# Patient Record
Sex: FEMALE | Race: WHITE | Employment: OTHER | URBAN - METROPOLITAN AREA
[De-identification: names, ages, dates, MRNs, and addresses within clinical notes are randomized per-mention and may not be internally consistent; named-entity substitution may affect disease eponyms.]

---

## 2017-12-28 PROBLEM — N17.9 ACUTE KIDNEY INJURY (HCC): Status: ACTIVE | Noted: 2017-12-28

## 2017-12-28 PROBLEM — N17.9 ACUTE RENAL FAILURE (ARF) (HCC): Status: ACTIVE | Noted: 2017-12-28

## 2018-03-30 ENCOUNTER — APPOINTMENT (OUTPATIENT)
Dept: GENERAL RADIOLOGY | Age: 78
End: 2018-03-30
Payer: MEDICARE

## 2018-03-30 ENCOUNTER — HOSPITAL ENCOUNTER (EMERGENCY)
Age: 78
Discharge: OTHER INSTITUTION | End: 2018-03-30
Attending: EMERGENCY MEDICINE
Payer: MEDICARE

## 2018-03-30 VITALS
DIASTOLIC BLOOD PRESSURE: 56 MMHG | OXYGEN SATURATION: 94 % | TEMPERATURE: 98 F | HEART RATE: 81 BPM | BODY MASS INDEX: 32.94 KG/M2 | WEIGHT: 179 LBS | RESPIRATION RATE: 18 BRPM | HEIGHT: 62 IN | SYSTOLIC BLOOD PRESSURE: 101 MMHG

## 2018-03-30 DIAGNOSIS — E87.70 HYPERVOLEMIA, UNSPECIFIED HYPERVOLEMIA TYPE: ICD-10-CM

## 2018-03-30 DIAGNOSIS — E87.5 HYPERKALEMIA: Primary | ICD-10-CM

## 2018-03-30 DIAGNOSIS — I50.9 CONGESTIVE HEART FAILURE, UNSPECIFIED CONGESTIVE HEART FAILURE CHRONICITY, UNSPECIFIED CONGESTIVE HEART FAILURE TYPE: ICD-10-CM

## 2018-03-30 DIAGNOSIS — N17.9 AKI (ACUTE KIDNEY INJURY) (HCC): ICD-10-CM

## 2018-03-30 DIAGNOSIS — D62 ACUTE BLOOD LOSS ANEMIA: ICD-10-CM

## 2018-03-30 LAB
ABO/RH: NORMAL
ALBUMIN SERPL-MCNC: 2.9 G/DL (ref 3.5–5.2)
ALP BLD-CCNC: 96 U/L (ref 35–104)
ALT SERPL-CCNC: 35 U/L (ref 0–32)
ANION GAP SERPL CALCULATED.3IONS-SCNC: 11 MMOL/L (ref 7–16)
ANTIBODY SCREEN: NORMAL
APTT: 23.5 SEC (ref 24.5–35.1)
AST SERPL-CCNC: 34 U/L (ref 0–31)
BASOPHILS ABSOLUTE: 0.07 E9/L (ref 0–0.2)
BASOPHILS RELATIVE PERCENT: 0.6 % (ref 0–2)
BILIRUB SERPL-MCNC: 0.4 MG/DL (ref 0–1.2)
BLOOD BANK DISPENSE STATUS: NORMAL
BLOOD BANK PRODUCT CODE: NORMAL
BPU ID: NORMAL
BUN BLDV-MCNC: 61 MG/DL (ref 8–23)
CALCIUM SERPL-MCNC: 8.7 MG/DL (ref 8.6–10.2)
CHLORIDE BLD-SCNC: 91 MMOL/L (ref 98–107)
CO2: 30 MMOL/L (ref 22–29)
CREAT SERPL-MCNC: 1.6 MG/DL (ref 0.5–1)
DESCRIPTION BLOOD BANK: NORMAL
EKG ATRIAL RATE: 79 BPM
EKG P AXIS: 29 DEGREES
EKG P-R INTERVAL: 180 MS
EKG Q-T INTERVAL: 332 MS
EKG QRS DURATION: 84 MS
EKG QTC CALCULATION (BAZETT): 380 MS
EKG R AXIS: 5 DEGREES
EKG T AXIS: 142 DEGREES
EKG VENTRICULAR RATE: 79 BPM
EOSINOPHILS ABSOLUTE: 1.06 E9/L (ref 0.05–0.5)
EOSINOPHILS RELATIVE PERCENT: 8.5 % (ref 0–6)
GFR AFRICAN AMERICAN: 38
GFR NON-AFRICAN AMERICAN: 31 ML/MIN/1.73
GLUCOSE BLD-MCNC: 128 MG/DL (ref 74–109)
HCT VFR BLD CALC: 24.5 % (ref 34–48)
HEMOGLOBIN: 7.6 G/DL (ref 11.5–15.5)
IMMATURE GRANULOCYTES #: 0.08 E9/L
IMMATURE GRANULOCYTES %: 0.6 % (ref 0–5)
INR BLD: 1.1
LYMPHOCYTES ABSOLUTE: 1.51 E9/L (ref 1.5–4)
LYMPHOCYTES RELATIVE PERCENT: 12.1 % (ref 20–42)
MAGNESIUM: 2.7 MG/DL (ref 1.6–2.6)
MCH RBC QN AUTO: 27 PG (ref 26–35)
MCHC RBC AUTO-ENTMCNC: 31 % (ref 32–34.5)
MCV RBC AUTO: 87.2 FL (ref 80–99.9)
MONOCYTES ABSOLUTE: 0.99 E9/L (ref 0.1–0.95)
MONOCYTES RELATIVE PERCENT: 7.9 % (ref 2–12)
NEUTROPHILS ABSOLUTE: 8.82 E9/L (ref 1.8–7.3)
NEUTROPHILS RELATIVE PERCENT: 70.3 % (ref 43–80)
PDW BLD-RTO: 16.4 FL (ref 11.5–15)
PLATELET # BLD: 291 E9/L (ref 130–450)
PMV BLD AUTO: 10.1 FL (ref 7–12)
POTASSIUM SERPL-SCNC: 5.8 MMOL/L (ref 3.5–5)
PRO-BNP: 1002 PG/ML (ref 0–450)
PROTHROMBIN TIME: 12 SEC (ref 9.3–12.4)
RBC # BLD: 2.81 E12/L (ref 3.5–5.5)
SODIUM BLD-SCNC: 132 MMOL/L (ref 132–146)
TOTAL PROTEIN: 5.6 G/DL (ref 6.4–8.3)
TROPONIN: 0.03 NG/ML (ref 0–0.03)
WBC # BLD: 12.5 E9/L (ref 4.5–11.5)

## 2018-03-30 PROCEDURE — 96375 TX/PRO/DX INJ NEW DRUG ADDON: CPT

## 2018-03-30 PROCEDURE — 83880 ASSAY OF NATRIURETIC PEPTIDE: CPT

## 2018-03-30 PROCEDURE — 36415 COLL VENOUS BLD VENIPUNCTURE: CPT

## 2018-03-30 PROCEDURE — 6370000000 HC RX 637 (ALT 250 FOR IP): Performed by: EMERGENCY MEDICINE

## 2018-03-30 PROCEDURE — 85610 PROTHROMBIN TIME: CPT

## 2018-03-30 PROCEDURE — 83735 ASSAY OF MAGNESIUM: CPT

## 2018-03-30 PROCEDURE — 99285 EMERGENCY DEPT VISIT HI MDM: CPT

## 2018-03-30 PROCEDURE — 80053 COMPREHEN METABOLIC PANEL: CPT

## 2018-03-30 PROCEDURE — 86920 COMPATIBILITY TEST SPIN: CPT

## 2018-03-30 PROCEDURE — 85025 COMPLETE CBC W/AUTO DIFF WBC: CPT

## 2018-03-30 PROCEDURE — 36430 TRANSFUSION BLD/BLD COMPNT: CPT

## 2018-03-30 PROCEDURE — 71045 X-RAY EXAM CHEST 1 VIEW: CPT

## 2018-03-30 PROCEDURE — 84484 ASSAY OF TROPONIN QUANT: CPT

## 2018-03-30 PROCEDURE — 86900 BLOOD TYPING SEROLOGIC ABO: CPT

## 2018-03-30 PROCEDURE — 96374 THER/PROPH/DIAG INJ IV PUSH: CPT

## 2018-03-30 PROCEDURE — 86901 BLOOD TYPING SEROLOGIC RH(D): CPT

## 2018-03-30 PROCEDURE — 93005 ELECTROCARDIOGRAM TRACING: CPT | Performed by: EMERGENCY MEDICINE

## 2018-03-30 PROCEDURE — 85730 THROMBOPLASTIN TIME PARTIAL: CPT

## 2018-03-30 PROCEDURE — 6370000000 HC RX 637 (ALT 250 FOR IP)

## 2018-03-30 PROCEDURE — P9016 RBC LEUKOCYTES REDUCED: HCPCS

## 2018-03-30 PROCEDURE — 6360000002 HC RX W HCPCS: Performed by: EMERGENCY MEDICINE

## 2018-03-30 PROCEDURE — 2580000003 HC RX 258: Performed by: EMERGENCY MEDICINE

## 2018-03-30 PROCEDURE — 86850 RBC ANTIBODY SCREEN: CPT

## 2018-03-30 RX ORDER — POTASSIUM CHLORIDE 20 MEQ/1
20 TABLET, EXTENDED RELEASE ORAL DAILY
Status: ON HOLD | COMMUNITY
End: 2020-02-18

## 2018-03-30 RX ORDER — NICOTINE POLACRILEX 4 MG
15 LOZENGE BUCCAL PRN
Status: DISCONTINUED | OUTPATIENT
Start: 2018-03-30 | End: 2018-03-31 | Stop reason: HOSPADM

## 2018-03-30 RX ORDER — ACETAMINOPHEN 500 MG
TABLET ORAL
Status: COMPLETED
Start: 2018-03-30 | End: 2018-03-30

## 2018-03-30 RX ORDER — 0.9 % SODIUM CHLORIDE 0.9 %
250 INTRAVENOUS SOLUTION INTRAVENOUS ONCE
Status: DISCONTINUED | OUTPATIENT
Start: 2018-03-30 | End: 2018-03-31 | Stop reason: HOSPADM

## 2018-03-30 RX ORDER — ACETAMINOPHEN 325 MG/1
650 TABLET ORAL EVERY 4 HOURS PRN
COMMUNITY

## 2018-03-30 RX ORDER — ACETAMINOPHEN 500 MG
500 TABLET ORAL ONCE
Status: COMPLETED | OUTPATIENT
Start: 2018-03-30 | End: 2018-03-30

## 2018-03-30 RX ORDER — CARVEDILOL 6.25 MG/1
6.25 TABLET ORAL NIGHTLY
COMMUNITY

## 2018-03-30 RX ORDER — ONDANSETRON 4 MG/1
4 TABLET, FILM COATED ORAL EVERY 8 HOURS PRN
Status: ON HOLD | COMMUNITY
End: 2020-02-18

## 2018-03-30 RX ORDER — FUROSEMIDE 10 MG/ML
40 INJECTION INTRAMUSCULAR; INTRAVENOUS ONCE
Status: COMPLETED | OUTPATIENT
Start: 2018-03-30 | End: 2018-03-30

## 2018-03-30 RX ORDER — DEXTROSE MONOHYDRATE 25 G/50ML
12.5 INJECTION, SOLUTION INTRAVENOUS PRN
Status: DISCONTINUED | OUTPATIENT
Start: 2018-03-30 | End: 2018-03-31 | Stop reason: HOSPADM

## 2018-03-30 RX ORDER — DEXTROSE MONOHYDRATE 50 MG/ML
100 INJECTION, SOLUTION INTRAVENOUS PRN
Status: DISCONTINUED | OUTPATIENT
Start: 2018-03-30 | End: 2018-03-31 | Stop reason: HOSPADM

## 2018-03-30 RX ORDER — LISINOPRIL 40 MG/1
20 TABLET ORAL DAILY
Status: ON HOLD | COMMUNITY
End: 2020-02-18

## 2018-03-30 RX ORDER — DEXTROSE MONOHYDRATE 25 G/50ML
25 INJECTION, SOLUTION INTRAVENOUS ONCE
Status: COMPLETED | OUTPATIENT
Start: 2018-03-30 | End: 2018-03-30

## 2018-03-30 RX ORDER — SODIUM POLYSTYRENE SULFONATE 15 G/60ML
15 SUSPENSION ORAL; RECTAL ONCE
Status: COMPLETED | OUTPATIENT
Start: 2018-03-30 | End: 2018-03-30

## 2018-03-30 RX ORDER — HYDROCODONE BITARTRATE AND ACETAMINOPHEN 5; 325 MG/1; MG/1
1 TABLET ORAL EVERY 6 HOURS PRN
Status: ON HOLD | COMMUNITY
End: 2020-02-18

## 2018-03-30 RX ADMIN — Medication 500 MG: at 22:29

## 2018-03-30 RX ADMIN — SODIUM POLYSTYRENE SULFONATE 15 G: 15 SUSPENSION ORAL; RECTAL at 20:08

## 2018-03-30 RX ADMIN — INSULIN HUMAN 10 UNITS: 100 INJECTION, SOLUTION PARENTERAL at 20:03

## 2018-03-30 RX ADMIN — ACETAMINOPHEN 500 MG: 500 TABLET, FILM COATED ORAL at 22:29

## 2018-03-30 RX ADMIN — FUROSEMIDE 40 MG: 10 INJECTION, SOLUTION INTRAMUSCULAR; INTRAVENOUS at 20:02

## 2018-03-30 RX ADMIN — DEXTROSE MONOHYDRATE 25 G: 25 INJECTION, SOLUTION INTRAVENOUS at 20:03

## 2018-03-30 ASSESSMENT — PAIN DESCRIPTION - LOCATION: LOCATION: CHEST

## 2018-03-30 ASSESSMENT — PAIN DESCRIPTION - PAIN TYPE: TYPE: ACUTE PAIN;CHRONIC PAIN

## 2018-03-30 ASSESSMENT — PAIN SCALES - GENERAL
PAINLEVEL_OUTOF10: 10
PAINLEVEL_OUTOF10: 5

## 2018-03-30 ASSESSMENT — PAIN DESCRIPTION - ORIENTATION: ORIENTATION: MID

## 2020-02-18 ENCOUNTER — APPOINTMENT (OUTPATIENT)
Dept: GENERAL RADIOLOGY | Age: 80
End: 2020-02-18
Payer: MEDICARE

## 2020-02-18 ENCOUNTER — HOSPITAL ENCOUNTER (OUTPATIENT)
Age: 80
Setting detail: OBSERVATION
Discharge: NURSING, REHAB OR LONG-TERM CARE | End: 2020-02-20
Attending: EMERGENCY MEDICINE | Admitting: FAMILY MEDICINE
Payer: MEDICARE

## 2020-02-18 ENCOUNTER — APPOINTMENT (OUTPATIENT)
Dept: CT IMAGING | Age: 80
End: 2020-02-18
Payer: MEDICARE

## 2020-02-18 PROBLEM — R26.2 UNABLE TO AMBULATE: Status: ACTIVE | Noted: 2020-02-18

## 2020-02-18 PROBLEM — N18.9 CKD (CHRONIC KIDNEY DISEASE): Status: ACTIVE | Noted: 2020-02-18

## 2020-02-18 PROBLEM — M51.36 DDD (DEGENERATIVE DISC DISEASE), LUMBAR: Status: ACTIVE | Noted: 2020-02-18

## 2020-02-18 PROBLEM — E88.09 HYPOALBUMINEMIA: Status: ACTIVE | Noted: 2020-02-18

## 2020-02-18 PROBLEM — R53.81 PHYSICAL DECONDITIONING: Status: ACTIVE | Noted: 2020-02-18

## 2020-02-18 PROBLEM — Z95.2 HISTORY OF AORTIC VALVE REPLACEMENT: Status: ACTIVE | Noted: 2020-02-18

## 2020-02-18 PROBLEM — S32.10XA CLOSED FRACTURE OF SACRUM, INITIAL ENCOUNTER (HCC): Status: ACTIVE | Noted: 2020-02-18

## 2020-02-18 LAB
ALBUMIN SERPL-MCNC: 2.9 G/DL (ref 3.5–5.2)
ALP BLD-CCNC: 149 U/L (ref 35–104)
ALT SERPL-CCNC: 11 U/L (ref 0–32)
ANION GAP SERPL CALCULATED.3IONS-SCNC: 10 MMOL/L (ref 7–16)
AST SERPL-CCNC: 17 U/L (ref 0–31)
BASOPHILS ABSOLUTE: 0.07 E9/L (ref 0–0.2)
BASOPHILS RELATIVE PERCENT: 0.7 % (ref 0–2)
BILIRUB SERPL-MCNC: 0.3 MG/DL (ref 0–1.2)
BUN BLDV-MCNC: 22 MG/DL (ref 8–23)
CALCIUM SERPL-MCNC: 8.9 MG/DL (ref 8.6–10.2)
CHLORIDE BLD-SCNC: 107 MMOL/L (ref 98–107)
CO2: 24 MMOL/L (ref 22–29)
CREAT SERPL-MCNC: 1.2 MG/DL (ref 0.5–1)
EOSINOPHILS ABSOLUTE: 0.63 E9/L (ref 0.05–0.5)
EOSINOPHILS RELATIVE PERCENT: 6.2 % (ref 0–6)
GFR AFRICAN AMERICAN: 52
GFR NON-AFRICAN AMERICAN: 43 ML/MIN/1.73
GLUCOSE BLD-MCNC: 102 MG/DL (ref 74–99)
HCT VFR BLD CALC: 36.6 % (ref 34–48)
HEMOGLOBIN: 11.1 G/DL (ref 11.5–15.5)
IMMATURE GRANULOCYTES #: 0.04 E9/L
IMMATURE GRANULOCYTES %: 0.4 % (ref 0–5)
LYMPHOCYTES ABSOLUTE: 1.18 E9/L (ref 1.5–4)
LYMPHOCYTES RELATIVE PERCENT: 11.5 % (ref 20–42)
MCH RBC QN AUTO: 25.8 PG (ref 26–35)
MCHC RBC AUTO-ENTMCNC: 30.3 % (ref 32–34.5)
MCV RBC AUTO: 85.1 FL (ref 80–99.9)
MONOCYTES ABSOLUTE: 0.71 E9/L (ref 0.1–0.95)
MONOCYTES RELATIVE PERCENT: 6.9 % (ref 2–12)
NEUTROPHILS ABSOLUTE: 7.59 E9/L (ref 1.8–7.3)
NEUTROPHILS RELATIVE PERCENT: 74.3 % (ref 43–80)
PDW BLD-RTO: 18.2 FL (ref 11.5–15)
PLATELET # BLD: 231 E9/L (ref 130–450)
PMV BLD AUTO: 10.4 FL (ref 7–12)
POTASSIUM SERPL-SCNC: 4 MMOL/L (ref 3.5–5)
RBC # BLD: 4.3 E12/L (ref 3.5–5.5)
REASON FOR REJECTION: NORMAL
REJECTED TEST: NORMAL
SODIUM BLD-SCNC: 141 MMOL/L (ref 132–146)
TOTAL PROTEIN: 5.4 G/DL (ref 6.4–8.3)
WBC # BLD: 10.2 E9/L (ref 4.5–11.5)

## 2020-02-18 PROCEDURE — 73560 X-RAY EXAM OF KNEE 1 OR 2: CPT

## 2020-02-18 PROCEDURE — 72131 CT LUMBAR SPINE W/O DYE: CPT

## 2020-02-18 PROCEDURE — 6370000000 HC RX 637 (ALT 250 FOR IP): Performed by: NURSE PRACTITIONER

## 2020-02-18 PROCEDURE — 2580000003 HC RX 258: Performed by: NURSE PRACTITIONER

## 2020-02-18 PROCEDURE — 73502 X-RAY EXAM HIP UNI 2-3 VIEWS: CPT

## 2020-02-18 PROCEDURE — 72190 X-RAY EXAM OF PELVIS: CPT

## 2020-02-18 PROCEDURE — 6360000002 HC RX W HCPCS: Performed by: EMERGENCY MEDICINE

## 2020-02-18 PROCEDURE — 96375 TX/PRO/DX INJ NEW DRUG ADDON: CPT

## 2020-02-18 PROCEDURE — 85025 COMPLETE CBC W/AUTO DIFF WBC: CPT

## 2020-02-18 PROCEDURE — 36415 COLL VENOUS BLD VENIPUNCTURE: CPT

## 2020-02-18 PROCEDURE — 80053 COMPREHEN METABOLIC PANEL: CPT

## 2020-02-18 PROCEDURE — 6370000000 HC RX 637 (ALT 250 FOR IP): Performed by: FAMILY MEDICINE

## 2020-02-18 PROCEDURE — 1200000000 HC SEMI PRIVATE

## 2020-02-18 PROCEDURE — 6360000002 HC RX W HCPCS: Performed by: NURSE PRACTITIONER

## 2020-02-18 PROCEDURE — 99285 EMERGENCY DEPT VISIT HI MDM: CPT

## 2020-02-18 PROCEDURE — 96376 TX/PRO/DX INJ SAME DRUG ADON: CPT

## 2020-02-18 PROCEDURE — 96374 THER/PROPH/DIAG INJ IV PUSH: CPT

## 2020-02-18 RX ORDER — CHOLECALCIFEROL (VITAMIN D3) 50 MCG
2000 TABLET ORAL DAILY
Status: DISCONTINUED | OUTPATIENT
Start: 2020-02-18 | End: 2020-02-20 | Stop reason: HOSPADM

## 2020-02-18 RX ORDER — ROSUVASTATIN CALCIUM 20 MG/1
20 TABLET, COATED ORAL DAILY
Status: DISCONTINUED | OUTPATIENT
Start: 2020-02-18 | End: 2020-02-20 | Stop reason: HOSPADM

## 2020-02-18 RX ORDER — MAGNESIUM 30 MG
250 TABLET ORAL NIGHTLY
COMMUNITY

## 2020-02-18 RX ORDER — DICLOFENAC SODIUM 75 MG/1
75 TABLET, DELAYED RELEASE ORAL 2 TIMES DAILY
Status: ON HOLD | COMMUNITY
End: 2020-03-11 | Stop reason: HOSPADM

## 2020-02-18 RX ORDER — SODIUM CHLORIDE 0.9 % (FLUSH) 0.9 %
10 SYRINGE (ML) INJECTION EVERY 12 HOURS SCHEDULED
Status: DISCONTINUED | OUTPATIENT
Start: 2020-02-18 | End: 2020-02-20 | Stop reason: HOSPADM

## 2020-02-18 RX ORDER — FUROSEMIDE 40 MG/1
40 TABLET ORAL DAILY
Status: DISCONTINUED | OUTPATIENT
Start: 2020-02-18 | End: 2020-02-20 | Stop reason: HOSPADM

## 2020-02-18 RX ORDER — HYDROCODONE BITARTRATE AND ACETAMINOPHEN 5; 325 MG/1; MG/1
1 TABLET ORAL EVERY 6 HOURS PRN
Status: DISCONTINUED | OUTPATIENT
Start: 2020-02-18 | End: 2020-02-20 | Stop reason: HOSPADM

## 2020-02-18 RX ORDER — FUROSEMIDE 20 MG/1
20 TABLET ORAL DAILY
Status: DISCONTINUED | OUTPATIENT
Start: 2020-02-18 | End: 2020-02-18

## 2020-02-18 RX ORDER — FERROUS SULFATE 325(65) MG
325 TABLET ORAL 2 TIMES DAILY
COMMUNITY

## 2020-02-18 RX ORDER — SODIUM CHLORIDE 0.9 % (FLUSH) 0.9 %
10 SYRINGE (ML) INJECTION PRN
Status: DISCONTINUED | OUTPATIENT
Start: 2020-02-18 | End: 2020-02-20 | Stop reason: HOSPADM

## 2020-02-18 RX ORDER — HEPARIN SODIUM 10000 [USP'U]/ML
5000 INJECTION, SOLUTION INTRAVENOUS; SUBCUTANEOUS EVERY 12 HOURS
Status: DISCONTINUED | OUTPATIENT
Start: 2020-02-18 | End: 2020-02-20 | Stop reason: HOSPADM

## 2020-02-18 RX ORDER — FERROUS SULFATE 325(65) MG
325 TABLET ORAL 2 TIMES DAILY
Status: DISCONTINUED | OUTPATIENT
Start: 2020-02-18 | End: 2020-02-20 | Stop reason: HOSPADM

## 2020-02-18 RX ORDER — CLOPIDOGREL BISULFATE 75 MG/1
75 TABLET ORAL DAILY
Status: DISCONTINUED | OUTPATIENT
Start: 2020-02-18 | End: 2020-02-20 | Stop reason: HOSPADM

## 2020-02-18 RX ORDER — MAGNESIUM GLUCONATE 27 MG(500)
250 TABLET ORAL NIGHTLY
Status: DISCONTINUED | OUTPATIENT
Start: 2020-02-18 | End: 2020-02-20 | Stop reason: HOSPADM

## 2020-02-18 RX ORDER — MORPHINE SULFATE 4 MG/ML
4 INJECTION, SOLUTION INTRAMUSCULAR; INTRAVENOUS ONCE
Status: COMPLETED | OUTPATIENT
Start: 2020-02-18 | End: 2020-02-18

## 2020-02-18 RX ORDER — FENTANYL CITRATE 50 UG/ML
25 INJECTION, SOLUTION INTRAMUSCULAR; INTRAVENOUS ONCE
Status: COMPLETED | OUTPATIENT
Start: 2020-02-18 | End: 2020-02-18

## 2020-02-18 RX ORDER — ASPIRIN 81 MG/1
81 TABLET, CHEWABLE ORAL DAILY
Status: DISCONTINUED | OUTPATIENT
Start: 2020-02-18 | End: 2020-02-20 | Stop reason: HOSPADM

## 2020-02-18 RX ORDER — CARVEDILOL 6.25 MG/1
6.25 TABLET ORAL NIGHTLY
Status: DISCONTINUED | OUTPATIENT
Start: 2020-02-18 | End: 2020-02-20 | Stop reason: HOSPADM

## 2020-02-18 RX ORDER — MORPHINE SULFATE 2 MG/ML
2 INJECTION, SOLUTION INTRAMUSCULAR; INTRAVENOUS EVERY 4 HOURS PRN
Status: DISCONTINUED | OUTPATIENT
Start: 2020-02-18 | End: 2020-02-18

## 2020-02-18 RX ORDER — CARVEDILOL 6.25 MG/1
12.5 TABLET ORAL EVERY MORNING
Status: DISCONTINUED | OUTPATIENT
Start: 2020-02-19 | End: 2020-02-20 | Stop reason: HOSPADM

## 2020-02-18 RX ADMIN — FENTANYL CITRATE 25 MCG: 50 INJECTION, SOLUTION INTRAMUSCULAR; INTRAVENOUS at 13:12

## 2020-02-18 RX ADMIN — HYDROCODONE BITARTRATE AND ACETAMINOPHEN 1 TABLET: 5; 325 TABLET ORAL at 23:34

## 2020-02-18 RX ADMIN — FERROUS SULFATE TAB 325 MG (65 MG ELEMENTAL FE) 325 MG: 325 (65 FE) TAB at 17:10

## 2020-02-18 RX ADMIN — CARVEDILOL 6.25 MG: 6.25 TABLET, FILM COATED ORAL at 19:16

## 2020-02-18 RX ADMIN — MORPHINE SULFATE 4 MG: 4 INJECTION, SOLUTION INTRAMUSCULAR; INTRAVENOUS at 14:11

## 2020-02-18 RX ADMIN — HYDROCODONE BITARTRATE AND ACETAMINOPHEN 1 TABLET: 5; 325 TABLET ORAL at 17:10

## 2020-02-18 RX ADMIN — ASPIRIN 81 MG 81 MG: 81 TABLET ORAL at 17:10

## 2020-02-18 RX ADMIN — SODIUM CHLORIDE, PRESERVATIVE FREE 10 ML: 5 INJECTION INTRAVENOUS at 21:01

## 2020-02-18 RX ADMIN — ROSUVASTATIN CALCIUM 20 MG: 20 TABLET, FILM COATED ORAL at 21:00

## 2020-02-18 RX ADMIN — Medication 2000 UNITS: at 19:16

## 2020-02-18 RX ADMIN — Medication 250 MG: at 21:00

## 2020-02-18 RX ADMIN — CLOPIDOGREL 75 MG: 75 TABLET, FILM COATED ORAL at 17:10

## 2020-02-18 RX ADMIN — HEPARIN SODIUM 5000 UNITS: 10000 INJECTION, SOLUTION INTRAVENOUS; SUBCUTANEOUS at 17:18

## 2020-02-18 ASSESSMENT — PAIN SCALES - GENERAL
PAINLEVEL_OUTOF10: 10
PAINLEVEL_OUTOF10: 10
PAINLEVEL_OUTOF10: 3
PAINLEVEL_OUTOF10: 5
PAINLEVEL_OUTOF10: 7
PAINLEVEL_OUTOF10: 10
PAINLEVEL_OUTOF10: 3
PAINLEVEL_OUTOF10: 10
PAINLEVEL_OUTOF10: 10

## 2020-02-18 ASSESSMENT — PAIN DESCRIPTION - ONSET
ONSET: ON-GOING
ONSET: ON-GOING

## 2020-02-18 ASSESSMENT — PAIN DESCRIPTION - PAIN TYPE
TYPE: ACUTE PAIN
TYPE: ACUTE PAIN

## 2020-02-18 ASSESSMENT — PAIN DESCRIPTION - DESCRIPTORS
DESCRIPTORS: CONSTANT;SHARP;SHOOTING
DESCRIPTORS: CONSTANT;SHARP;SHOOTING

## 2020-02-18 ASSESSMENT — PAIN DESCRIPTION - ORIENTATION
ORIENTATION: RIGHT
ORIENTATION: RIGHT

## 2020-02-18 ASSESSMENT — PAIN DESCRIPTION - PROGRESSION
CLINICAL_PROGRESSION: NOT CHANGED
CLINICAL_PROGRESSION: NOT CHANGED

## 2020-02-18 ASSESSMENT — PAIN DESCRIPTION - LOCATION
LOCATION: HIP;PELVIS
LOCATION: HIP;PELVIS

## 2020-02-18 ASSESSMENT — PAIN DESCRIPTION - FREQUENCY
FREQUENCY: CONTINUOUS
FREQUENCY: CONTINUOUS

## 2020-02-18 NOTE — H&P
Hospital Medicine History & Physical      PCP: Luis Servin DO    Date of Admission: 2/18/2020    Date of Service: Pt seen/examined on 2/18/2020 and Admitted to Inpatient with expected LOS greater than two midnights due to medical therapy. Chief Complaint: Back pain    History Of Present Illness:  Records reviewed. This is a  [de-identified] y.o. female with a PMH of HTN, OA, anemia, CABG x2, AVR, CKD, old thoracic and lumbar compression fx, and chronic back pain who presented to [address] [place] [address] with back pain. She has had chronic back pain for 10 years for which she has a Morphine pain pump. However, the past few days the pain has been worse, she has only been out of her recliner to use the restroom at which time she has to \"shuffle\" because she cannot walk normally. This morning when she woke up she was in so much pain she could not get up because she was afraid she would not be able to walk and she would fall. She states the pain is in the back/ top of her right hip/lower back and sometimes radiates through her hip to the front. The pain has been constant and suddenly worsened this morning since onset. It is sharp and stabbing in nature. Sitting up and movement worsens the pain and lying still on her back relieves it. She denies any injury, fall, or trauma. She denies any difficulty with her bowel or bladder, weakness, paresthesias, or swelling. Since arrival Cr.1.2 which is apparently baseline, Albumin 2.9, Alk phos 149, Hb 11.1. CT of the lumbar spine revealed acute and subacute right sacral fractures, a subacute right L5 transverse process facture, and a chronic fracture of the right L5 pars and L2 compression. XR of the right hip showed possible nondisplaced fractures of the right anterior pelvic girdle. Fentanyl and morphine given for pain control.      Past Medical History:          Diagnosis Date    Anemia 11/22/2012    CAD (coronary artery disease)     Chronic back pain     CKD Penicillins    Social History:      The patient currently lives at home alone    TOBACCO:   reports that she has never smoked. She has never used smokeless tobacco.  ETOH:   reports no history of alcohol use. Family History:      History reviewed. No pertinent family history. REVIEW OF SYSTEMS:   Pertinent positives as noted in the HPI. All other systems reviewed and negative. PHYSICAL EXAM:    BP (!) 173/78   Pulse 68   Temp 97.9 °F (36.6 °C)   Resp 18   Ht 5' 2\" (1.575 m)   Wt 180 lb (81.6 kg)   SpO2 96%   BMI 32.92 kg/m²     General appearance:  No apparent distress, appears stated age and cooperative. HEENT:  Normal cephalic, atraumatic without obvious deformity. Pupils equal, round, and reactive to light. Extra ocular muscles intact. Conjunctivae/corneas clear. Neck: Supple, with full range of motion. No jugular venous distention. Trachea midline. Respiratory:  Normal respiratory effort. Clear to auscultation, bilaterally without Rales/Wheezes/Rhonchi. Cardiovascular:  Regular rate and rhythm with normal S1/S2 without murmurs, rubs or gallops. Abdomen: Soft, non-tender, non-distended with normal bowel sounds. Musculoskeletal:  No clubbing, cyanosis or edema bilaterally. Full range of motion without deformity. Skin: Skin color, texture, turgor normal.  No rashes or lesions. Neurologic:  Neurovascularly intact without any focal sensory/motor deficits. Psychiatric:  Alert and oriented, thought content appropriate, normal insight  Capillary Refill: Brisk,< 3 seconds   Peripheral Pulses: +2 palpable, equal bilaterally     Xr Hip Right (2-3 Views)  Result Date: 2/18/2020  Possible nondisplaced fractures of the anterior pelvic girdle on the right, and more detailed characterization with additional radiographs or noncontrast CT hip and pelvis imaging is suggested for assessment. ALERT:  THIS IS AN ABNORMAL REPORT    Ct Lumbar Spine Wo Contrast  Result Date: 2/18/2020  1.  Fractures of the right side of the sacrum appear to be of mixed age, some show healing. Acute and subacute right sacral fractures. 2. Subacute fracture right L5 transverse process. 3. Chronic partly healed right L5 pars fracture. 4. Chronic stable L2 compression fracture. 5. Chronic diffuse disc degeneration with increasing rotatory scoliosis and increasing left lateral subluxation L2 on L3. 6. A tiny left renal calculus. 7. Posterior spinal stimulator leads. Labs:     Recent Labs     02/18/20  1254   WBC 10.2   HGB 11.1*   HCT 36.6        Recent Labs     02/18/20  1404      K 4.0      CO2 24   BUN 22   CREATININE 1.2*   CALCIUM 8.9     Recent Labs     02/18/20  1404   AST 17   ALT 11   BILITOT 0.3   ALKPHOS 149*     No results for input(s): INR in the last 72 hours. No results for input(s): [place] in the last 72 hours.     Urinalysis:      Lab Results   Component Value Date    NITRU Negative 12/28/2017    WBCUA NONE 12/28/2017    BACTERIA RARE 12/28/2017    RBCUA 0-1 12/28/2017    RBCUA NONE 11/21/2012    BLOODU TRACE 12/28/2017    SPECGRAV <=1.005 12/28/2017    GLUCOSEU Negative 12/28/2017     ASSESSMENT:    Active Hospital Problems    Diagnosis Date Noted    Closed fracture of sacrum, initial encounter ([facility] [S32.10XA] 02/18/2020     Priority: High    DDD (degenerative disc disease), lumbar [M51.36] 02/18/2020    Hypoalbuminemia [E88.09] 02/18/2020    Unable to ambulate [R26.2] 02/18/2020    Physical deconditioning [R53.81] 02/18/2020    CKD (chronic kidney disease) [N18.9] 02/18/2020    History of aortic valve replacement [Z95.2] 02/18/2020    Compression fx, lumbar spine ([facility] [S32.000A] 11/22/2012    Compression fx, thoracic spine ([facility] [S22.000A] 11/22/2012    HTN (hypertension) [I10] 11/22/2012    OA (osteoarthritis) [M19.90] 11/22/2012    Osteoporosis [M81.0] 11/22/2012    Chronic pain [G89.29] 11/22/2012     PLAN:    Resume home meds  Norco PRN for pain  Consult ortho  PT/OT

## 2020-02-18 NOTE — PROGRESS NOTES
Message sent to Dr. Sonia Pack from orthopedic surgery to notify of new consult. Awaiting response to see who the surgeon will be.    Electronically signed by Bob Mckinley RN on 2/18/2020 at 4:46 PM

## 2020-02-18 NOTE — ED NOTES
Bed:  HA  Expected date:   Expected time:   Means of arrival:   Comments:  EMS     Tristan Luis, NHI  02/18/20 5799

## 2020-02-18 NOTE — PROGRESS NOTES
Edwin called and notified and notified of stat xay orders.   Electronically signed by Candis Robledo RN on 2/18/2020 at 5:23 PM

## 2020-02-18 NOTE — CONSULTS
Department of Orthopedic Surgery  Resident Consult Note          CHIEF COMPLAINT: Sufficiency fractures    HISTORY OF PRESENT ILLNESS:                The patient is a [de-identified] y.o. female who presents with insufficiency fractures that were found on imaging. Patient does not report any recent falls or traumas. States she did bump her right side of her pelvis against the counter roughly a month ago. States she has been having more difficulty ambulating and pain in the groin on the right side over the last few days. She does have a son that bumped into her right side that. Denies any other orthopedic complaints at this time.     Past Medical History:        Diagnosis Date    Anemia 11/22/2012    CAD (coronary artery disease)     Chronic back pain     CKD (chronic kidney disease)     Compression fracture of lumbar spine, non-traumatic (HCC)     Compression fracture of thoracic vertebra (HCC)     HTN (hypertension) 11/22/2012    OA (osteoarthritis) 11/22/2012    Osteoporosis 11/22/2012    S/P AVR     TISSUE    Valvular heart disease 11/22/2012     Past Surgical History:        Procedure Laterality Date    CORONARY ARTERY BYPASS GRAFT      ECHO COMPL W DOP COLOR FLOW  11/23/2012         PAIN MANAGEMENT PROCEDURE      MORPHINE PAIN PUMP     Current Medications:   Current Facility-Administered Medications: HYDROcodone-acetaminophen (NORCO) 5-325 MG per tablet 1 tablet, 1 tablet, Oral, Q6H PRN  aspirin chewable tablet 81 mg, 81 mg, Oral, Daily  [START ON 2/19/2020] carvedilol (COREG) tablet 12.5 mg, 12.5 mg, Oral, QAM  carvedilol (COREG) tablet 6.25 mg, 6.25 mg, Oral, Nightly  vitamin D tablet 2,000 Units, 2,000 Units, Oral, Daily  clopidogrel (PLAVIX) tablet 75 mg, 75 mg, Oral, Daily  ferrous sulfate tablet 325 mg, 325 mg, Oral, BID  magnesium gluconate (MAGONATE) tablet 250 mg, 250 mg, Oral, Nightly  rosuvastatin (CRESTOR) tablet 20 mg, 20 mg, Oral, Daily  sodium chloride flush 0.9 % injection 10 mL, 10 mL,

## 2020-02-18 NOTE — PROGRESS NOTES
Message sent to Dr. Carmen Bautista for admission orders.    Electronically signed by Melanie Rogers RN on 2/18/2020 at 4:28 PM

## 2020-02-18 NOTE — ED PROVIDER NOTES
Department of Emergency Medicine   ED  Provider Note  Admit Date/RoomTime: 2/18/2020  9:36 AM  ED Room: [facility]          History of Present Illness:  2/18/20, Time: 1:33 PM  Chief Complaint   Patient presents with    Back Pain     chronic back pains states worsening this morning making her unable to walk. states pains right hip/ back radiating to the groin and front of her leg. denying falls. states she has a morphine pain pump                Nu Parker is a [de-identified] y.o. female presenting to the ED for back pain. Patient says she is at low back pain and right hip pain chronically. Says got worse for the past couple of days. She says can no longer walk. She called EMS. Denies fall, injury or, or trauma. Denies any change in bowel bladder, denies saddle paresthesias, denies urinary retention. Denies any weakness in the extremities. Says it is a strictly pain issue. Denies any other symptoms or complaints. Review of Systems:   Pertinent positives and negatives are stated within HPI, all other systems reviewed and are negative.        --------------------------------------------- PAST HISTORY ---------------------------------------------  Past Medical History:  has a past medical history of Anemia, HTN (hypertension), OA (osteoarthritis), Osteoporosis, and Valvular heart disease. Past Surgical History:  has a past surgical history that includes ECHO Compl W Dop Color Flow (11/23/2012). Social History:  reports that she has never smoked. She has never used smokeless tobacco. She reports that she does not drink alcohol or use drugs. Family History: family history is not on file. . Unless otherwise noted, family history is non contributory    The patients home medications have been reviewed.     Allergies: Lodine [etodolac] and Penicillins        ---------------------------------------------------PHYSICAL EXAM--------------------------------------    Constitutional/General: Alert and oriented x3  Head: 0. 71 0.10 - 0.95 E9/L    Eosinophils Absolute 0.63 (H) 0.05 - 0.50 E9/L    Basophils Absolute 0.07 0.00 - 0.20 E9/L   ,       RADIOLOGY:  Interpreted by Radiologist unless otherwise specified  XR HIP RIGHT (2-3 VIEWS)   Final Result   Possible nondisplaced fractures of the anterior pelvic girdle on the   right, and more detailed characterization with additional radiographs   or noncontrast CT hip and pelvis imaging is suggested for assessment. ALERT:  THIS IS AN ABNORMAL REPORT      CT Lumbar Spine WO Contrast   Final Result      1. Fractures of the right side of the sacrum appear to be of mixed   age, some show healing. Acute and subacute right sacral fractures. 2. Subacute fracture right L5 transverse process. 3. Chronic partly healed right L5 pars fracture. 4. Chronic stable L2 compression fracture. 5. Chronic diffuse disc degeneration with increasing rotatory   scoliosis and increasing left lateral subluxation L2 on L3.   6. A tiny left renal calculus. 7. Posterior spinal stimulator leads. EKG Interpretation  Interpreted by emergency department physician, Dr. Julee Wright             ------------------------- NURSING NOTES AND VITALS REVIEWED ---------------------------   The nursing notes within the ED encounter and vital signs as below have been reviewed by myself  BP (!) 170/86   Pulse 68   Temp 97.9 °F (36.6 °C)   Resp 16   Ht 5' 2\" (1.575 m)   Wt 180 lb (81.6 kg)   SpO2 92%   BMI 32.92 kg/m²     Oxygen Saturation Interpretation: Normal    The patients available past medical records and past encounters were reviewed. ------------------------------ ED COURSE/MEDICAL DECISION MAKING----------------------  Medications   fentaNYL (SUBLIMAZE) injection 25 mcg (25 mcg Intravenous Given 2/18/20 1312)             Medical Decision Making:    Imaging reviewed. Reevaluation, patient requesting pain medication. Did receive fentanyl.   Given her multiple fractures, patient be admitted. Counseling: The emergency provider has spoken with the patient and discussed todays results, in addition to providing specific details for the plan of care and counseling regarding the diagnosis and prognosis. Questions are answered at this time and they are agreeable with the plan.       --------------------------------- IMPRESSION AND DISPOSITION ---------------------------------    IMPRESSION  1. Closed fracture of transverse process of lumbar vertebra, initial encounter ([facility]    2. Closed fracture of sacrum, unspecified portion of sacrum, [facility]        DISPOSITION  Disposition: Admit to med/surg floor  Patient condition is stable        NOTE: This report was transcribed using voice recognition software.  Every effort was made to ensure accuracy; however, inadvertent computerized transcription errors may be present        Lisandra Sky MD  02/18/20 2438

## 2020-02-19 LAB
ALBUMIN SERPL-MCNC: 2.6 G/DL (ref 3.5–5.2)
ALP BLD-CCNC: 130 U/L (ref 35–104)
ALT SERPL-CCNC: 8 U/L (ref 0–32)
ANION GAP SERPL CALCULATED.3IONS-SCNC: 11 MMOL/L (ref 7–16)
AST SERPL-CCNC: 14 U/L (ref 0–31)
BILIRUB SERPL-MCNC: 0.3 MG/DL (ref 0–1.2)
BUN BLDV-MCNC: 22 MG/DL (ref 8–23)
CALCIUM SERPL-MCNC: 8.6 MG/DL (ref 8.6–10.2)
CHLORIDE BLD-SCNC: 104 MMOL/L (ref 98–107)
CO2: 25 MMOL/L (ref 22–29)
CREAT SERPL-MCNC: 1.1 MG/DL (ref 0.5–1)
GFR AFRICAN AMERICAN: 58
GFR NON-AFRICAN AMERICAN: 48 ML/MIN/1.73
GLUCOSE BLD-MCNC: 102 MG/DL (ref 74–99)
HCT VFR BLD CALC: 34 % (ref 34–48)
HEMOGLOBIN: 10.3 G/DL (ref 11.5–15.5)
MCH RBC QN AUTO: 26.2 PG (ref 26–35)
MCHC RBC AUTO-ENTMCNC: 30.3 % (ref 32–34.5)
MCV RBC AUTO: 86.5 FL (ref 80–99.9)
PDW BLD-RTO: 17.7 FL (ref 11.5–15)
PLATELET # BLD: 224 E9/L (ref 130–450)
PMV BLD AUTO: 10.6 FL (ref 7–12)
POTASSIUM REFLEX MAGNESIUM: 3.9 MMOL/L (ref 3.5–5)
POTASSIUM SERPL-SCNC: 3.9 MMOL/L (ref 3.5–5)
RBC # BLD: 3.93 E12/L (ref 3.5–5.5)
SODIUM BLD-SCNC: 140 MMOL/L (ref 132–146)
TOTAL PROTEIN: 4.8 G/DL (ref 6.4–8.3)
WBC # BLD: 7.2 E9/L (ref 4.5–11.5)

## 2020-02-19 PROCEDURE — 97530 THERAPEUTIC ACTIVITIES: CPT

## 2020-02-19 PROCEDURE — 80048 BASIC METABOLIC PNL TOTAL CA: CPT

## 2020-02-19 PROCEDURE — 36415 COLL VENOUS BLD VENIPUNCTURE: CPT

## 2020-02-19 PROCEDURE — 6370000000 HC RX 637 (ALT 250 FOR IP): Performed by: NURSE PRACTITIONER

## 2020-02-19 PROCEDURE — 2580000003 HC RX 258: Performed by: NURSE PRACTITIONER

## 2020-02-19 PROCEDURE — 96372 THER/PROPH/DIAG INJ SC/IM: CPT

## 2020-02-19 PROCEDURE — 97166 OT EVAL MOD COMPLEX 45 MIN: CPT

## 2020-02-19 PROCEDURE — 99222 1ST HOSP IP/OBS MODERATE 55: CPT | Performed by: NURSE PRACTITIONER

## 2020-02-19 PROCEDURE — 6360000002 HC RX W HCPCS: Performed by: NURSE PRACTITIONER

## 2020-02-19 PROCEDURE — G0378 HOSPITAL OBSERVATION PER HR: HCPCS

## 2020-02-19 PROCEDURE — 6370000000 HC RX 637 (ALT 250 FOR IP): Performed by: FAMILY MEDICINE

## 2020-02-19 PROCEDURE — 85027 COMPLETE CBC AUTOMATED: CPT

## 2020-02-19 PROCEDURE — 97162 PT EVAL MOD COMPLEX 30 MIN: CPT

## 2020-02-19 PROCEDURE — 80053 COMPREHEN METABOLIC PANEL: CPT

## 2020-02-19 RX ORDER — PETROLATUM 42 G/100G
OINTMENT TOPICAL DAILY
Status: DISCONTINUED | OUTPATIENT
Start: 2020-02-19 | End: 2020-02-20 | Stop reason: HOSPADM

## 2020-02-19 RX ADMIN — CARVEDILOL 12.5 MG: 6.25 TABLET, FILM COATED ORAL at 09:24

## 2020-02-19 RX ADMIN — HEPARIN SODIUM 5000 UNITS: 10000 INJECTION, SOLUTION INTRAVENOUS; SUBCUTANEOUS at 18:25

## 2020-02-19 RX ADMIN — SODIUM CHLORIDE, PRESERVATIVE FREE 10 ML: 5 INJECTION INTRAVENOUS at 19:58

## 2020-02-19 RX ADMIN — ROSUVASTATIN CALCIUM 20 MG: 20 TABLET, FILM COATED ORAL at 19:57

## 2020-02-19 RX ADMIN — HYDROCODONE BITARTRATE AND ACETAMINOPHEN 1 TABLET: 5; 325 TABLET ORAL at 11:31

## 2020-02-19 RX ADMIN — HYDROCODONE BITARTRATE AND ACETAMINOPHEN 1 TABLET: 5; 325 TABLET ORAL at 18:25

## 2020-02-19 RX ADMIN — Medication 250 MG: at 19:57

## 2020-02-19 RX ADMIN — FERROUS SULFATE TAB 325 MG (65 MG ELEMENTAL FE) 325 MG: 325 (65 FE) TAB at 09:25

## 2020-02-19 RX ADMIN — ASPIRIN 81 MG 81 MG: 81 TABLET ORAL at 09:25

## 2020-02-19 RX ADMIN — Medication 2000 UNITS: at 09:24

## 2020-02-19 RX ADMIN — CLOPIDOGREL 75 MG: 75 TABLET, FILM COATED ORAL at 09:25

## 2020-02-19 RX ADMIN — HYDROCODONE BITARTRATE AND ACETAMINOPHEN 1 TABLET: 5; 325 TABLET ORAL at 05:30

## 2020-02-19 RX ADMIN — HEPARIN SODIUM 5000 UNITS: 10000 INJECTION, SOLUTION INTRAVENOUS; SUBCUTANEOUS at 05:28

## 2020-02-19 RX ADMIN — SODIUM CHLORIDE, PRESERVATIVE FREE 10 ML: 5 INJECTION INTRAVENOUS at 09:22

## 2020-02-19 RX ADMIN — FERROUS SULFATE TAB 325 MG (65 MG ELEMENTAL FE) 325 MG: 325 (65 FE) TAB at 18:25

## 2020-02-19 ASSESSMENT — PAIN DESCRIPTION - PAIN TYPE
TYPE: ACUTE PAIN
TYPE: ACUTE PAIN

## 2020-02-19 ASSESSMENT — PAIN DESCRIPTION - FREQUENCY: FREQUENCY: CONTINUOUS

## 2020-02-19 ASSESSMENT — PAIN SCALES - GENERAL
PAINLEVEL_OUTOF10: 7
PAINLEVEL_OUTOF10: 8
PAINLEVEL_OUTOF10: 0
PAINLEVEL_OUTOF10: 2
PAINLEVEL_OUTOF10: 7
PAINLEVEL_OUTOF10: 3
PAINLEVEL_OUTOF10: 3

## 2020-02-19 ASSESSMENT — PAIN DESCRIPTION - LOCATION
LOCATION: HIP
LOCATION: HIP

## 2020-02-19 ASSESSMENT — PAIN DESCRIPTION - ORIENTATION
ORIENTATION: RIGHT
ORIENTATION: RIGHT

## 2020-02-19 ASSESSMENT — PAIN DESCRIPTION - ONSET: ONSET: GRADUAL

## 2020-02-19 ASSESSMENT — PAIN DESCRIPTION - DESCRIPTORS: DESCRIPTORS: ACHING;DISCOMFORT;SHOOTING

## 2020-02-19 NOTE — PROGRESS NOTES
Physical Therapy  Physical Therapy Initial Assessment     Name: Nu Parker  : 1940  MRN: [id number]    Referring Provider:  ERIC Arvizu CNP    Date of Service: 2020    Evaluating PT:  Heather Villarreal PT, DPT PT [id number]    Room #:  8372/5470-I  Diagnosis:  Superior and Inferior Pubic Rami on the Right as well as Right Sacral Alar Fracture  PMHx/PSHx:  Anemia, HTN, OA, Valvular Heart Disease, CKD, CAD, Chronic Back Pain  Subacute Fx R L5 Transverse Process  Chronic Partly Healed R L5 Pars Fx  Chronic Stable L2 Compression Fx      Procedure/Surgery:  None  Precautions:  WBAT RLE, Falls, Spinal Neutral, O2  Equipment Needs:  TBD    SUBJECTIVE:    Pt lives alone in a 1 story home with 3 stairs to enter and single rail. Bed is on first floor and bath is on first floor. Pt ambulated with rollator independently PTA. Has not driven ~ 1 month. Equipment Owned: [employer]; Rollator,     OBJECTIVE:   Initial Evaluation  Date: 2020 Treatment Short Term/ Long Term   Goals   AM-PAC 6 Clicks      Was pt agreeable to Eval/treatment? Yes     Does pt have pain?  7/10 pain R Hip with mobility     Bed Mobility  Rolling: MaxA  Supine to sit: MaxA x 2  Sit to supine: NT  Scooting: MaxA  Rolling: SBA  Supine to sit: Melany  Sit to supine: Melany  Scooting: Melany   Transfers Sit to stand: ModA x 2  Stand to sit: ModA x 2  Stand pivot: MaxA x 2 [employer]  Sit to stand: Melany  Stand to sit: Melany  Stand pivot: Melany   Ambulation    3 feet with MaxA x 2 [employer]  >5 feet with ModA [employer]   Stair negotiation: ascended and descended  NT  >4 steps with single rail ModA   ROM BUE:  See OT Note  BLE:  WFL     Strength BUE:  See OT Note  RLE: 2+/5 hip and knee  3/5 ankle  LLE: 3-/5 hip and knee  3/5 ankle  Improve Strength 1/3 MMT Grade   Balance Sitting EOB:  Melany  Dynamic Standing:  MaxA x 2 [employer]  Sitting EOB:  SBA  Dynamic Standing:  Melany [employer]     Pt is A & O x 4  Sensation:  Pt denies numbness and tingling to extremities  Edema:  None    Vitals:  HR 80  Spo2 94%  PRE  HR 80  Spo2 94% 2 L O2 /66 Seated POST    Therapeutic Exercises:     AAROM LAQ BLEs 1x 10  Glute set 1x 10  Ankle pumps 1x 15    Patient education  Pt educated on role of PT    Patient response to education:   Pt verbalized understanding Pt demonstrated skill Pt requires further education in this area   x x x     ASSESSMENT:    Comments:  Pt received in supine agreeable to PT evaluation. Pt educated on WBAT and spinal neutral mechanics. Pt requiring assistance with trunk and BLEs for bed mobility. Pt sitting statically with intermittent steadying assistance. Pt requiring assistance for functional transfers due to strength deficits. Pt ambulating with decreased speed, step length, and step height bilaterally. Pt requiring assistance to advance RLE and LLE due to pain. Pt required assistance for eccentric control. Cues to avoid valsalva. Patient would benefit from continued skilled PT to maximize functional mobility independence. Treatment:  Patient practiced and was instructed in the following treatment:     Bed mobility- verbal cues to perform via log roll technique   Functional transfers- Verbal cues for proper positioning and sequencing to perform transfers safely with maximum independence.  Gait training- Verbal cues for proper positioning and sequencing using assistive device to maximize functional mobility independence. Pt's/ family goals   1. Get better    Patient and or family understand(s) diagnosis, prognosis, and plan of care. yes    PLAN:    PT care will be provided in accordance with the objectives noted above. Exercises and functional mobility practice will be used as well as appropriate assistive devices or modalities to obtain goals. Patient and family education will also be administered as needed. Frequency of treatments: 5-7x/week x 1-2 weeks.     Time in  1130  Time out  1145    Total Treatment Time  10 minutes     Evaluation Time includes thorough review of current medical information, gathering information on past medical history/social history and prior level of function, completion of standardized testing/informal observation of tasks, assessment of data and education on plan of care and goals.     CPT codes:  [] Low Complexity PT evaluation 84856  [x] Moderate Complexity PT evaluation 61862  [] High Complexity PT evaluation 13561  [] PT Re-evaluation 52535  [] Gait training 82579 0 minutes  [] Manual therapy 01563 0 minutes  [x] Therapeutic activities 61651 10 minutes  [] Therapeutic exercises 24813 0 minutes  [] Neuromuscular reeducation 53421 0 minutes       Christy Baer PT, DPT   [id number]

## 2020-02-19 NOTE — FLOWSHEET NOTE
Patient stated that she has POA and Living Will. She acknowledged that she needs to bring them in for her chart. Stated that her children know her health care wishes. 02/19/20 1027   Encounter Summary   Services provided to: Patient   Referral/Consult From: [address] [place] [address] Road Visiting   ([id number])   Complexity of Encounter Moderate   Spiritual Assessment Completed Yes   Advance Care Planning Yes   Routine   Type Initial   Spiritual/Mandaeism   Type Spiritual support   Assessment Calm; Approachable; Anxious   Intervention Active listening;Explored feelings, thoughts, concerns;Nurtured hope;Prayer;Provided reading materials/devotional materials; Discussed death;Discussed relationship with God;Discussed belief system/Hindu practices/earline;Discussed illness/injury and it's impact   Outcome Comfort;Expressed gratitude;Engaged in conversation;Expressed feelings/needs/concerns; Tearful;Less anxious, less agitated;Encouraged;Receptive   Advance Directives (For Healthcare)   Healthcare Directive Yes, patient has an advance directive for healthcare treatment   Type of Healthcare Directive Durable power of  for health care;Living will   Copy in Chart No, copy requested from other (See comment)  (Patient)   Healthcare Agent Appointed Adult [facility] [place] [address] Agent's Name Azul Tavera

## 2020-02-19 NOTE — PLAN OF CARE
Problem: Risk for Impaired Skin Integrity  Goal: Tissue integrity - skin and mucous membranes  Description  Structural intactness and normal physiological function of skin and  mucous membranes.   Outcome: Met This Shift     Problem: Falls - Risk of:  Goal: Will remain free from falls  Description  Will remain free from falls  Outcome: Met This Shift  Goal: Absence of physical injury  Description  Absence of physical injury  Outcome: Met This Shift     Problem: Pain:  Goal: Pain level will decrease  Description  Pain level will decrease  Outcome: Met This Shift  Goal: Control of acute pain  Description  Control of acute pain  Outcome: Met This Shift

## 2020-02-19 NOTE — CARE COORDINATION
2/19, SW spoke with patient at bedside. Discussed discharge plan remains to [facility]. Patient noted having been at [facility] 2 other times in past.  Patient on 2L oxygen-patient noted not on oxygen at home. SW spoke with Rebecca Wells from [facility] on referral on patient. Facility has accepted patient-bed no available until Friday. Will need PT/OT evals on patient. HENS, face sheet and envelope on soft chart. Facility will provide transport by ambulette once medically cleared for discharge. SW to follow.

## 2020-02-19 NOTE — PROGRESS NOTES
Occupational Therapy  OCCUPATIONAL THERAPY INITIAL EVALUATION      Date:2020  Patient Name: Tracie William  MRN: [id number]  : 1940  Room: [address] [place] [address]    Evaluating OT: Roque Abel OTR/L #[id number]    Referring physician: PATITO Pedroza - CNP  AM-PAC Daily Activity Raw Score:   Recommended Adaptive Equipment: ww, BSC, AE for LE dressing and bathing,      Diagnosis: closed fx. sacrum - pubic rami fx's and R sacral ala fx. Subacute Fx R L5 Transverse Process  Chronic Partly Healed R L5 Pars Fx  Chronic Stable L2 Compression Fx  Surgery: CABG, pain pump  Pertinent Medical History: CAD,chronic pain with pump, HTN, OA, osteoporosis, L2 compression fx.- old,  L5 tp fx. Past Medical History:   Diagnosis Date    Anemia 2012    CAD (coronary artery disease)     Chronic back pain     CKD (chronic kidney disease)     Compression fracture of lumbar spine, non-traumatic (HCC)     Compression fracture of thoracic vertebra (HCC)     HTN (hypertension) 2012    OA (osteoarthritis) 2012    Osteoporosis 2012    S/P AVR     TISSUE    Valvular heart disease 2012        Precautions:  Falls, WBAT RLE, spinal neutral mechanics     Home Living: Pt lives alone in a one floor home  with 3 step(s) to enter and one rail(s); bed/bath on main floor   Bathroom setup: tub shower with seat   Equipment owned: ww a, rollator, cane , shower seat  Prior Level of Function:   Mod I  with ADLs did not wear socks,  Assisted by family as needed with IADLs; using rollator for ambulation.    Driving: not in a month- prior only to [employer] and [employer] and                            Medication Management self  Occupation: enjoys word searches and TV    Pain Level: 7/10  R groin increased with WB'ing  Cognition: A&O: 3/3; Follows 2 step directions   Memory:  good    Sequencing:  fair    Problem solving:  fair    Judgement/safety:  fair      Functional Assessment:   Initial Eval Status  Date: 2/19/20 Treatment Status  Date: Short Term Goals=LTG  Treatment frequency: PRN 2-4 x/week   Feeding Independent     Grooming Min A   setup    UB Dressing Mod A   setup   LB Dressing dependent  Mod a with AE prn    Bathing Max A   Mod A with AE prn    Toileting Dependent- catheter  Mod A to C   Bed Mobility  Supine to sit: max A    Sit to supine: n/t  Supine to sit: min A    Sit to supine: min A log rolling   Functional Transfers Sit to stand: mod A x2 with ww and increased cueing for hand placement  Stand to sit:  Mod A x2  Stand pivot: max A x2  Min A with ww   Functional Mobility N/t oor ability to advance R LE  Min A with ww   Balance Sitting:     Static:  SBA    Dynamic:min A   Standing: mod A      Activity Tolerance fair  good   Visual/  Perceptual Glasses: reading         Safety fair                 good     Hand dominance: R   UE ROM: RUE:  WFL  LUE:  WFL  Strength: RUE:  4-/5 LUE:  4-/5   Strength: fair B  Fine Motor Coordination:  WFL    Hearing: WFL  Sensation:  No c/o numbness or tingling  Tone:  WFL  Edema: noted redness and slight edema B LE                            Comments: Upon arrival, patient supine and agreeable to evaluation. OT evaluation performed with  Education on WBAT to RLE and benefits of upright sitting and mobility. At end of session, patient sitting up in chair and with increased comfort from bed positioning and  with call light and phone within reach, all lines and tubes intact. Nursing notified. Skilled O2 monitoring performed throughout evaluation. OT treatment: OT for functional assessment of  ADL, Functional Transfer/Mobility Training, Equipment Needs, Energy Conservation Techniques, Pt/Family Education- spinal neutral mechanics with LE dressing and bathing and log rolling for bed mobility, Ther Ex- deep breathing for edema and pain control. ,performed bed mobility EOB sitting with O2 and BP monitored, intro to AE for LE dressing and bathing, walker safety and education

## 2020-02-19 NOTE — PROGRESS NOTES
Hospitalist Progress Note      SYNOPSIS: Patient admitted on 2/18/2020 for back pain, ambulatory dysfunction      SUBJECTIVE:    Patient seen and examined  Records reviewed. Reviewed consultant notes  She has no current complaints except for pain with movement    Denies focal weakness  Stable overnight. No other overnight issues reported. DIET: DIET GENERAL;  CODE: Full Code    Intake/Output Summary (Last 24 hours) at 2/19/2020 1316  Last data filed at 2/19/2020 0941  Gross per 24 hour   Intake 180 ml   Output 3000 ml   Net -2820 ml       OBJECTIVE:    BP (!) 152/84   Pulse 65   Temp 96.7 °F (35.9 °C) (Oral)   Resp 18   Ht 5' 2\" (1.575 m)   Wt 180 lb (81.6 kg)   SpO2 96%   BMI 32.92 kg/m²     General appearance: No apparent distress, appears stated age and cooperative. HEENT:  Conjunctivae/corneas clear. Neck: Supple. No jugular venous distention. Respiratory: Clear to auscultation bilaterally, normal respiratory effort  Cardiovascular: Regular rate rhythm, normal S1-S2  Abdomen: Soft, nontender, nondistended  Musculoskeletal: No clubbing, cyanosis, no bilateral lower extremity edema. Brisk capillary refill. Skin:  No rashes  on visible skin  Neurologic: awake, alert and following commands     ASSESSMENT:    Active Problems:    HTN (hypertension)    OA (osteoarthritis)    Osteoporosis    Chronic pain    Compression fx, lumbar spine (HCC)    Compression fx, thoracic spine (HCC)    Closed fracture of sacrum, initial encounter ([facility])    DDD (degenerative disc disease), lumbar    Hypoalbuminemia    Unable to ambulate    Physical deconditioning    CKD (chronic kidney disease)    History of aortic valve replacement  Resolved Problems:    * No resolved hospital problems.  *       PLAN:    PT OT  Ortho consultation and evaluations noted  Pain control  Bowel regimen  Disposition planning  Likely subacute rehab versus skilled    DISPOSITION: as above    Medications:  REVIEWED DAILY    Infusion Medications   Scheduled Medications    mineral oil-hydrophilic petrolatum   Topical Daily    aspirin  81 mg Oral Daily    carvedilol  12.5 mg Oral QAM    carvedilol  6.25 mg Oral Nightly    vitamin D  2,000 Units Oral Daily    clopidogrel  75 mg Oral Daily    ferrous sulfate  325 mg Oral BID    magnesium gluconate  250 mg Oral Nightly    rosuvastatin  20 mg Oral Daily    sodium chloride flush  10 mL Intravenous 2 times per day    heparin (porcine)  5,000 Units Subcutaneous Q12H    [Held by provider] furosemide  40 mg Oral Daily     PRN Meds: HYDROcodone-acetaminophen, sodium chloride flush, magnesium hydroxide    Labs:     Recent Labs     02/18/20  1254 02/19/20  0451   WBC 10.2 7.2   HGB 11.1* 10.3*   HCT 36.6 34.0    224       Recent Labs     02/18/20  1404 02/19/20  0451    140   K 4.0 3.9  3.9    104   CO2 24 25   BUN 22 22   CREATININE 1.2* 1.1*   CALCIUM 8.9 8.6       Recent Labs     02/18/20  1404 02/19/20  0451   PROT 5.4* 4.8*   ALKPHOS 149* 130*   ALT 11 8   AST 17 14   BILITOT 0.3 0.3       No results for input(s): INR in the last 72 hours. No results for input(s): Nancy Medici in the last 72 hours. Chronic labs:    Lab Results   Component Value Date    TSH 1.225 11/22/2012    INR 1.1 03/30/2018       Radiology: REVIEWED DAILY    +++++++++++++++++++++++++++++++++++++++++++++++++  [address]  +++++++++++++++++++++++++++++++++++++++++++++++++  NOTE: This report was transcribed using voice recognition software. Every effort was made to ensure accuracy; however, inadvertent computerized transcription errors may be present.

## 2020-02-19 NOTE — CONSULTS
Palliative Care Department  Palliative Care Initial Consult  Provider: Anthony FERNANDES, ASHLEY-BC    Hospital Day: 2    Referring Provider: PATITO Pedroza CNP     Reason for Consult:  []  Code status Discussion  [x]  Assist with goals of care   []  Psychosocial support  [x]  Symptom Management  []  Advanced Care Planning    Chief Complaint: Tracie William is a [de-identified] y.o. female with chief complaint of worsening chronic back pain       Active Hospital Problems    Diagnosis Date Noted    Closed fracture of sacrum, initial encounter ([facility] Tonya Nicole 02/18/2020    DDD (degenerative disc disease), lumbar [M51.36] 02/18/2020    Hypoalbuminemia [E88.09] 02/18/2020    Unable to ambulate [R26.2] 02/18/2020    Physical deconditioning [R53.81] 02/18/2020    CKD (chronic kidney disease) [N18.9] 02/18/2020    History of aortic valve replacement [Z95.2] 02/18/2020    Compression fx, lumbar spine ([facility] [facility] [S32.000A] 11/22/2012    Compression fx, thoracic spine ([address] [S22.000A] 11/22/2012    HTN (hypertension) [I10] 11/22/2012    OA (osteoarthritis) [M19.90] 11/22/2012    Osteoporosis [M81.0] 11/22/2012    Chronic pain [G89.29] 11/22/2012           Palliative Care Encounter/Recommendations:      - Goals of care: continue current management and to be determined     - Code Status: full code     - Symptom Management:    Symptom Medications Number Doses in Past 24 hrs   Pain  Hydrocodone 5/325 mg Q 6H PRN   1   Nausea/vomiting     Bowel Regime/constipation  MOM     Anxiety/agitation/depression     SOB     Appetite     Sleep           PDMP checked. The patient has total of 6 providers and 4 different pharmacies. Has the [facility], [facility]. The morphine pain pump is from [facility]. Patient's current pain is a 7/10, just had a hydrocodone given to her.        Subjective:     HPI:  Tracie William is a [de-identified] y.o. female with significant past medical history of HTN, OA,

## 2020-02-20 ENCOUNTER — HOSPITAL ENCOUNTER (INPATIENT)
Age: 80
LOS: 20 days | Discharge: HOME | DRG: 560 | End: 2020-03-11
Attending: PHYSICAL MEDICINE & REHABILITATION | Admitting: PHYSICAL MEDICINE & REHABILITATION
Payer: MEDICARE

## 2020-02-20 VITALS
SYSTOLIC BLOOD PRESSURE: 133 MMHG | HEART RATE: 77 BPM | OXYGEN SATURATION: 96 % | TEMPERATURE: 98.2 F | BODY MASS INDEX: 34.76 KG/M2 | WEIGHT: 188.9 LBS | HEIGHT: 62 IN | RESPIRATION RATE: 16 BRPM | DIASTOLIC BLOOD PRESSURE: 71 MMHG

## 2020-02-20 PROBLEM — T07.XXXA MULTIPLE FRACTURE: Status: ACTIVE | Noted: 2020-02-20

## 2020-02-20 LAB
ANION GAP SERPL CALCULATED.3IONS-SCNC: 10 MMOL/L (ref 7–16)
BUN BLDV-MCNC: 21 MG/DL (ref 8–23)
CALCIUM SERPL-MCNC: 8.6 MG/DL (ref 8.6–10.2)
CHLORIDE BLD-SCNC: 109 MMOL/L (ref 98–107)
CO2: 23 MMOL/L (ref 22–29)
CREAT SERPL-MCNC: 1 MG/DL (ref 0.5–1)
GFR AFRICAN AMERICAN: >60
GFR NON-AFRICAN AMERICAN: 53 ML/MIN/1.73
GLUCOSE BLD-MCNC: 101 MG/DL (ref 74–99)
POTASSIUM SERPL-SCNC: 3.8 MMOL/L (ref 3.5–5)
SODIUM BLD-SCNC: 142 MMOL/L (ref 132–146)

## 2020-02-20 PROCEDURE — 6360000002 HC RX W HCPCS: Performed by: INTERNAL MEDICINE

## 2020-02-20 PROCEDURE — 6370000000 HC RX 637 (ALT 250 FOR IP): Performed by: FAMILY MEDICINE

## 2020-02-20 PROCEDURE — 6370000000 HC RX 637 (ALT 250 FOR IP): Performed by: INTERNAL MEDICINE

## 2020-02-20 PROCEDURE — 6360000002 HC RX W HCPCS: Performed by: NURSE PRACTITIONER

## 2020-02-20 PROCEDURE — 36415 COLL VENOUS BLD VENIPUNCTURE: CPT

## 2020-02-20 PROCEDURE — 6370000000 HC RX 637 (ALT 250 FOR IP): Performed by: PHYSICAL MEDICINE & REHABILITATION

## 2020-02-20 PROCEDURE — 97530 THERAPEUTIC ACTIVITIES: CPT

## 2020-02-20 PROCEDURE — G0378 HOSPITAL OBSERVATION PER HR: HCPCS

## 2020-02-20 PROCEDURE — 96372 THER/PROPH/DIAG INJ SC/IM: CPT

## 2020-02-20 PROCEDURE — 1280000000 HC REHAB R&B

## 2020-02-20 PROCEDURE — 2700000000 HC OXYGEN THERAPY PER DAY

## 2020-02-20 PROCEDURE — 2580000003 HC RX 258: Performed by: INTERNAL MEDICINE

## 2020-02-20 PROCEDURE — 2580000003 HC RX 258: Performed by: NURSE PRACTITIONER

## 2020-02-20 PROCEDURE — 80048 BASIC METABOLIC PNL TOTAL CA: CPT

## 2020-02-20 PROCEDURE — 97535 SELF CARE MNGMENT TRAINING: CPT

## 2020-02-20 PROCEDURE — 6370000000 HC RX 637 (ALT 250 FOR IP): Performed by: NURSE PRACTITIONER

## 2020-02-20 RX ORDER — ASPIRIN 81 MG/1
81 TABLET, CHEWABLE ORAL DAILY
Status: CANCELLED | OUTPATIENT
Start: 2020-02-21

## 2020-02-20 RX ORDER — DOCUSATE SODIUM 100 MG/1
100 CAPSULE, LIQUID FILLED ORAL 2 TIMES DAILY
Status: DISCONTINUED | OUTPATIENT
Start: 2020-02-20 | End: 2020-03-03

## 2020-02-20 RX ORDER — PETROLATUM 42 G/100G
OINTMENT TOPICAL DAILY
Status: CANCELLED | OUTPATIENT
Start: 2020-02-21

## 2020-02-20 RX ORDER — ROSUVASTATIN CALCIUM 20 MG/1
20 TABLET, COATED ORAL DAILY
Status: DISCONTINUED | OUTPATIENT
Start: 2020-02-20 | End: 2020-03-11 | Stop reason: HOSPADM

## 2020-02-20 RX ORDER — M-VIT,TX,IRON,MINS/CALC/FOLIC 27MG-0.4MG
1 TABLET ORAL DAILY
COMMUNITY

## 2020-02-20 RX ORDER — SODIUM CHLORIDE 0.9 % (FLUSH) 0.9 %
10 SYRINGE (ML) INJECTION PRN
Status: CANCELLED | OUTPATIENT
Start: 2020-02-20

## 2020-02-20 RX ORDER — ASPIRIN 81 MG/1
81 TABLET, CHEWABLE ORAL DAILY
Status: DISCONTINUED | OUTPATIENT
Start: 2020-02-21 | End: 2020-03-11 | Stop reason: HOSPADM

## 2020-02-20 RX ORDER — FUROSEMIDE 40 MG/1
40 TABLET ORAL DAILY
Status: CANCELLED | OUTPATIENT
Start: 2020-02-21

## 2020-02-20 RX ORDER — CHOLECALCIFEROL (VITAMIN D3) 50 MCG
2000 TABLET ORAL DAILY
Status: CANCELLED | OUTPATIENT
Start: 2020-02-21

## 2020-02-20 RX ORDER — HYDROCODONE BITARTRATE AND ACETAMINOPHEN 5; 325 MG/1; MG/1
1 TABLET ORAL EVERY 6 HOURS PRN
Status: CANCELLED | OUTPATIENT
Start: 2020-02-20

## 2020-02-20 RX ORDER — CHOLECALCIFEROL (VITAMIN D3) 50 MCG
2000 TABLET ORAL DAILY
Status: DISCONTINUED | OUTPATIENT
Start: 2020-02-21 | End: 2020-03-11 | Stop reason: HOSPADM

## 2020-02-20 RX ORDER — CARVEDILOL 6.25 MG/1
12.5 TABLET ORAL EVERY MORNING
Status: CANCELLED | OUTPATIENT
Start: 2020-02-21

## 2020-02-20 RX ORDER — ROSUVASTATIN CALCIUM 20 MG/1
20 TABLET, COATED ORAL DAILY
Status: CANCELLED | OUTPATIENT
Start: 2020-02-20

## 2020-02-20 RX ORDER — FERROUS SULFATE 325(65) MG
325 TABLET ORAL 2 TIMES DAILY
Status: DISCONTINUED | OUTPATIENT
Start: 2020-02-20 | End: 2020-03-11 | Stop reason: HOSPADM

## 2020-02-20 RX ORDER — SODIUM CHLORIDE 0.9 % (FLUSH) 0.9 %
10 SYRINGE (ML) INJECTION PRN
Status: DISCONTINUED | OUTPATIENT
Start: 2020-02-20 | End: 2020-02-29

## 2020-02-20 RX ORDER — MAGNESIUM GLUCONATE 27 MG(500)
250 TABLET ORAL NIGHTLY
Status: CANCELLED | OUTPATIENT
Start: 2020-02-20

## 2020-02-20 RX ORDER — HYDROCODONE BITARTRATE AND ACETAMINOPHEN 5; 325 MG/1; MG/1
1 TABLET ORAL EVERY 6 HOURS PRN
Status: DISCONTINUED | OUTPATIENT
Start: 2020-02-20 | End: 2020-02-26

## 2020-02-20 RX ORDER — FUROSEMIDE 40 MG/1
40 TABLET ORAL DAILY
Status: DISCONTINUED | OUTPATIENT
Start: 2020-02-21 | End: 2020-03-11 | Stop reason: HOSPADM

## 2020-02-20 RX ORDER — CARVEDILOL 6.25 MG/1
6.25 TABLET ORAL NIGHTLY
Status: CANCELLED | OUTPATIENT
Start: 2020-02-20

## 2020-02-20 RX ORDER — CLOPIDOGREL BISULFATE 75 MG/1
75 TABLET ORAL DAILY
Status: CANCELLED | OUTPATIENT
Start: 2020-02-21

## 2020-02-20 RX ORDER — PETROLATUM 42 G/100G
OINTMENT TOPICAL DAILY
Status: DISCONTINUED | OUTPATIENT
Start: 2020-02-21 | End: 2020-03-11 | Stop reason: HOSPADM

## 2020-02-20 RX ORDER — FERROUS SULFATE 325(65) MG
325 TABLET ORAL 2 TIMES DAILY
Status: CANCELLED | OUTPATIENT
Start: 2020-02-20

## 2020-02-20 RX ORDER — MAGNESIUM GLUCONATE 27 MG(500)
250 TABLET ORAL NIGHTLY
Status: DISCONTINUED | OUTPATIENT
Start: 2020-02-20 | End: 2020-03-11 | Stop reason: HOSPADM

## 2020-02-20 RX ORDER — HEPARIN SODIUM 10000 [USP'U]/ML
5000 INJECTION, SOLUTION INTRAVENOUS; SUBCUTANEOUS EVERY 12 HOURS
Status: CANCELLED | OUTPATIENT
Start: 2020-02-20

## 2020-02-20 RX ORDER — CARVEDILOL 6.25 MG/1
12.5 TABLET ORAL EVERY MORNING
Status: DISCONTINUED | OUTPATIENT
Start: 2020-02-21 | End: 2020-03-11 | Stop reason: HOSPADM

## 2020-02-20 RX ORDER — SODIUM CHLORIDE 0.9 % (FLUSH) 0.9 %
10 SYRINGE (ML) INJECTION EVERY 12 HOURS SCHEDULED
Status: CANCELLED | OUTPATIENT
Start: 2020-02-20

## 2020-02-20 RX ORDER — SODIUM CHLORIDE 0.9 % (FLUSH) 0.9 %
10 SYRINGE (ML) INJECTION EVERY 12 HOURS SCHEDULED
Status: DISCONTINUED | OUTPATIENT
Start: 2020-02-20 | End: 2020-02-29

## 2020-02-20 RX ORDER — NYSTATIN 100000 U/G
OINTMENT TOPICAL 2 TIMES DAILY
Status: DISCONTINUED | OUTPATIENT
Start: 2020-02-21 | End: 2020-03-11 | Stop reason: HOSPADM

## 2020-02-20 RX ORDER — CLOPIDOGREL BISULFATE 75 MG/1
75 TABLET ORAL DAILY
Status: DISCONTINUED | OUTPATIENT
Start: 2020-02-21 | End: 2020-03-11 | Stop reason: HOSPADM

## 2020-02-20 RX ORDER — POLYETHYLENE GLYCOL 3350 17 G/17G
17 POWDER, FOR SOLUTION ORAL DAILY PRN
Status: DISCONTINUED | OUTPATIENT
Start: 2020-02-20 | End: 2020-03-11 | Stop reason: HOSPADM

## 2020-02-20 RX ORDER — CARVEDILOL 6.25 MG/1
6.25 TABLET ORAL NIGHTLY
Status: DISCONTINUED | OUTPATIENT
Start: 2020-02-20 | End: 2020-03-11 | Stop reason: HOSPADM

## 2020-02-20 RX ORDER — HEPARIN SODIUM 10000 [USP'U]/ML
5000 INJECTION, SOLUTION INTRAVENOUS; SUBCUTANEOUS EVERY 12 HOURS
Status: DISCONTINUED | OUTPATIENT
Start: 2020-02-20 | End: 2020-03-11 | Stop reason: HOSPADM

## 2020-02-20 RX ADMIN — Medication 250 MG: at 21:03

## 2020-02-20 RX ADMIN — HYDROCODONE BITARTRATE AND ACETAMINOPHEN 1 TABLET: 5; 325 TABLET ORAL at 07:21

## 2020-02-20 RX ADMIN — Medication 2000 UNITS: at 09:03

## 2020-02-20 RX ADMIN — SKIN PROTECTANT: 44 OINTMENT TOPICAL at 09:14

## 2020-02-20 RX ADMIN — ASPIRIN 81 MG 81 MG: 81 TABLET ORAL at 09:04

## 2020-02-20 RX ADMIN — HEPARIN SODIUM 5000 UNITS: 10000 INJECTION INTRAVENOUS; SUBCUTANEOUS at 19:37

## 2020-02-20 RX ADMIN — HYDROCODONE BITARTRATE AND ACETAMINOPHEN 1 TABLET: 5; 325 TABLET ORAL at 13:58

## 2020-02-20 RX ADMIN — HYDROCODONE BITARTRATE AND ACETAMINOPHEN 1 TABLET: 5; 325 TABLET ORAL at 01:07

## 2020-02-20 RX ADMIN — HYDROCODONE BITARTRATE AND ACETAMINOPHEN 1 TABLET: 5; 325 TABLET ORAL at 20:02

## 2020-02-20 RX ADMIN — FUROSEMIDE 40 MG: 40 TABLET ORAL at 09:04

## 2020-02-20 RX ADMIN — FERROUS SULFATE TAB 325 MG (65 MG ELEMENTAL FE) 325 MG: 325 (65 FE) TAB at 09:04

## 2020-02-20 RX ADMIN — NYSTATIN AND TRIAMCINOLONE ACETONIDE: 100000; 1 CREAM TOPICAL at 15:00

## 2020-02-20 RX ADMIN — FERROUS SULFATE TAB 325 MG (65 MG ELEMENTAL FE) 325 MG: 325 (65 FE) TAB at 19:36

## 2020-02-20 RX ADMIN — NYSTATIN AND TRIAMCINOLONE ACETONIDE: 100000; 1 CREAM TOPICAL at 21:03

## 2020-02-20 RX ADMIN — DOCUSATE SODIUM 100 MG: 100 CAPSULE, LIQUID FILLED ORAL at 21:04

## 2020-02-20 RX ADMIN — HEPARIN SODIUM 5000 UNITS: 10000 INJECTION, SOLUTION INTRAVENOUS; SUBCUTANEOUS at 06:22

## 2020-02-20 RX ADMIN — CARVEDILOL 12.5 MG: 6.25 TABLET, FILM COATED ORAL at 09:03

## 2020-02-20 RX ADMIN — ROSUVASTATIN CALCIUM 20 MG: 20 TABLET, FILM COATED ORAL at 21:04

## 2020-02-20 RX ADMIN — SODIUM CHLORIDE, PRESERVATIVE FREE 10 ML: 5 INJECTION INTRAVENOUS at 20:55

## 2020-02-20 RX ADMIN — SODIUM CHLORIDE, PRESERVATIVE FREE 10 ML: 5 INJECTION INTRAVENOUS at 09:02

## 2020-02-20 RX ADMIN — CLOPIDOGREL 75 MG: 75 TABLET, FILM COATED ORAL at 09:04

## 2020-02-20 RX ADMIN — CARVEDILOL 6.25 MG: 6.25 TABLET, FILM COATED ORAL at 19:36

## 2020-02-20 ASSESSMENT — PAIN SCALES - GENERAL
PAINLEVEL_OUTOF10: 8
PAINLEVEL_OUTOF10: 5
PAINLEVEL_OUTOF10: 8
PAINLEVEL_OUTOF10: 6
PAINLEVEL_OUTOF10: 0
PAINLEVEL_OUTOF10: 0
PAINLEVEL_OUTOF10: 6
PAINLEVEL_OUTOF10: 4

## 2020-02-20 ASSESSMENT — PAIN DESCRIPTION - LOCATION
LOCATION: HIP;GROIN;BACK
LOCATION: HIP;PELVIS

## 2020-02-20 ASSESSMENT — PAIN DESCRIPTION - PAIN TYPE: TYPE: ACUTE PAIN

## 2020-02-20 ASSESSMENT — PAIN DESCRIPTION - ORIENTATION: ORIENTATION: RIGHT;LOWER

## 2020-02-20 ASSESSMENT — PAIN DESCRIPTION - DESCRIPTORS
DESCRIPTORS: ACHING;DISCOMFORT;PRESSURE
DESCRIPTORS: ACHING

## 2020-02-20 ASSESSMENT — PAIN DESCRIPTION - FREQUENCY: FREQUENCY: INTERMITTENT

## 2020-02-20 NOTE — PROGRESS NOTES
fair                 Functional Assessment:    Initial Eval Status  Date: 2/19/20 Treatment Status  Date: 2/20/20 Short Term Goals=LTG  Treatment frequency: PRN 2-4 x/week   Feeding Independent  Independent     Grooming Min A   SBA  Pt washed face, combed hair, applied deodorant seated setup    UB Dressing Mod A  modA  [facility] gown  setup   LB Dressing dependent  Dependent  [facility] socks Mod a with AE prn    Bathing Max A   modA  Pt completed of sponge bath task, with pt able to wash of UB and paul area, requiring assistance with lower legs and to stand to wash buttocks Mod A with AE prn    Toileting Dependent- catheter  Dependent  Pt having of li catheter Mod A to C   Bed Mobility  Supine to sit: max A    Sit to supine: n/t  modA  Supine<>EOB Supine to sit: min A    Sit to supine: min A log rolling   Functional Transfers Sit to stand: mod A x2 with ww and increased cueing for hand placement  Stand to sit:  Mod A x2  Stand pivot: max A x2  minAx2  Sit to Stand  Stand to Sit  Stand Pivot Transfer from EOB<>Chair Min A with ww   Functional Mobility N/t due to inability to advance R LE  modAx2  Pt took of short steps with w.w during transfer Min A with ww   Balance Sitting:     Static:  SBA    Dynamic:min A   Standing: mod A  Sitting:     Static:  SBA    Dynamic:min A   Standing: mod A       Activity Tolerance fair  Fair good   Visual/  Perceptual Glasses: reading           Safety fair                  good      Hand dominance: R   UE ROM:        RUE:  WFL                  LUE:  WFL  Strength:        RUE:  4-/5       LUE:  4-/5   Strength: fair B  Fine Motor Coordination:  WFL     Hearing: WFL  Sensation:  No c/o numbness or tingling  Tone:  WFL  Edema: noted redness and slight edema B LE         Education:  Pt was educated through out treatment regarding proper technique & safety with bed mobility, functional transfers & mobility, ADL compensatory strategies to ease tasks to improve safety & prevent falls and allow pt to return home safely. Comments: Upon arrival pt lying supine in bed, agreeable to therapy. Pt completed of bed mobility, transfers, functional mobility and sponge bath/dressing tasks throughout session. At end of session, pt sitting upright in chair, call light within reach. · Pt has made fair progress towards set goals.    · Continue with current plan of care      Treatment Time In: 8:30am          Treatment Time Out: 8:54am                Treatment Charges: Mins Units   Ther Ex  53899     Manual Therapy 20262     Thera Activities 45291 8 1   ADL/Home Mgt 07516 16 1   Neuro Re-ed 08963     Group Therapy      Orthotic manage/training  54135     Non-Billable Time     Total Timed Treatment 24 2        Echo Soliman SEWELL/L [id number]

## 2020-02-20 NOTE — PROGRESS NOTES
Physical Therapy  Facility/Department: [address] NEURO SPINE  Daily Treatment Note  NAME: Amaya Driscoll  : 1940  MRN: [id number]    Date of Service: 2020     Referring Provider:  ERIC Russell CNP     Evaluating PT:  Graham Haynes PT, DPT PT [id number]     Room #:  6208/7916-B  Diagnosis:  Superior and Inferior Pubic Rami on the Right as well as Right Sacral Alar Fracture  PMHx/PSHx:  Anemia, HTN, OA, Valvular Heart Disease, CKD, CAD, Chronic Back Pain  Subacute Fx R L5 Transverse Process  Chronic Partly Healed R L5 Pars Fx  Chronic Stable L2 Compression Fx        Procedure/Surgery:  None  Precautions:  WBAT RLE, Falls, Spinal Neutral,   Equipment Needs:  TBD     SUBJECTIVE:     Pt lives alone in a 1 story home with 3 stairs to enter and single rail. Bed is on first floor and bath is on first floor. Pt ambulated with rollator independently PTA. Has not driven ~ 1 month. Equipment Owned: [employer]; Rollator,      OBJECTIVE:    Initial Evaluation  Date: 2020 Treatment Date: 2020 Short Term/ Long Term   Goals   AM-PAC 6 Clicks      Was pt agreeable to Eval/treatment? Yes  Yes     Does pt have pain?  7/10 pain R Hip with mobility  10 pain R hip in stance     Bed Mobility  Rolling: MaxA  Supine to sit: MaxA x 2  Sit to supine: NT  Scooting: MaxA Rolling: ModA  Supine to sit: ModA  Sit to supine: NT  Scooting: ModA Rolling: SBA  Supine to sit: Melany  Sit to supine: Melany  Scooting: Melany   Transfers Sit to stand: ModA x 2  Stand to sit: ModA x 2  Stand pivot: MaxA x 2 [employer] Sit to stand: Melany x 2   Stand to sit: Melany x 2  Stand pivot: ModA x 2 WW  Sit to stand: Melany  Stand to sit: Melany  Stand pivot: Melany   Ambulation    3 feet with MaxA x 2 [employer]  3' ModA x 2 [employer]  (Increased time) >5 feet with ModA [employer]   Stair negotiation: ascended and descended  NT   >4 steps with single rail ModA   ROM BUE:  See OT Note  BLE:  WFL       Strength BUE:  See OT Note  RLE: 2+/5 hip and knee  3/5 ankle  LLE: 3-/5 hip and knee  3/5 ankle   Improve Strength 1/3 MMT Grade   Balance Sitting EOB:  Melany  Dynamic Standing:  MaxA x 2 [employer]  Sitting EOB:  Melany  Dynamic Standing:  ModA x 2 [employer] Sitting EOB:  SBA  Dynamic Standing:  Melany [employer]      Pt is A & O x 4  Sensation:  Pt denies numbness and tingling to extremities  Edema:  None     Vitals:  HR 75  Spo2 94% RA  PRE  HR 74  Spo2 97% RA  POST      Therapeutic Exercises:    Manually resisted knee flexion 1x 30  Manually resisted knee extension 1 x 30  Ankle pumps B 1x 30  AAROM Hip flexion 1 x 30  AAROM Hip flexion 1x 30    Patient education  Pt educated on role of PT, performing seated exercise as HEP    Patient response to education:   Pt verbalized understanding Pt demonstrated skill Pt requires further education in this area   x x x     ASSESSMENT:    Comments:  Pt received in supine agreeable to PT. Pt with improved activity tolerance this session. Pt requiring assistance with RLE and trunk for bed mobility. Pt sitting statically without assistance. Pt performing seated therapeutic exercise at edge of bed to improve strength, endurance, and facilitate independence with mobility. Pt performing sit to stand transfer with verbal cues and assistance. Pt ambulating with decreased speed, step length, and step height. Pt required assistance with BLEs (R>L), however improved from previous session. PT will continue with plan of care, and progress pt as able. Treatment:  Patient practiced and was instructed in the following treatment:     Bed mobility- verbal cues for positioning and sequencing to promote independence.  Functional Transfers- Verbal cues for proper positioning and sequencing to perform transfers safely with maximum independence.  Gait Training- Verbal cues for proper positioning and sequencing using assistive device to maximize functional mobility independence.    Seated therapeutic exercise-  Improve strength, endurance, and facilitate independence with functional mobility    PLAN:    Patient is making good progress towards established goals. Will continue with current POC.       Time in  0810  Time out  0835    Total Treatment Time  25 minutes     CPT codes:  [] Gait training 04734 0 minutes  [] Manual therapy 58250 0 minutes  [x] Therapeutic activities 15592 25 minutes  [] Therapeutic exercises 79684  0 minutes  [] Neuromuscular reeducation 33734 0 minutes    Darrell Domingo PT, DPT  [id number]

## 2020-02-20 NOTE — DISCHARGE INSTR - COC
Continuity of Care Form    Patient Name: Riaz Royal   :  1940  MRN:  [id number]    Admit date:  2020  Discharge date:  2020    Code Status Order: Full Code   Advance Directives:   Advance Care Flowsheet Documentation     Date/Time Healthcare Directive Type of Healthcare Directive Copy in [address] Agent's Name Healthcare Agent's Phone Number    20 1027  Yes, patient has an advance directive for healthcare treatment  Durable power of  for health care;Living will  No, copy requested from other (See comment) Patient  Adult Children  Elyssa Rios  --    20 1608  Yes, patient has an advance directive for healthcare treatment  Durable power of  for health care;Living will  --  --  Elyssa Rios son   [phone]          Admitting Physician:  Anastasia Cuadra MD  PCP: Marisa Alejandre DO    Discharging Nurse: [facility] Unit/Room#: 2010/8905-T  Discharging Unit Phone Number: [phone]    Emergency Contact:   Extended Emergency Contact Information  Primary Emergency Contact: Girish Bhakta   [address] Phone: [phone]  Mobile Phone: [phone]  Relation: Child    Past Surgical History:  Past Surgical History:   Procedure Laterality Date    CORONARY ARTERY BYPASS GRAFT      ECHO COMPL W DOP COLOR FLOW  2012         PAIN MANAGEMENT PROCEDURE      MORPHINE PAIN PUMP       Immunization History:   Immunization History   Administered Date(s) Administered    Influenza Virus Vaccine 2012    Pneumococcal Polysaccharide ([id number]) 2012       Active Problems:  Patient Active Problem List   Diagnosis Code    Fall due to stumbling W01. 0XXA    Anemia D64.9    HTN (hypertension) I10    OA (osteoarthritis) M19.90    Spinal stenosis M48.00    Osteoporosis M81.0    Chronic pain G89.29    Facial contusion S00.83XA    Compression fx, lumbar spine (HCC) S32.000A    [facility] S22.000A    Valvular heart disease I38    Tachycardia R00.0    Closed fracture of nasal bone S02. 2XXA    Acute kidney injury ([facility] N17.9    Acute renal failure (ARF) (HCC) N17.9    Closed fracture of sacrum, initial encounter ([facility] S32.10XA    DDD (degenerative disc disease), lumbar M51.36    Hypoalbuminemia E88.09    Unable to ambulate R26.2    Physical deconditioning R53.81    CKD (chronic kidney disease) N18.9    History of aortic valve replacement Z95.2       Isolation/Infection:   Isolation          No Isolation        Patient Infection Status     None to display          Nurse Assessment:  Last Vital Signs: /71   Pulse 77   Temp 98.2 °F (36.8 °C) (Temporal)   Resp 16   Ht 5' 2\" (1.575 m)   Wt 188 lb 14.4 oz (85.7 kg)   SpO2 96%   BMI 34.55 kg/m²     Last documented pain score (0-10 scale): Pain Level: 5  Last Weight:   Wt Readings from Last 1 Encounters:   02/19/20 188 lb 14.4 oz (85.7 kg)     Mental Status:  oriented and alert    IV Access:  - None    Nursing Mobility/ADLs:  Walking   Assisted  Transfer  Assisted  Bathing  Assisted  Dressing  Assisted  Toileting  Assisted  Feeding  Independent  Med Admin  Assisted  Med Delivery   whole    Wound Care Documentation and Therapy:  Wound 11/21/12 Laceration Face (Active)   Number of days: 2647       Wound 11/21/12 Laceration Nose (Active)   Number of days: 2647        Elimination:  Continence:   · Bowel: Yes  · Bladder: li inserted on admission  Urinary Catheter: Insertion Date: 02/18/2020   Colostomy/Ileostomy/Ileal Conduit: No       Date of Last BM: 02/20/2020    Intake/Output Summary (Last 24 hours) at 2/20/2020 1356  Last data filed at 2/20/2020 1157  Gross per 24 hour   Intake 500 ml   Output 1350 ml   Net -850 ml     I/O last 3 completed shifts:   In: 240 [P.O.:240]  Out: 750 [Urine:750]    Safety Concerns:     History of Falls (last 30 days) and At Risk for Falls    Impairments/Disabilities:      None    Nutrition Therapy:  Current Nutrition Therapy:   - Oral Diet:  General  - Oral Nutrition Supplement:  Low Calorie High Protein  daily    Routes of Feeding: Oral  Liquids: No Restrictions  Daily Fluid Restriction: no  Last Modified Barium Swallow with Video (Video Swallowing Test): not done    Treatments at the Time of Hospital Discharge:   Respiratory Treatments: 2 L nasal cannula   Oxygen Therapy:  is not on home oxygen therapy. Ventilator:    - No ventilator support    Rehab Therapies: Physical Therapy and Occupational Therapy  Weight Bearing Status/Restrictions: No weight bearing restirctions  Other Medical Equipment (for information only, NOT a DME order):  walker, bath bench, bedside commode and hospital bed  Other Treatments: ***    Patient's personal belongings (please select all that are sent with patient):  Glasses, Dentures upper and lower, Jewelry    RN SIGNATURE:  Electronically signed by Oscar Sears RN on 2/20/20 at 1:58 PM    CASE MANAGEMENT/SOCIAL WORK SECTION    Inpatient Status Date: ***    Readmission Risk Assessment Score:  Readmission Risk              Risk of Unplanned Readmission:        8           Discharging to Facility/ Agency   · Name:   · Address:  · Phone:  · Fax:    Dialysis Facility (if applicable)   · Name:  · Address:  · Dialysis Schedule:  · Phone:  · Fax:    / signature: {Esignature:[id number]}    PHYSICIAN SECTION    Prognosis: {Prognosis:7251342362}    Condition at Discharge: 8 [facility] Patient Condition:[id number]}    Rehab Potential (if transferring to Rehab): {Prognosis:4160777764}    Recommended Labs or Other Treatments After Discharge: ***    Physician Certification: I certify the above information and transfer of Frederic Gonzalez  is necessary for the continuing treatment of the diagnosis listed and that she requires {Admit to Appropriate Level of Care:53068} for {GREATER/LESS:394975114} 30 days.      Update Admission H&P: {CHP DME Changes in AJIZN:[id number]}    PHYSICIAN SIGNATURE:  Electronically signed by Bernadette Nevarez MD on 2/20/20 at 3:13 PM

## 2020-02-20 NOTE — PROGRESS NOTES
lumbar    Hypoalbuminemia    Unable to ambulate    Physical deconditioning    CKD (chronic kidney disease)    History of aortic valve replacement  Resolved Problems:    * No resolved hospital problems. *       PLAN:    PT OT  Ortho consultation and evaluations noted  Pain control  Bowel regimen  Disposition planning  Likely subacute rehab versus skilled  We will order some Mycolog cream for both legs because of the desquamation    DISPOSITION: as above    Medications:  REVIEWED DAILY    Infusion Medications   Scheduled Medications    mineral oil-hydrophilic petrolatum   Topical Daily    aspirin  81 mg Oral Daily    carvedilol  12.5 mg Oral QAM    carvedilol  6.25 mg Oral Nightly    vitamin D  2,000 Units Oral Daily    clopidogrel  75 mg Oral Daily    ferrous sulfate  325 mg Oral BID    magnesium gluconate  250 mg Oral Nightly    rosuvastatin  20 mg Oral Daily    sodium chloride flush  10 mL Intravenous 2 times per day    heparin (porcine)  5,000 Units Subcutaneous Q12H    furosemide  40 mg Oral Daily     PRN Meds: HYDROcodone-acetaminophen, sodium chloride flush, magnesium hydroxide    Labs:     Recent Labs     02/18/20  1254 02/19/20  0451   WBC 10.2 7.2   HGB 11.1* 10.3*   HCT 36.6 34.0    224       Recent Labs     02/18/20  1404 02/19/20  0451 02/20/20  0531    140 142   K 4.0 3.9  3.9 3.8    104 109*   CO2 24 25 23   BUN 22 22 21   CREATININE 1.2* 1.1* 1.0   CALCIUM 8.9 8.6 8.6       Recent Labs     02/18/20  1404 02/19/20  0451   PROT 5.4* 4.8*   ALKPHOS 149* 130*   ALT 11 8   AST 17 14   BILITOT 0.3 0.3       No results for input(s): INR in the last 72 hours. No results for input(s): Taras Huslia in the last 72 hours.     Chronic labs:    Lab Results   Component Value Date    TSH 1.225 11/22/2012    INR 1.1 03/30/2018       Radiology: REVIEWED DAILY    +++++++++++++++++++++++++++++++++++++++++++++++++  [address]

## 2020-02-20 NOTE — PROGRESS NOTES
Date: 2/20/2020 Date:  Date:    temperature 98.2     pulse 77     respirations 16     Blood pressure 133/71     Pulse oximeter 96% nasal cannula 2L        ALLERGIES: Lodine [etodolac] and Penicillins    DIET : DIET GENERAL;  Dietary Nutrition Supplements: Low Calorie High Protein Supplement    Current Lab and Diagnostic Tests:   Recent Results (from the past 24 hour(s))   Basic metabolic panel    Collection Time: 02/20/20  5:31 AM   Result Value Ref Range    Sodium 142 132 - 146 mmol/L    Potassium 3.8 3.5 - 5.0 mmol/L    Chloride 109 (H) 98 - 107 mmol/L    CO2 23 22 - 29 mmol/L    Anion Gap 10 7 - 16 mmol/L    Glucose 101 (H) 74 - 99 mg/dL    BUN 21 8 - 23 mg/dL    CREATININE 1.0 0.5 - 1.0 mg/dL    GFR Non-African American 53 >=60 mL/min/1.73    GFR African American >60     Calcium 8.6 8.6 - 10.2 mg/dL     Xr Hip Right (2-3 Views)  Result Date: 2/18/2020  Possible nondisplaced fractures of the anterior pelvic girdle on the right, and more detailed characterization with additional radiographs or noncontrast CT hip and pelvis imaging is suggested for assessment. Xr Knee Left (1-2 Views)  Result Date: 2/18/2020  NO ACUTE FRACTURE OR DISLOCATION OF THE LEFT KNEE Moderate severe tricompartmental osteoarthropathy Joint effusion with soft tissue edema. Ct Lumbar Spine Wo Contrast  Result Date: 2/18/2020  1. Fractures of the right side of the sacrum appear to be of mixed age, some show healing. Acute and subacute right sacral fractures. 2. Subacute fracture right L5 transverse process. 3. Chronic partly healed right L5 pars fracture. 4. Chronic stable L2 compression fracture. 5. Chronic diffuse disc degeneration with increasing rotatory scoliosis and increasing left lateral subluxation L2 on L3. 6. A tiny left renal calculus. 7. Posterior spinal stimulator leads. Xr Pelvis (min 3 Views)  Result Date: 2/18/2020   NORMAL AP PELVIS. NO EVIDENCE OF FRACTURE OR DISLOCATION.        Additional labs or diagnostic bowel movement :2/20/2020    [] Substance use history:  [] Tobacco  [] Alcohol  [] Other     [] Ethnic  [] Cultural  [] Spiritual  [] Language [] Needs  [] Other than English  [] Hearing Impaired  [] Visually Impaired  [] Speaking Impaired  [] Blind    [] Special equipment:  [] Devices/Splints  [] Type   [] Brace   [] Type  [] Bariatric bed  [] Extra wide commode  [] Extra wide wheelchair [] Extra wide walker  [] Nicholas walker  [] Nicholas wheelchair  [] Transfer lift    [x] Other equipment has internal pain pump    FUNCTIONAL STATUS PT / OT / Cornell Phy:  FIM / EVAL Discipline Initial: 2/20/2020 Follow Up:  Current:    Eating OT Independent      Grooming OT Stand by Assist seated     Bathing OT Moderate Assist      Dressing Upper Extremity OT Moderate Assist      Dressing Lower Extremity OT Dependent      Toileting OT Dependent      Toilet Transfers OT Minimum assistance x2      Tub/Shower Transfers OT nt     Homemaking OT nt     Bed Mobility PT Moderate Assist      Bed/Wheelchair Transfers PT Minimum assistance  To Moderate Assist      Locomotion Walk / Wheelchair  Device:  Distance: PT 3' Moderate Assist Wheeled walker      Endurance PT      Expression SP      Social Interaction SP      Problem Solving SP      Memory SP      Comprehension SP      Swallowing SP      Bowel Management NSG      Bladder Management NSG        Comments on Functional Status:  Will benefit from 3 hours of acute rehab therapy daily , improve gait,transfers and self care to supervision level    [x] Able to participate a minimum of 3 hours per day of therapy intervention    Required treatments/services: [x] Rehabilitation nursing [] Dietitian / nurtition                 [x] Case management  [] Respiratory Therapy      [x] Social work   [] Other     Required Therapy:  Therapy Hours per Day Days per Week Therapeutic Interventions Required   [x] Physical Therapy 1.5 5-7 Gait,transfers, Safety, strength, education, endurance    [x] Occupational Therapy 1.5 5-7 ADL's, Safety, strength, education, endurance    [] Speech Pathology      [] Prosthetics / Orthotics       []         Anticipated Discharge Plan:   Anticipated DME Needs:  [x] Home     [] Commode   [x] Alone    [] Wheelchair   [x] Supervised    [] Walker   [] Assist    [] Oxygen        [] Hospital Bed  [] Assisted Living    [] Ramp        [x] To Be Determined      Anticipated Home Health Services:  Anticipated Outpatient Services:  [] PT       [] PT  [] OT      [] OT  [] Speech     [] Speech  [] Nursing     [] Dialysis  [] Aide      [x] To Be Determined  [x] To Be Determined    Anticipated support group:  [] Amputation  [] Multiple Sclerosis  [] Stroke  [] Brain Injury  [] Spinal cord injury  [] Other     Barriers to discharge: none noted    Discharge Support: [] Patient lives alone and does not have a caregiver available     [x] Patient has a caregiver available     [] Discharge plan has been verified with patient's caregiver      [] Caregiver is in agreement with the discharge plan     Expected functional status for safe discharge: Supervision    Patient/support person goals: return home and manage pain    Expected length of stay: 7-10 days    Discussed expected length of stay and agreeable to IRF plan: [x] Yes   [] No    Impairment Group Category: 8.3    Etiological Diagnosis: pelvic fracture    Primary Rehabilitation Diagnosis: pelvic fracture      Electronically signed by Jamila Del Angel RN on 2/20/2020 at 12:43 PM    Prescreen completed __________________________________ (signature of prescreener)    Date:   2/20/2020 Time:  1325      JUSTIFICATION FOR ADMISSION TO ACUTE REHABILITATION:  Patient has suffered decline in functional abilities for gait, transfers,   ADL's and IADL's as well as endurance. Patient has functional deficits requiring intensive therapy across multiple disciplines in order to return home safely.  Patient will need physician oversight for respiratory issues,

## 2020-02-20 NOTE — LETTER
PORTABLE PATIENT PROFILE  Claudeen Commander  [id number]    MEDICAL DIAGNOSIS/CONDITION:   Patient Active Problem List   Diagnosis    Fall due to stumbling    Anemia    HTN (hypertension)    OA (osteoarthritis)    Spinal stenosis    Osteoporosis    Chronic pain    Facial contusion    Compression fx, lumbar spine (HCC)    Compression fx, thoracic spine (HCC)    Valvular heart disease    Tachycardia    Closed fracture of nasal bone    [facility]    Acute renal failure (ARF) ([facility]    Closed fracture of sacrum, initial encounter ([facility]    DDD (degenerative disc disease), lumbar    Hypoalbuminemia    Unable to ambulate    Physical deconditioning    CKD (chronic kidney disease)    History of aortic valve replacement    Multiple fracture       INSURANCE INFORMATION:  Payor: MEDICARE /  /  /     ADVANCED DIRECTIVES:   Advance Directives (For Healthcare)  Pre-existing DNR Comfort Care/DNR Arrest/DNI Order: No  Healthcare Directive: Yes, patient has an advance directive for healthcare treatment  Type of Healthcare Directive: Durable power of  for health care  Copy in Chart: No, copy requested from family  Healthcare Agent's Name: [address] [place] [address] [email or web address] Agent's Phone Number: [phone]  If you are unable to speak for yourself, does your Healthcare Agent or Legal Spokesperson know your healthcare wishes?: Yes  [unfilled]     EMERGENCY CONTACT:       RISK FACTORS:   Social History     Tobacco Use    Smoking status: Never Smoker    Smokeless tobacco: Never Used   Substance Use Topics    Alcohol use: No       ALLERGIES:  Allergies   Allergen Reactions    Lodine [Etodolac] Hives    Penicillins        IMMUNIZATIONS:  Immunization History   Administered Date(s) Administered    Influenza Virus Vaccine 11/22/2012    Pneumococcal Polysaccharide ([id number]) 11/22/2012       SWALLOWING:   Difficulty Chewing or Swallowing Food: No    VISION AND HEARING:   Sensory Problems Visual impairment: Glasses    PHYSICIANS INVOLVED WITH CARE:    Wesley Guerrier MD  No ref.  provider found  DO Wesley Huang MD

## 2020-02-20 NOTE — PROGRESS NOTES
Palliative Medicine   Progression of Care     Patient follows with [facility]. Plan is for [facility]. Bed is available on Friday. Spoke with Dr. Nixon Simeon. Will sign off at this time. Thank you.      Palliative Care following closely for support of patient and family   Fatmata CAPUTO-CNP, AGACNP-BC

## 2020-02-20 NOTE — CARE COORDINATION
2/20 Transition of Care. Patient per Ensemble was converted to OBS status. Pt now not eligible for inpatient rehab per medicare guidelines. Since OBS would be self pay. Patient PT/OT lencho imp. Order for PM&R Consult.  Catarina Forrester RN CM

## 2020-02-20 NOTE — DISCHARGE SUMMARY
of Images: 2 views Indication:   pain pain Comparison: None. Findings: There is no evidence of fracture or subluxation in the left knee. Patellofemoral and knee joint spaces are narrowed suggesting a moderate is severe tricompartmental osteoarthropathy most pronounced in the patellofemoral followed by the lateral compartment. . There is no evidence of meniscal calcification or loose bony fragment in the knee joint. There is suggestion of soft tissue joint effusion. There is subcutaneous edema. .     NO ACUTE FRACTURE OR DISLOCATION OF THE LEFT KNEE Moderate severe tricompartmental osteoarthropathy Joint effusion with soft tissue edema. Ct Lumbar Spine Wo Contrast    Result Date: 2020  Patient MRN:  [id number] : 1940 Age: [de-identified] years Gender: Female Order Date:  2020 10:15 AM EXAM: CT LUMBAR SPINE WO CONTRAST INDICATION:  back pain, r/o fx . Patient states has chronic back pains, suddenly worse this morning and unable to walk. Pain in the right hip and back radiating to the groin and thigh. Denies a fall. Has a morphine pain pump. TECHNIQUE: Axial images with sagittal and coronal 2-D reconstructions in bone and soft tissue windows. Dose reduction techniques were used. Contrast is none. Dose is total DLP 1400.16 MG Y CM. COMPARISON: CT lumbar spine without contrast 2017. FINDINGS:  There are new fractures of the right sacral alar. Fracture at the superior margin of the lateral right sacrum paralleling the sacroiliac joint. Vertical fracture through the right sacral alar with mild sclerotic changes in the mid and inferior sacrum indicating that these are several weeks to months old and partially healed. There is a partially healed nondisplaced fracture through the tip of the right L5 transverse process. There is sclerosis indicating a partly healed fracture of the right L5 pars. All of these fractures may be subacute. There is a stable chronic moderate superior endplate fracture deformity at L2.  A chronic small Schmorl's node type fracture deformity of the anterior inferior margin of T12. No definite acute lumbar spine fracture. There is a chronic and increased moderate levoconvex upper and extra convex mid to lower lumbar scoliosis. Worsening of diffuse lumbar degeneration. Mild lumbar lordosis centered at L3. At T10/11, there is moderate loss of disc height with mild marginal spurs, moderate facet degeneration, mild posterior disc bulge with mild calcification. A minimal central stenosis. At T11/12, there is chronic severe right facet degeneration with vacuum degeneration and moderate spurring. Increased mild left facet degeneration. A chronic mild disc bulge with calcification. There could be a tiny left paracentral disc herniation. A mild central stenosis. At T12/L1, there is chronic moderate vacuum degeneration. Increasing marginal spurs and subchondral cyst formation. Increasing right facet degeneration and right neural foraminal stenosis. Mild rotational subluxation. There may be a tiny right paracentral disc herniation. Mild to moderate central stenosis. At L1/L2, there is the old L2 superior endplate fracture. Increased vacuum degeneration in the L1/2 disc. Minimal retropulsion of the posterior right L2 margin. Mild anterior spurring. Mild disc bulge. There are bilateral facet degeneration with a mild rotational subluxation. A mild right posterior and lateral disc bulge, no definite disc herniation. Spinal stimulator leads from posterior approach enter at the L1/2 level and extends superiorly up to the T11/12 level. At L2/L3, there is chronic severe flattening of the disc space with large vacuum disc degeneration, mild disc bulge, and a moderate facet hypertrophy left slightly worse than right. Mild left facet vacuum degeneration. Ligament thickening and central spurring is causing a moderate to severe central stenosis and bilateral neural foraminal stenosis. A 1 cm left lateral subluxation L2 on L3. Right and left lateral disc bulges and a minimal posterior disc bulge. At L3/L4, there is chronic severe flattening of the disc space with moderate left lateral spurs. Severe vacuum degeneration. A chronic 3 mm anterior subluxation. Moderate bilateral facet hypertrophy right worse than left. Ligament thickening and central spurring causing a moderate central stenosis and severe bilateral lateral recess and neural foraminal stenosis. At L4/L5, there is chronic severe flattening of the disc space with large vacuum disc degeneration and large left lateral spurs. Severe left and moderate right facet degeneration. Moderate central stenosis. Severe left and mild right neural foraminal stenosis. Moderate ligamentous thickening on the right side. Mild posterior left disc bulge and small posterior left bone spurs. At L5/S1, there is chronic severe flattening of the disc space with severe vacuum disc degeneration. Moderate right and left lateral bone spurs. A chronic 3 mm anterior subluxation L5 on S1. Severe right and left facet degeneration. Moderate central stenosis. Severe bilateral neural foraminal stenosis left worse than right. Right sacral fractures as discussed above. Left sacroiliac joint shows minimal inferior sclerotic changes, no obvious left sacral fracture. Incidental note is made of a densely calcified 1.3 cm x 1 cm splenic artery aneurysm. Mild bibasilar atelectasis. No obvious pleural effusion. Aorta is calcified and not dilated. No hydronephrosis. A 1 mm calcification in the anterior mid left kidney. Tiny cyst off the inferior lateral left kidney. 1. Fractures of the right side of the sacrum appear to be of mixed age, some show healing. Acute and subacute right sacral fractures. 2. Subacute fracture right L5 transverse process. 3. Chronic partly healed right L5 pars fracture. 4. Chronic stable L2 compression fracture.  5. Chronic diffuse disc degeneration with increasing rotatory scoliosis and tablet  Take 75 mg by mouth daily             diclofenac (VOLTAREN) 75 MG EC tablet  Take 75 mg by mouth 2 times daily             ferrous sulfate 325 (65 Fe) MG tablet  Take 325 mg by mouth 2 times daily             furosemide (LASIX) 20 MG tablet  Take 1 tablet by mouth daily             magnesium 30 MG tablet  Take 250 mg by mouth nightly             rosuvastatin (CRESTOR) 20 MG tablet  Take 20 mg by mouth daily                 Discharge Exam:  General appearance: No apparent distress, appears stated age and cooperative. HEENT:  Conjunctivae/corneas clear. Neck: Supple. No jugular venous distention. Respiratory: Clear to auscultation bilaterally, normal respiratory effort  Cardiovascular: Regular rate rhythm, normal S1-S2  Abdomen: Soft, nontender, nondistended  Musculoskeletal: No clubbing, cyanosis, no bilateral lower extremity edema. Brisk capillary refill.    Skin: He has significant erythema with some areas of desquamation of both distal legs-he has a history of lymphedema however they are not currently swollen there is mild edema  Neurologic: awake, alert and following commands       Disposition: acute rehab    Patient Instructions:   REFER TO AVR or MAIN document    Signed:  Komal Real of Internal Medicine  American Board [employer] Geriatric [employer]  2/20/2020, 3:11 PM

## 2020-02-21 LAB
ALBUMIN SERPL-MCNC: 2.7 G/DL (ref 3.5–5.2)
ALP BLD-CCNC: 118 U/L (ref 35–104)
ALT SERPL-CCNC: 8 U/L (ref 0–32)
ANION GAP SERPL CALCULATED.3IONS-SCNC: 12 MMOL/L (ref 7–16)
AST SERPL-CCNC: 15 U/L (ref 0–31)
BASOPHILS ABSOLUTE: 0.07 E9/L (ref 0–0.2)
BASOPHILS RELATIVE PERCENT: 0.9 % (ref 0–2)
BILIRUB SERPL-MCNC: <0.2 MG/DL (ref 0–1.2)
BUN BLDV-MCNC: 20 MG/DL (ref 8–23)
CALCIUM SERPL-MCNC: 8.8 MG/DL (ref 8.6–10.2)
CHLORIDE BLD-SCNC: 104 MMOL/L (ref 98–107)
CO2: 26 MMOL/L (ref 22–29)
CREAT SERPL-MCNC: 1 MG/DL (ref 0.5–1)
EOSINOPHILS ABSOLUTE: 0.85 E9/L (ref 0.05–0.5)
EOSINOPHILS RELATIVE PERCENT: 10.7 % (ref 0–6)
GFR AFRICAN AMERICAN: >60
GFR NON-AFRICAN AMERICAN: 53 ML/MIN/1.73
GLUCOSE BLD-MCNC: 99 MG/DL (ref 74–99)
HCT VFR BLD CALC: 33.8 % (ref 34–48)
HEMOGLOBIN: 10.1 G/DL (ref 11.5–15.5)
IMMATURE GRANULOCYTES #: 0.03 E9/L
IMMATURE GRANULOCYTES %: 0.4 % (ref 0–5)
INR BLD: 1
LYMPHOCYTES ABSOLUTE: 1.7 E9/L (ref 1.5–4)
LYMPHOCYTES RELATIVE PERCENT: 21.4 % (ref 20–42)
MCH RBC QN AUTO: 26.2 PG (ref 26–35)
MCHC RBC AUTO-ENTMCNC: 29.9 % (ref 32–34.5)
MCV RBC AUTO: 87.6 FL (ref 80–99.9)
MONOCYTES ABSOLUTE: 0.85 E9/L (ref 0.1–0.95)
MONOCYTES RELATIVE PERCENT: 10.7 % (ref 2–12)
NEUTROPHILS ABSOLUTE: 4.43 E9/L (ref 1.8–7.3)
NEUTROPHILS RELATIVE PERCENT: 55.9 % (ref 43–80)
PDW BLD-RTO: 17.4 FL (ref 11.5–15)
PLATELET # BLD: 215 E9/L (ref 130–450)
PMV BLD AUTO: 10.8 FL (ref 7–12)
POTASSIUM REFLEX MAGNESIUM: 4 MMOL/L (ref 3.5–5)
PROTHROMBIN TIME: 11.7 SEC (ref 9.3–12.4)
RBC # BLD: 3.86 E12/L (ref 3.5–5.5)
SODIUM BLD-SCNC: 142 MMOL/L (ref 132–146)
TOTAL PROTEIN: 5.1 G/DL (ref 6.4–8.3)
WBC # BLD: 7.9 E9/L (ref 4.5–11.5)

## 2020-02-21 PROCEDURE — 85610 PROTHROMBIN TIME: CPT

## 2020-02-21 PROCEDURE — 80053 COMPREHEN METABOLIC PANEL: CPT

## 2020-02-21 PROCEDURE — 97110 THERAPEUTIC EXERCISES: CPT

## 2020-02-21 PROCEDURE — 97530 THERAPEUTIC ACTIVITIES: CPT

## 2020-02-21 PROCEDURE — 97162 PT EVAL MOD COMPLEX 30 MIN: CPT

## 2020-02-21 PROCEDURE — 6370000000 HC RX 637 (ALT 250 FOR IP): Performed by: INTERNAL MEDICINE

## 2020-02-21 PROCEDURE — 97166 OT EVAL MOD COMPLEX 45 MIN: CPT

## 2020-02-21 PROCEDURE — 36415 COLL VENOUS BLD VENIPUNCTURE: CPT

## 2020-02-21 PROCEDURE — 1280000000 HC REHAB R&B

## 2020-02-21 PROCEDURE — 85025 COMPLETE CBC W/AUTO DIFF WBC: CPT

## 2020-02-21 PROCEDURE — 97535 SELF CARE MNGMENT TRAINING: CPT

## 2020-02-21 PROCEDURE — 6360000002 HC RX W HCPCS: Performed by: INTERNAL MEDICINE

## 2020-02-21 PROCEDURE — 6370000000 HC RX 637 (ALT 250 FOR IP): Performed by: PHYSICAL MEDICINE & REHABILITATION

## 2020-02-21 PROCEDURE — 2580000003 HC RX 258: Performed by: INTERNAL MEDICINE

## 2020-02-21 RX ADMIN — HEPARIN SODIUM 5000 UNITS: 10000 INJECTION INTRAVENOUS; SUBCUTANEOUS at 17:32

## 2020-02-21 RX ADMIN — DOCUSATE SODIUM 100 MG: 100 CAPSULE, LIQUID FILLED ORAL at 08:24

## 2020-02-21 RX ADMIN — FUROSEMIDE 40 MG: 40 TABLET ORAL at 08:23

## 2020-02-21 RX ADMIN — ASPIRIN 81 MG 81 MG: 81 TABLET ORAL at 08:24

## 2020-02-21 RX ADMIN — CARVEDILOL 12.5 MG: 6.25 TABLET, FILM COATED ORAL at 08:24

## 2020-02-21 RX ADMIN — NYSTATIN AND TRIAMCINOLONE ACETONIDE: 100000; 1 CREAM TOPICAL at 21:23

## 2020-02-21 RX ADMIN — CARVEDILOL 6.25 MG: 6.25 TABLET, FILM COATED ORAL at 17:32

## 2020-02-21 RX ADMIN — HYDROCODONE BITARTRATE AND ACETAMINOPHEN 1 TABLET: 5; 325 TABLET ORAL at 21:23

## 2020-02-21 RX ADMIN — NYSTATIN OINTMENT: 100000 OINTMENT TOPICAL at 21:23

## 2020-02-21 RX ADMIN — HYDROCODONE BITARTRATE AND ACETAMINOPHEN 1 TABLET: 5; 325 TABLET ORAL at 08:25

## 2020-02-21 RX ADMIN — Medication 2000 UNITS: at 08:23

## 2020-02-21 RX ADMIN — CLOPIDOGREL 75 MG: 75 TABLET, FILM COATED ORAL at 08:23

## 2020-02-21 RX ADMIN — NYSTATIN AND TRIAMCINOLONE ACETONIDE: 100000; 1 CREAM TOPICAL at 08:26

## 2020-02-21 RX ADMIN — HYDROCODONE BITARTRATE AND ACETAMINOPHEN 1 TABLET: 5; 325 TABLET ORAL at 14:25

## 2020-02-21 RX ADMIN — SODIUM CHLORIDE, PRESERVATIVE FREE 10 ML: 5 INJECTION INTRAVENOUS at 08:25

## 2020-02-21 RX ADMIN — SKIN PROTECTANT: 44 OINTMENT TOPICAL at 08:27

## 2020-02-21 RX ADMIN — HEPARIN SODIUM 5000 UNITS: 10000 INJECTION INTRAVENOUS; SUBCUTANEOUS at 06:04

## 2020-02-21 RX ADMIN — FERROUS SULFATE TAB 325 MG (65 MG ELEMENTAL FE) 325 MG: 325 (65 FE) TAB at 08:24

## 2020-02-21 RX ADMIN — SODIUM CHLORIDE, PRESERVATIVE FREE 10 ML: 5 INJECTION INTRAVENOUS at 21:23

## 2020-02-21 RX ADMIN — ROSUVASTATIN CALCIUM 20 MG: 20 TABLET, FILM COATED ORAL at 21:24

## 2020-02-21 RX ADMIN — DOCUSATE SODIUM 100 MG: 100 CAPSULE, LIQUID FILLED ORAL at 21:24

## 2020-02-21 RX ADMIN — FERROUS SULFATE TAB 325 MG (65 MG ELEMENTAL FE) 325 MG: 325 (65 FE) TAB at 17:32

## 2020-02-21 RX ADMIN — Medication 250 MG: at 21:24

## 2020-02-21 ASSESSMENT — PAIN DESCRIPTION - PAIN TYPE
TYPE: ACUTE PAIN
TYPE: ACUTE PAIN

## 2020-02-21 ASSESSMENT — PAIN SCALES - GENERAL
PAINLEVEL_OUTOF10: 5
PAINLEVEL_OUTOF10: 6
PAINLEVEL_OUTOF10: 5
PAINLEVEL_OUTOF10: 8
PAINLEVEL_OUTOF10: 0
PAINLEVEL_OUTOF10: 8

## 2020-02-21 ASSESSMENT — PAIN DESCRIPTION - ONSET
ONSET: ON-GOING
ONSET: ON-GOING

## 2020-02-21 ASSESSMENT — PAIN DESCRIPTION - DESCRIPTORS
DESCRIPTORS: ACHING;THROBBING
DESCRIPTORS: SHARP;ACHING

## 2020-02-21 ASSESSMENT — PAIN DESCRIPTION - FREQUENCY
FREQUENCY: CONTINUOUS
FREQUENCY: INTERMITTENT

## 2020-02-21 ASSESSMENT — PAIN DESCRIPTION - PROGRESSION: CLINICAL_PROGRESSION: GRADUALLY IMPROVING

## 2020-02-21 ASSESSMENT — PAIN DESCRIPTION - LOCATION: LOCATION: GROIN;BACK
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
## 2020-02-21 NOTE — PROGRESS NOTES
commode  Toilet Transfer: Maximum assistance  Toilet Transfers Comments: vc's for hand placement & safety. Pt require increased assist to manage depends & pants     ADL  Feeding: Minimal assistance;Setup; Dentures  Grooming: Minimal assistance; Increased time to complete;Setup(seated)  UE Bathing: Minimal assistance;Setup  LE Bathing: Maximum assistance;Setup;Verbal cueing; Increased time to complete  UE Dressing: Supervision;Setup(seated EOB)  LE Dressing: Dependent/Total;Verbal cueing; Increased time to complete;Setup(vc's for technique)     Coordination  Movements Are Fluid And Coordinated: No  Quality of Movement Other  Comment: Pt has significant arthritis which does impair coordination but is a long standing issue     Bed mobility  Rolling to Left: Moderate assistance  Rolling to Right: Minimal assistance  Supine to Sit: Moderate assistance  Scooting: Moderate assistance  Transfers  Sit to stand: Moderate assistance  Stand to sit: Moderate assistance     Vision - Basic Assessment  Prior Vision: Wears glasses only for reading  Visual History: No significant visual history  Patient Visual Report: No visual complaint reported. Cognition  Overall Cognitive Status: WFL     Sensation  Overall Sensation Status: WFL      BUE AROM: BUE AROM in all planes WFL  Right & Left Hand AROM: B hand grasp & release WFL & pt able to oppose her thumb to each digit.  Pt does have significant arthritis in her hands    BUE Strength  B Hand General: 4-/5  BUE Strength Comment: Grossly assessed due to back pain 4/5 as evidenced during ADL & transfers     Hand Dominance: Right       Plan   Times per week: OT twice a day for 3 weeks to address above problem areas  Times per day: Twice a day  Current Treatment Recommendations: Pain Management, Positioning, Gait Training, Safety Education & Training, Balance Training, Patient/Caregiver Education & Training, Self-Care / ADL, Functional Mobility Training, Equipment Evaluation, Education, & procurement, Home Management Training, Endurance Training    Assessment   Performance deficits / Impairments: Decreased functional mobility ; Decreased high-level IADLs;Decreased ADL status; Decreased endurance;Decreased fine motor control;Decreased balance;Decreased posture;Decreased safe awareness  Prognosis: Good  Decision Making: Medium Complexity  OT Education: OT Role;Energy Conservation;Plan of Care;Precautions; ADL Adaptive Strategies; Equipment;Transfer Training  REQUIRES OT FOLLOW UP: Yes  Activity Tolerance: Patient limited by pain  Safety Devices in place: Yes  Type of devices: Call light within reach      Treatment Initiated: Pt educated with regards to dressing strategies to maximize pt functional performance & ensure pt safety. Pt educated with regards to body mechanics to facilitate pt ability to move & minimize pt pain    Goals  Long term goals  Time Frame for Long term goals : 3 weeks to address above problem areas  Long term goal 1: Pt demo Mod i to eat all meals  Long term goal 2: Pt demo Mod I grooming seated   Long term goal 3: Pt demo SBA bathing @ shower level seated or sponge bathing both seated & standing  Long term goal 4: Pt demo independent UE & Mod I LE dress with AE as needed  Long term goal 5: Pt demo Mod I commode trf with appropriate DME to ensure pt safety  Long term goals 6: Pt demo SBA tub trf with appropriate DME to ensure pt safety  Long term goal 7: Pt demo Mod I light homemaking activity & demo G- safety & insight  Long term goal 8: Pt demo G- endurance for a 20-30 minute functional activity  Long term goal 9: Pt demo G- safety, insight, reasoning & judgement during functionally based tasks  Patient Goals   Patient goals : \"I need to do the laundry, dust & cook. \"     Therapy Time   Individual Concurrent Group Co-treatment   Time In 600-OT eval  610-OT rx         Time Out 610-OT eval  700-OT rx         Minutes 60 Min OT total  10 MIn OT eval  50 Min OT rx          Marsha Cuellar

## 2020-02-21 NOTE — PROGRESS NOTES
Physical Therapy    Facility/Department: [facility]  Initial Assessment    NAME: Tracie William  : 1940  MRN: [id number]    Date of Service: 2020    Evaluating Therapist: Darling Gates DPT    ROOM: 6302/8003-L  DIAGNOSIS: multiple trauma  PMH: To ED on 20 for back/hip pain. X-ray right hip/CT lumbar spine: Fracture about the superior and inferior pubic rami on the right as well as right sacral alar fracture (Right LC 1 pelvic ring injury). Noted Subacute Fx R L5 Transverse Process. Chronic Partly Healed R L5 Pars Fx and Chronic Stable L2 Compression Fx PMH includes anemia, HTN, OA, osteoporosis, CABG x2, valvular heart disease. PRECAUTIONS: WBAT RLE, Falls, Spinal Neutral,     Pt lives alone in a 1 story home with 3 stairs to enter with a L HR. Bed is on first floor and bath is on first floor. Pt ambulated with rollator independently PTA. Equipment Owned: [employer]; Rollator. Pt reporting shuffled patterning with ambulation PTA. Pt reporting her son lives next door and assists PRN. Initial Evaluation  20   Short Term Goals Long Term Goals    Was pt agreeable to Eval/treatment? yes       Does pt have pain?  3/10 right groin at rest  8/10 right groin with activty        Bed Mobility  Rolling: Julissa  Supine to sit: Julissa  Sit to supine: modA  Scooting: Julissa   sup Mod I   Transfers Sit to stand: min/modA  Stand to sit: min/modA  Stand pivot: min/modA with ww   sba with AAD Mod I with AAD   Ambulation    8 feet with ww with Julissa   50 feet with AAD with sba >150 feet with AAD with mod I   Walking 10 feet on uneven surface NT       Wheel Chair Mobility 76' with bilateral UE sba    50 with bilateral UE sup  >150' with bilateral UE mod I   Car Transfers NT   sup Mod I   Stair negotiation: ascended and descended NT   4 steps with bilateral rail with Julissa >4 steps with one rail with sba   Curb Step:   ascended and descended NT   4 inch step with AAD and Julissa 4 inch step with AAD and sba   Picking up object off the floor NT       BLE ROM WFL (gross knee flexion/extension limitations due to OA/audible crepitus noted)       BLE Strength R LE: hip 3-/5, knee 3+/5, ankle 4/5    L LE: hip 3+/5, knee 4/5, ankle 4/5       Balance  Sitting: Independent   Standing: min/modA with ww       Date Family Teach Completed TBA       Is additional Family Teaching Needed? Y or N Y       Hindering Progress Pain, OA       PT recommended ELOS 3 weeks       Team's Discharge Plan        Therapist at Team Meeting          Pt is alert and Oriented x4  Sensation: intact to LT  Edema: slight bilateral LE (endorsing history of lymphedema)   Endurance: limited by pain   Skin was inspected: bilateral distal LE hemosiderin staining/vascular changes noted  Chair alarm: n/a     ASSESSMENT  Pt displays functional ability as noted in the objective portion of this evaluation. Comments:  Pt sitting in w/c upon arrival, pleasant and cooperative t/o session. Pt endorsing history of chronic knee OA and pain with audible crepitus noted t/o session from bilateral knees and back. Knee valgus noted bilaterally with weight bearing. Pt reporting having a shuffled gait pattern at home with declining function PTA. Pt initially requiring verbal cues for hand placement with transfer that improved t/o session. Transfers and ambulation initially completed with shuffling patterning but demonstrating ability to clear LE's without shuffling with cues for bilateral UE use through the ww. Ambulation completed with step to patterning and antalgic gait patterning, with multiple standing rest breaks required t/o ambulation. Pt will benefit from intensive skilled PT to increase functional mobility and [place] with all aspects of mobility to return home safely. Pending improvement in mobility, pt may require home step barrier modification due to one HR at this time.      Patient education  Pt educated on hand placement with transfers, WBAT patterning with mobility, bed

## 2020-02-21 NOTE — H&P
12.5 mg by mouth every morning    Yes Historical Provider, MD   clopidogrel (PLAVIX) 75 MG tablet Take 75 mg by mouth daily   Yes Historical Provider, MD   rosuvastatin (CRESTOR) 20 MG tablet Take 20 mg by mouth daily Pt. Takes every other day at home.    Yes Historical Provider, MD   Calcium Carbonate-Vitamin D (CALCIUM + D) 600-200 MG-UNIT TABS Take by mouth 2 times daily    Yes Historical Provider, MD   Cholecalciferol (VITAMIN D) 2000 UNITS CAPS capsule Take 2,000 Units by mouth daily    Yes Historical Provider, MD   acetaminophen (TYLENOL) 325 MG tablet Take 650 mg by mouth every 4 hours as needed for Pain (pain 1-3)    Historical Provider, MD      Current Facility-Administered Medications:     magnesium hydroxide (MILK OF MAGNESIA) 400 MG/5ML suspension 30 mL, 30 mL, Oral, Daily PRN, Alejandra De La Rosa MD    sodium chloride flush 0.9 % injection 10 mL, 10 mL, Intravenous, 2 times per day, Alejandra De La Rosa MD, 10 mL at 02/21/20 0825    sodium chloride flush 0.9 % injection 10 mL, 10 mL, Intravenous, PRN, Alejandra De La Rosa MD    aspirin chewable tablet 81 mg, 81 mg, Oral, Daily, Alejandra De La Rosa MD, 81 mg at 02/21/20 4701    carvedilol (COREG) tablet 12.5 mg, 12.5 mg, Oral, QAM, Alejandra De La Rosa MD, 12.5 mg at 02/21/20 7249    carvedilol (COREG) tablet 6.25 mg, 6.25 mg, Oral, Nightly, Alejandra De La Rosa MD, 6.25 mg at 02/20/20 1936    clopidogrel (PLAVIX) tablet 75 mg, 75 mg, Oral, Daily, Alejandra De La Rosa MD, 75 mg at 02/21/20 1034    ferrous sulfate tablet 325 mg, 325 mg, Oral, BID, Alejandra De La Rosa MD, 325 mg at 02/21/20 9788    furosemide (LASIX) tablet 40 mg, 40 mg, Oral, Daily, Alejandra De La Rosa MD, 40 mg at 02/21/20 4236    heparin (porcine) injection 5,000 Units, 5,000 Units, Subcutaneous, Q12H, Alejandra De La Rosa MD, 5,000 Units at 02/21/20 0604    HYDROcodone-acetaminophen (NORCO) 5-325 MG per tablet 1 tablet, 1 tablet, Oral, Q6H PRN, Alejandra De La Rosa MD, 1 tablet at 02/21/20 0878   magnesium gluconate (MAGONATE) tablet 250 mg, 250 mg, Oral, Nightly, Weston Granger MD, 250 mg at 02/20/20 2103    mineral oil-hydrophilic petrolatum (HYDROPHOR) ointment, , Topical, Daily, Weston Granger MD    nystatin-triamcinolone (MYCOLOG II) cream, , Topical, BID, Weston Granger MD    rosuvastatin (CRESTOR) tablet 20 mg, 20 mg, Oral, Daily, Weston Granger MD, 20 mg at 02/20/20 2104    vitamin D tablet 2,000 Units, 2,000 Units, Oral, Daily, Weston Granger MD, 2,000 Units at 02/21/20 6312    polyethylene glycol (GLYCOLAX) packet 17 g, 17 g, Oral, Daily PRN, Niya Wade MD    docusate sodium (COLACE) capsule 100 mg, 100 mg, Oral, BID, Niya Wade MD, 100 mg at 02/21/20 1641    nystatin (MYCOSTATIN) ointment, , Topical, BID, Niya Wade MD    Family History: History reviewed. No pertinent family history. Social/Functional History:  reports that she has never smoked. She has never used smokeless tobacco. She reports that she does not drink alcohol or use drugs. Premorbid Functional Status: Patient was independent with mobility/ambulation, transfers, ADL's, IADL's. Limited mobility and did use a walker. She lives alone. Support System and Family Circumstances (i.e., potential caregivers): children / family / friends to help, son, caregivers available 24 hours per day   Home Environment / Accessibility: 1-story house/ trailer, number of outside stairs: has been sponge bathing, hasn't driven for about a month. Has an ETB. Uses rollator and straight cane for ambulation, tub-shower, Was sponge bathing recently  Communication: follows at least 1-step commands and primary language: English    Review of Systems:Pertinent items are noted in HPI. The remainder of her comprehensive ROS is negative. Physical Exam:    Vitals reviewed. I/O last 3 completed shifts:  In: -   Out: 1250 [Urine:1250]  No intake/output data recorded.       GENERAL ASSESSMENT: well developed and well nourished, smiling and supine in bed in NAD  SKIN: warm and dry and intact. HEAD: normocephalic  EYES: normal eyes  EARS: normal  NOSE: normal external appearance and nares patent  MOUTH: normal mouth and throat  NECK: normal, pain with flexion. Alignment reveals forward flexion. No adenopathy. Trachea midline  CHEST: normal air exchange, no rales, no rhonchi, no wheezes, respiratory effort normal with no retractions  HEART: regular rate and rhythm, normal S1/S2, no murmurs  ABDOMEN: soft, non-distended, no masses, no hepatosplenomegaly  GENITALIA: not examined  SPINE: spine normal, symmetric, difficult to examine as patient is supine in bed. No gross abnormality noted  EXTREMITY: normal and symmetric movement, normal range of motion, no joint swelling, no calf tenderness. Leg lengths equal. Alignment good. No pitting edema. (R) knee is warm and swollen relative to (L). She insists this is long standing and is not interested in considering an injection of the knee.        Detailed Neurological Exam:  Mental Status: Alert, oriented, thought content appropriate    Cranial Nerves:  I: smell NA   II: visual acuity  NA   II: visual fields Full to confrontation   II: pupils ADEBAYO   III,VII: ptosis None   III,IV,VI: extraocular muscles  Full ROM   V: mastication Normal   V: facial light touch sensation  Normal   V,VII: corneal reflex  NA   VII: facial muscle function - upper  Normal   VII: facial muscle function - lower Normal   VIII: hearing Normal   IX: soft palate elevation  Normal   IX,X: gag reflex NA   XI: trapezius strength  5/5   XI: sternocleidomastoid strength 5/5   XI: neck flexion strength  5/5   XII: tongue strength  Normal           Motor:  Right   4+/5              Left   4+/5               Right Bicep  4+/5           Left Bicep  5-/5              Right Triceps   4/5       Left Trceps  4/5          Right Deltoid  4/5     Left Deltoid  4/5         Right IPS  3+/5            Left IPS  3+/5 Right Quadriceps  4/5          Left Quadriceps    4+/5           Right Gastrocnemius    2/5    Left Gastrocnemius   2/5  Right Ant Tibialis   5-/5  Left Ant Tibialis   5-/5        Sensory:  normal to light touch (B) UEs and LEs        Gait: not tested due to safety concerns      Coordination:   test for rapid alternating movements normal      DTR:   Right Brachioradialis reflex 1+  Left Brachioradialis reflex 1+  Right Biceps reflex 1+  Left Biceps reflex 1+  Right Triceps reflex 0  Left Triceps reflex 0  Right Quadriceps reflex 0  Left Quadriceps reflex 0  Right Achilles reflex 0  Left Achilles reflex 0      ASSESSMENT AND PLAN      Patient Active Problem List   Diagnosis    Fall due to stumbling    Anemia    HTN (hypertension)    OA (osteoarthritis)    Spinal stenosis    Osteoporosis    Chronic pain    Facial contusion    Compression fx, lumbar spine (HCC)    Compression fx, thoracic spine (HCC)    Valvular heart disease    Tachycardia    Closed fracture of nasal bone    Acute kidney injury (HCC)    Acute renal failure (ARF) (HCC)    Closed fracture of sacrum, initial encounter [facility] [address]    DDD (degenerative disc disease), lumbar    Hypoalbuminemia    Unable to ambulate    Physical deconditioning    CKD (chronic kidney disease)    History of aortic valve replacement    Multiple fracture       1:  Primary indication for inpatient rehabilitation admission over other settings:  Orthopedic Disorders:  08.9  Other Orthopedic Multiple pelvic fractures and sacral fracture  2:  Comorbidities:  Comorbid Conditions in addition to those listed above None  3. Estimated length of stay:  2 weeks  4. Anticipated disposition:  Home. The potential to achieve that is very good.   5:  Precautions:  falls, infections, skin and weight bearing status:  full      Patient Active Problem List   Diagnosis    Fall due to stumbling    Anemia    HTN (hypertension)    OA (osteoarthritis)    Spinal stenosis    Osteoporosis    Chronic pain    Facial contusion    Compression fx, lumbar spine (HCC)    Compression fx, thoracic spine (HCC)    Valvular heart disease    Tachycardia    Closed fracture of nasal bone    Acute kidney injury ([facility] [address]    Acute renal failure (ARF) (HCC)    Closed fracture of sacrum, initial encounter ([facility] [address]    DDD (degenerative disc disease), lumbar    Hypoalbuminemia    Unable to ambulate    Physical deconditioning    CKD (chronic kidney disease)    History of aortic valve replacement    Multiple fracture         Admitted for comprehensive inpatient rehabilitation including PT, OT, RT, SLP, and supportive services to improve functional outcome of impaired mobility, ADL's, transfers, and self-care. Rehabilitation Potential: excellent    IOPOC: I concur with the preadmission screen except as documented within this note. Requires inpatient rehab as care at a less intensive level risks deterioration. PT 90 minutes per day 5-7 days per week with focus on strengthening, balance, transfers, ambulation, elevations, mat mobility; OT 90 minutes per day 5-7 days per week with focus on UE strengthening, energy conservation, equipment needs evaluation, homemaking, ADLs, IADLs; rehab nursing 24/7 for skin care, bowel and bladder management, education and carryover and  for discharge planning. Current Functional Status:     Initial Evaluation  2/21/20     Short Term Goals Long Term Goals    Was pt agreeable to Eval/treatment? yes           Does pt have pain?  3/10 right groin at rest  8/10 right groin with activty            Bed Mobility  Rolling: Julissa  Supine to sit: Julissa  Sit to supine: modA  Scooting: Julissa     sup Mod I   Transfers Sit to stand: min/modA  Stand to sit: min/modA  Stand pivot: min/modA with ww     sba with AAD Mod I with AAD   Ambulation    8 feet with ww with Julissa     50 feet with AAD with sba >150 feet with AAD with mod I   Walking 10 feet on uneven surface NT           Wheel Report: No visual complaint reported.      Cognition  Overall Cognitive Status: WFL  Sensation  Overall Sensation Status: WFL      BUE AROM: BUE AROM in all planes WFL  Right & Left Hand AROM: B hand grasp & release WFL & pt able to oppose her thumb to each digit. Pt does have significant arthritis in her hands     BUE Strength  B Hand General: 4-/5  BUE Strength Comment: Grossly assessed due to back pain 4/5 as evidenced during ADL & transfers  Hand Dominance: Right  Additional comments:I reviewed the patient's new clinical lab test results. Results for Anjel Royal (MRN [id number]) as of 2/21/2020 08:41   Ref.  Range 2/21/2020 05:49   Sodium Latest Ref Range: 132 - 146 mmol/L 142   Potassium Latest Ref Range: 3.5 - 5.0 mmol/L 4.0   Chloride Latest Ref Range: 98 - 107 mmol/L 104   CO2 Latest Ref Range: 22 - 29 mmol/L 26   BUN Latest Ref Range: 8 - 23 mg/dL 20   Creatinine Latest Ref Range: 0.5 - 1.0 mg/dL 1.0   Anion Gap Latest Ref Range: 7 - 16 mmol/L 12   GFR Non- Latest Ref Range: >=60 mL/min/1.73 53   GFR  Unknown >60   Glucose Latest Ref Range: 74 - 99 mg/dL 99   Calcium Latest Ref Range: 8.6 - 10.2 mg/dL 8.8   Total Protein Latest Ref Range: 6.4 - 8.3 g/dL 5.1 (L)   Albumin Latest Ref Range: 3.5 - 5.2 g/dL 2.7 (L)   Alk Phos Latest Ref Range: 35 - 104 U/L 118 (H)   ALT Latest Ref Range: 0 - 32 U/L 8   AST Latest Ref Range: 0 - 31 U/L 15   Bilirubin Latest Ref Range: 0.0 - 1.2 mg/dL <0.2   WBC Latest Ref Range: 4.5 - 11.5 E9/L 7.9   RBC Latest Ref Range: 3.50 - 5.50 E12/L 3.86   Hemoglobin Quant Latest Ref Range: 11.5 - 15.5 g/dL 10.1 (L)   Hematocrit Latest Ref Range: 34.0 - 48.0 % 33.8 (L)   MCV Latest Ref Range: 80.0 - 99.9 fL 87.6   MCH Latest Ref Range: 26.0 - 35.0 pg 26.2   MCHC Latest Ref Range: 32.0 - 34.5 % 29.9 (L)   MPV Latest Ref Range: 7.0 - 12.0 fL 10.8   RDW Latest Ref Range: 11.5 - 15.0 fL 17.4 (H)   Platelet Count Latest Ref Range: 130 - 450 E9/L 215

## 2020-02-21 NOTE — PROGRESS NOTES
with Julissa >4 steps with one rail with sba   Curb Step:   ascended and descended NT  2 in curb step in // bars modA 4 inch step with AAD and Julissa 4 inch step with AAD and sba   Picking up object off the floor NT       BLE ROM WFL (gross knee flexion/extension limitations due to OA/audible crepitus noted)       BLE Strength R LE: hip 3-/5, knee 3+/5, ankle 4/5    L LE: hip 3+/5, knee 4/5, ankle 4/5       Balance  Sitting: Independent   Standing: min/modA with ww       Date Family Teach Completed TBA       Is additional Family Teaching Needed? Y or N Y       Hindering Progress Pain, OA       PT recommended ELOS 3 weeks       Team's Discharge Plan        Therapist at Team Meeting          Therapeutic Exercise:   PM: 2x STS transfers at w/c, emphasis on hand placement and COM over DEEPAK initially modA however Julissa when using bilateral UE on arm rests  Seated LAQ, hip flexion, AP x20    Patient education  Pt educated on hand placement with transfers, WBAT patterning with all mobility, UE use to facilitate increased step height/toe clearance    Patient response to education:   Pt verbalized understanding Pt demonstrated skill Pt requires further education in this area   yes Yes with vcA yes     Additional Comments: Continued to progress mobility as tolerated. Frequently limited by c/o pain t/o session. Verbal cue for hand placement with transfers that improved transfers when using bilateral UE on arm rests when compared to one hand on the walker and one of the armrest. Pt attempting to lean onto ww on wrists instead of pushing through hands due to wrist discomfort. Improper pattern with curb step despite verbal and tactile cues leading to increased assistance when descending. Will progress as able. PM  Time in: 1300  Time out: 1345    Pt is making fair progress toward established Physical Therapy goals. Continue with physical therapy current plan of care.     Mio Meek DPT  [id number]

## 2020-02-22 PROCEDURE — 6370000000 HC RX 637 (ALT 250 FOR IP): Performed by: PHYSICAL MEDICINE & REHABILITATION

## 2020-02-22 PROCEDURE — 1280000000 HC REHAB R&B

## 2020-02-22 PROCEDURE — 6370000000 HC RX 637 (ALT 250 FOR IP): Performed by: INTERNAL MEDICINE

## 2020-02-22 PROCEDURE — 97110 THERAPEUTIC EXERCISES: CPT

## 2020-02-22 PROCEDURE — 6360000002 HC RX W HCPCS: Performed by: INTERNAL MEDICINE

## 2020-02-22 PROCEDURE — 97530 THERAPEUTIC ACTIVITIES: CPT

## 2020-02-22 RX ADMIN — CLOPIDOGREL 75 MG: 75 TABLET, FILM COATED ORAL at 09:04

## 2020-02-22 RX ADMIN — HYDROCODONE BITARTRATE AND ACETAMINOPHEN 1 TABLET: 5; 325 TABLET ORAL at 16:16

## 2020-02-22 RX ADMIN — FERROUS SULFATE TAB 325 MG (65 MG ELEMENTAL FE) 325 MG: 325 (65 FE) TAB at 17:25

## 2020-02-22 RX ADMIN — NYSTATIN AND TRIAMCINOLONE ACETONIDE: 100000; 1 CREAM TOPICAL at 21:10

## 2020-02-22 RX ADMIN — SKIN PROTECTANT: 44 OINTMENT TOPICAL at 09:05

## 2020-02-22 RX ADMIN — ASPIRIN 81 MG 81 MG: 81 TABLET ORAL at 09:04

## 2020-02-22 RX ADMIN — DOCUSATE SODIUM 100 MG: 100 CAPSULE, LIQUID FILLED ORAL at 21:09

## 2020-02-22 RX ADMIN — FUROSEMIDE 40 MG: 40 TABLET ORAL at 09:05

## 2020-02-22 RX ADMIN — Medication 250 MG: at 21:09

## 2020-02-22 RX ADMIN — NYSTATIN AND TRIAMCINOLONE ACETONIDE: 100000; 1 CREAM TOPICAL at 09:04

## 2020-02-22 RX ADMIN — NYSTATIN OINTMENT: 100000 OINTMENT TOPICAL at 21:13

## 2020-02-22 RX ADMIN — NYSTATIN OINTMENT: 100000 OINTMENT TOPICAL at 09:04

## 2020-02-22 RX ADMIN — CARVEDILOL 6.25 MG: 6.25 TABLET, FILM COATED ORAL at 17:24

## 2020-02-22 RX ADMIN — FERROUS SULFATE TAB 325 MG (65 MG ELEMENTAL FE) 325 MG: 325 (65 FE) TAB at 09:04

## 2020-02-22 RX ADMIN — Medication 2000 UNITS: at 09:04

## 2020-02-22 RX ADMIN — HEPARIN SODIUM 5000 UNITS: 10000 INJECTION INTRAVENOUS; SUBCUTANEOUS at 06:16

## 2020-02-22 RX ADMIN — HYDROCODONE BITARTRATE AND ACETAMINOPHEN 1 TABLET: 5; 325 TABLET ORAL at 09:56

## 2020-02-22 RX ADMIN — CARVEDILOL 12.5 MG: 6.25 TABLET, FILM COATED ORAL at 09:05

## 2020-02-22 RX ADMIN — HYDROCODONE BITARTRATE AND ACETAMINOPHEN 1 TABLET: 5; 325 TABLET ORAL at 22:45

## 2020-02-22 RX ADMIN — DOCUSATE SODIUM 100 MG: 100 CAPSULE, LIQUID FILLED ORAL at 09:04

## 2020-02-22 RX ADMIN — ROSUVASTATIN CALCIUM 20 MG: 20 TABLET, FILM COATED ORAL at 21:11

## 2020-02-22 RX ADMIN — HEPARIN SODIUM 5000 UNITS: 10000 INJECTION INTRAVENOUS; SUBCUTANEOUS at 17:26

## 2020-02-22 ASSESSMENT — PAIN SCALES - GENERAL
PAINLEVEL_OUTOF10: 0
PAINLEVEL_OUTOF10: 6
PAINLEVEL_OUTOF10: 7
PAINLEVEL_OUTOF10: 0
PAINLEVEL_OUTOF10: 0
PAINLEVEL_OUTOF10: 7
PAINLEVEL_OUTOF10: 0
PAINLEVEL_OUTOF10: 4
PAINLEVEL_OUTOF10: 5
PAINLEVEL_OUTOF10: 4

## 2020-02-22 ASSESSMENT — PAIN DESCRIPTION - PROGRESSION
CLINICAL_PROGRESSION: GRADUALLY IMPROVING

## 2020-02-22 ASSESSMENT — PAIN DESCRIPTION - ORIENTATION
ORIENTATION: RIGHT;LOWER
ORIENTATION: RIGHT;LOWER

## 2020-02-22 ASSESSMENT — PAIN DESCRIPTION - FREQUENCY: FREQUENCY: CONTINUOUS

## 2020-02-22 ASSESSMENT — PAIN DESCRIPTION - PAIN TYPE
TYPE: ACUTE PAIN

## 2020-02-22 ASSESSMENT — PAIN - FUNCTIONAL ASSESSMENT: PAIN_FUNCTIONAL_ASSESSMENT: PREVENTS OR INTERFERES SOME ACTIVE ACTIVITIES AND ADLS

## 2020-02-22 ASSESSMENT — PAIN DESCRIPTION - LOCATION
LOCATION: GROIN;HIP
LOCATION: GROIN;HIP
LOCATION: GROIN;HIP;BACK

## 2020-02-22 ASSESSMENT — PAIN DESCRIPTION - DESCRIPTORS: DESCRIPTORS: DULL;ACHING

## 2020-02-22 NOTE — PROGRESS NOTES
PRN Lukas Salvador MD        docusate sodium (COLACE) capsule 100 mg  100 mg Oral BID Lukas Salvador MD   100 mg at 02/22/20 4424    nystatin (MYCOSTATIN) ointment   Topical BID Lukas Salvador MD         Allergies   Allergen Reactions    Lodine [Etodolac] Hives    Penicillins      Active Problems:    Multiple fracture  Resolved Problems:    * No resolved hospital problems. *    Blood pressure (!) 138/55, pulse 69, temperature 98.1 °F (36.7 °C), temperature source Temporal, resp. rate 16, height 5' 2\" (1.575 m), weight 180 lb (81.6 kg), SpO2 92 %, not currently breastfeeding. Subjective:  Symptoms:  Stable. She reports weakness. Diet:  Adequate intake. Activity level: Impaired due to pain. Pain:  She complains of pain that is moderate. Objective:  Vital signs: (most recent): Blood pressure (!) 138/55, pulse 69, temperature 98.1 °F (36.7 °C), temperature source Temporal, resp. rate 16, height 5' 2\" (1.575 m), weight 180 lb (81.6 kg), SpO2 92 %, not currently breastfeeding. Vital signs are normal.    Output: Producing urine and producing stool. Lungs:  Normal effort and normal respiratory rate. Breath sounds clear to auscultation. Heart: Normal rate. Regular rhythm. S1 normal and S2 normal.    Abdomen: Abdomen is soft. Bowel sounds are normal.   There is no abdominal tenderness. Neurological: Patient is alert. (3/5 lowers). Assessment:    Condition: In stable condition. (Multiple fractures). Plan:   Up to wheel chair. (Tolerating therapy  Limited by pain and weakness  bowels and bladder are functioning).        Simeon Beatty MD  2/22/2020

## 2020-02-23 PROCEDURE — 6370000000 HC RX 637 (ALT 250 FOR IP): Performed by: INTERNAL MEDICINE

## 2020-02-23 PROCEDURE — 97110 THERAPEUTIC EXERCISES: CPT

## 2020-02-23 PROCEDURE — 97530 THERAPEUTIC ACTIVITIES: CPT

## 2020-02-23 PROCEDURE — 6360000002 HC RX W HCPCS: Performed by: INTERNAL MEDICINE

## 2020-02-23 PROCEDURE — 1280000000 HC REHAB R&B

## 2020-02-23 PROCEDURE — 6370000000 HC RX 637 (ALT 250 FOR IP): Performed by: PHYSICAL MEDICINE & REHABILITATION

## 2020-02-23 RX ADMIN — NYSTATIN AND TRIAMCINOLONE ACETONIDE: 100000; 1 CREAM TOPICAL at 20:31

## 2020-02-23 RX ADMIN — HYDROCODONE BITARTRATE AND ACETAMINOPHEN 1 TABLET: 5; 325 TABLET ORAL at 08:46

## 2020-02-23 RX ADMIN — SKIN PROTECTANT: 44 OINTMENT TOPICAL at 08:45

## 2020-02-23 RX ADMIN — NYSTATIN OINTMENT: 100000 OINTMENT TOPICAL at 20:31

## 2020-02-23 RX ADMIN — HYDROCODONE BITARTRATE AND ACETAMINOPHEN 1 TABLET: 5; 325 TABLET ORAL at 14:56

## 2020-02-23 RX ADMIN — FUROSEMIDE 40 MG: 40 TABLET ORAL at 08:45

## 2020-02-23 RX ADMIN — NYSTATIN OINTMENT: 100000 OINTMENT TOPICAL at 11:15

## 2020-02-23 RX ADMIN — ASPIRIN 81 MG 81 MG: 81 TABLET ORAL at 08:47

## 2020-02-23 RX ADMIN — Medication 250 MG: at 20:31

## 2020-02-23 RX ADMIN — FERROUS SULFATE TAB 325 MG (65 MG ELEMENTAL FE) 325 MG: 325 (65 FE) TAB at 08:47

## 2020-02-23 RX ADMIN — FERROUS SULFATE TAB 325 MG (65 MG ELEMENTAL FE) 325 MG: 325 (65 FE) TAB at 18:57

## 2020-02-23 RX ADMIN — Medication 2000 UNITS: at 08:46

## 2020-02-23 RX ADMIN — HEPARIN SODIUM 5000 UNITS: 10000 INJECTION INTRAVENOUS; SUBCUTANEOUS at 19:00

## 2020-02-23 RX ADMIN — CARVEDILOL 12.5 MG: 6.25 TABLET, FILM COATED ORAL at 08:45

## 2020-02-23 RX ADMIN — HEPARIN SODIUM 5000 UNITS: 10000 INJECTION INTRAVENOUS; SUBCUTANEOUS at 06:39

## 2020-02-23 RX ADMIN — DOCUSATE SODIUM 100 MG: 100 CAPSULE, LIQUID FILLED ORAL at 19:00

## 2020-02-23 RX ADMIN — ROSUVASTATIN CALCIUM 20 MG: 20 TABLET, FILM COATED ORAL at 20:36

## 2020-02-23 RX ADMIN — NYSTATIN AND TRIAMCINOLONE ACETONIDE: 100000; 1 CREAM TOPICAL at 11:08

## 2020-02-23 RX ADMIN — CARVEDILOL 6.25 MG: 6.25 TABLET, FILM COATED ORAL at 18:58

## 2020-02-23 RX ADMIN — CLOPIDOGREL 75 MG: 75 TABLET, FILM COATED ORAL at 08:47

## 2020-02-23 RX ADMIN — DOCUSATE SODIUM 100 MG: 100 CAPSULE, LIQUID FILLED ORAL at 08:46

## 2020-02-23 ASSESSMENT — PAIN SCALES - GENERAL
PAINLEVEL_OUTOF10: 0
PAINLEVEL_OUTOF10: 7
PAINLEVEL_OUTOF10: 6
PAINLEVEL_OUTOF10: 4

## 2020-02-23 ASSESSMENT — PAIN DESCRIPTION - FREQUENCY
FREQUENCY: CONTINUOUS
FREQUENCY: CONTINUOUS

## 2020-02-23 ASSESSMENT — PAIN DESCRIPTION - LOCATION
LOCATION: BACK;HIP;GROIN
LOCATION: BACK;HIP;GROIN

## 2020-02-23 ASSESSMENT — PAIN DESCRIPTION - PAIN TYPE
TYPE: ACUTE PAIN
TYPE: ACUTE PAIN

## 2020-02-23 ASSESSMENT — PAIN DESCRIPTION - DESCRIPTORS
DESCRIPTORS: ACHING
DESCRIPTORS: ACHING

## 2020-02-23 ASSESSMENT — PAIN DESCRIPTION - PROGRESSION: CLINICAL_PROGRESSION: GRADUALLY IMPROVING

## 2020-02-23 ASSESSMENT — PAIN - FUNCTIONAL ASSESSMENT
PAIN_FUNCTIONAL_ASSESSMENT: PREVENTS OR INTERFERES SOME ACTIVE ACTIVITIES AND ADLS
PAIN_FUNCTIONAL_ASSESSMENT: PREVENTS OR INTERFERES SOME ACTIVE ACTIVITIES AND ADLS

## 2020-02-23 ASSESSMENT — PAIN DESCRIPTION - ONSET
ONSET: PROGRESSIVE
ONSET: PROGRESSIVE

## 2020-02-23 NOTE — PROGRESS NOTES
Occupational Therapy  OCCUPATIONAL THERAPY DAILY NOTE    Date:2020  Patient Name: Riaz Royal  MRN: [id number]  : 1940  Room: [facility]A     Diagnosis: Pelvic fx- superior & inferior pubic rami fx- R sacral alar fx & subacute fx R L5  Additional Pertinent Hx: OA, HTN, HLD, CKD, CAD, anemia, chronic back pain, pain pump, hx L2 compression fx- stable  Precautions: falls,WBAT RLE, Spinal Precautions     Functional Assessment:   Date Status AE  Comments   Feeding 20  Mod I      Grooming 20  Minimal Assist      Bathing 20 Maximal Assist      UB Dressing 20  Supervision      LB Dressing 20  Maximal Assist   Pt donned left sock with sock aide at [address] 20  TBA         Functional Transfers / Balance:   Date Status DME  Comments   Sit Balance 20  Supervision      Stand Balance 20  Mod A rw Pt had difficulty standing with rw & only able to stand 20 seconds due to pain   [] Tub  [] Shower   Transfer 20  TBA      Commode   Transfer 20  Maximal Assist      Functional   Mobility 20   Maximal Assist  ww A few steps due to level of pain    Other:         Functional Exercises / Activity:   BUE strength exercises with 2# dowel ann & using shoulder ladder- performing 20 reps. Pt tolerated arm bike 5 minutes with min resistance. Pt tolerated FMC/dexterity & endurance training using blue theraputty & then was able to locate 20 pennies. Pt working on improving strength & endurance to improve pt abilty to engage in functional tasks & improve pt independence. Sensory / Neuromuscular Re-Education:      Cognitive Skills:   Status Comments   Problem   Solving good     Memory good     Sequencing good     Safety fair       Visual Perception:    Education:  Pt was educated on AE for LB dressing     [] Family teach completed on:    Pain Level: 8/10 when standing in her BLE    Additional Notes:   2020- Pt complained that she is not sleeping well.  When pt questioned further pt stated she normally sleeps in a recliner @ home as she is unable to get in or out of bed prior to her admission. Patient has made good  progress during treatment sessions toward set goals. Therapy emphasis to obtain goals: Current Treatment Recommendations: Pain Management, Positioning, Gait Training, Safety Education & Training, Balance Training, Patient/Caregiver Education & Training, Self-Care / ADL, Functional Mobility Training, Equipment Evaluation, Education, & procurement, Home Management Training, Endurance Training     [x] Continue with current OT Plan of care.   [] Prepare for Discharge     DISCHARGE RECOMMENDATIONS  Recommended DME:  Wc, rw, slide board, drop arm commode, tub bench & hospital bed    Post Discharge Care:   []Home Independently  []Home with 24hr Care / Supervision []Home with Partial Supervision []Home with Home Health OT []Home with Out Pt OT []Other: ___   Comments:         Time in Time out Tx Time Breakdown  Variance:   First Session  [id number] [x] Individual Tx- 30   [] Concurrent Tx -  [] Co-Tx -   [] Group Tx -   [] Time Missed -     Second Session   [] Individual Tx-  [] Concurrent Tx -  [] Co-Tx -   [] Group Tx -   [] Time Missed -     Third Session    [] Individual Tx-   [] Concurrent Tx -  [] Co-Tx -   [] Group Tx -   [] Time Missed -         Total Tx Time: 30 mins    Manda Day OTR/L [id number]

## 2020-02-23 NOTE — PROGRESS NOTES
_C__Church  ___Volunteer ___Club/Organization                                                     ___Other: ___________    Sports/Exercises:  ___Walking  ___Football  ___Basketball     ___Golf  ___Baseball  ___Tennis       ___Bowling  ___Other: ___________      Traveling:   _P__Global  _P__Stateside  ___Local       ___Other: ___________      TV/Radio:   _C__Mysteries  ___Comedies ___Romance                                                     ___Game show       ___Sports       ___News                                                      ___Talk show          ___Other: ___________      OtherDaonra Hamilton identified feelings of being frustrated and sad. Personal Leisure Profile:   Question Response   Attributes Identify a positive quality: []Ambitious  []Appreciative  []Caring  []Courteous  []Considerate  []Compassionate  []Creative  []Dependable   []Generous  []Honest  []Hard working  []Helpful  []Kind  [][place]   [][place]  [][place]  []Patient  []Polite  []Respectful  []Sociable  []Sense of humor  []Thoughtful  [x]Other: ___Faith________    You are very good at [facility]   Awareness Why do you participate in your leisure interest: []Competition  []Creativity  []Concentration  []Exercise  []Enjoyment  []Fun  []Hand/eye Coordination  []Increase confidence  []Learning a new skill  []Relaxation  []Social Interaction  []Supporting one another  [x]Thinking  []Other: ___________    Are your leisure activities limited at this time? [x]Yes  []No  Comments: _________    How often do you participate in your leisure interest? Everyday   Resources Name a restaurant, store or business you frequent? [employer]   Personal When are you most happy?  Around family     Barriers to Leisure Participation:  []Visual   [][place]  []Cognition/Communication  []Motivation  []Financial  []Transportation  []Time Constraints  [x]Physical Ability  []Leisure Awareness  []Drug/Alcohol  []Other:

## 2020-02-24 PROCEDURE — 97112 NEUROMUSCULAR REEDUCATION: CPT

## 2020-02-24 PROCEDURE — 97530 THERAPEUTIC ACTIVITIES: CPT

## 2020-02-24 PROCEDURE — 1280000000 HC REHAB R&B

## 2020-02-24 PROCEDURE — 6370000000 HC RX 637 (ALT 250 FOR IP): Performed by: PHYSICAL MEDICINE & REHABILITATION

## 2020-02-24 PROCEDURE — 6360000002 HC RX W HCPCS: Performed by: INTERNAL MEDICINE

## 2020-02-24 PROCEDURE — 6370000000 HC RX 637 (ALT 250 FOR IP): Performed by: INTERNAL MEDICINE

## 2020-02-24 PROCEDURE — 97110 THERAPEUTIC EXERCISES: CPT

## 2020-02-24 RX ADMIN — FERROUS SULFATE TAB 325 MG (65 MG ELEMENTAL FE) 325 MG: 325 (65 FE) TAB at 17:01

## 2020-02-24 RX ADMIN — FUROSEMIDE 40 MG: 40 TABLET ORAL at 08:24

## 2020-02-24 RX ADMIN — HYDROCODONE BITARTRATE AND ACETAMINOPHEN 1 TABLET: 5; 325 TABLET ORAL at 14:41

## 2020-02-24 RX ADMIN — Medication 250 MG: at 21:06

## 2020-02-24 RX ADMIN — NYSTATIN OINTMENT: 100000 OINTMENT TOPICAL at 08:25

## 2020-02-24 RX ADMIN — HEPARIN SODIUM 5000 UNITS: 10000 INJECTION INTRAVENOUS; SUBCUTANEOUS at 06:11

## 2020-02-24 RX ADMIN — FERROUS SULFATE TAB 325 MG (65 MG ELEMENTAL FE) 325 MG: 325 (65 FE) TAB at 08:30

## 2020-02-24 RX ADMIN — NYSTATIN OINTMENT: 100000 OINTMENT TOPICAL at 21:05

## 2020-02-24 RX ADMIN — CLOPIDOGREL 75 MG: 75 TABLET, FILM COATED ORAL at 08:25

## 2020-02-24 RX ADMIN — DOCUSATE SODIUM 100 MG: 100 CAPSULE, LIQUID FILLED ORAL at 21:06

## 2020-02-24 RX ADMIN — CARVEDILOL 12.5 MG: 6.25 TABLET, FILM COATED ORAL at 08:24

## 2020-02-24 RX ADMIN — ROSUVASTATIN CALCIUM 20 MG: 20 TABLET, FILM COATED ORAL at 21:06

## 2020-02-24 RX ADMIN — NYSTATIN AND TRIAMCINOLONE ACETONIDE: 100000; 1 CREAM TOPICAL at 08:23

## 2020-02-24 RX ADMIN — HEPARIN SODIUM 5000 UNITS: 10000 INJECTION INTRAVENOUS; SUBCUTANEOUS at 17:01

## 2020-02-24 RX ADMIN — Medication 2000 UNITS: at 08:23

## 2020-02-24 RX ADMIN — DOCUSATE SODIUM 100 MG: 100 CAPSULE, LIQUID FILLED ORAL at 08:24

## 2020-02-24 RX ADMIN — ASPIRIN 81 MG 81 MG: 81 TABLET ORAL at 08:24

## 2020-02-24 RX ADMIN — HYDROCODONE BITARTRATE AND ACETAMINOPHEN 1 TABLET: 5; 325 TABLET ORAL at 08:24

## 2020-02-24 RX ADMIN — HYDROCODONE BITARTRATE AND ACETAMINOPHEN 1 TABLET: 5; 325 TABLET ORAL at 21:06

## 2020-02-24 RX ADMIN — CARVEDILOL 6.25 MG: 6.25 TABLET, FILM COATED ORAL at 17:01

## 2020-02-24 RX ADMIN — SKIN PROTECTANT: 44 OINTMENT TOPICAL at 08:28

## 2020-02-24 RX ADMIN — NYSTATIN AND TRIAMCINOLONE ACETONIDE: 100000; 1 CREAM TOPICAL at 21:05

## 2020-02-24 ASSESSMENT — PAIN DESCRIPTION - ORIENTATION
ORIENTATION: RIGHT

## 2020-02-24 ASSESSMENT — PAIN DESCRIPTION - FREQUENCY
FREQUENCY: CONTINUOUS

## 2020-02-24 ASSESSMENT — PAIN SCALES - GENERAL
PAINLEVEL_OUTOF10: 6
PAINLEVEL_OUTOF10: 8
PAINLEVEL_OUTOF10: 0
PAINLEVEL_OUTOF10: 0
PAINLEVEL_OUTOF10: 8
PAINLEVEL_OUTOF10: 6

## 2020-02-24 ASSESSMENT — PAIN DESCRIPTION - PAIN TYPE
TYPE: ACUTE PAIN

## 2020-02-24 ASSESSMENT — PAIN DESCRIPTION - DESCRIPTORS
DESCRIPTORS: ACHING

## 2020-02-24 ASSESSMENT — PAIN DESCRIPTION - LOCATION
LOCATION: GROIN
LOCATION: GROIN
LOCATION: GROIN;HIP;BACK

## 2020-02-24 NOTE — PLAN OF CARE
Problem: Falls - Risk of:  Goal: Will remain free from falls  Description  Will remain free from falls  Outcome: Met This Shift     Problem: Falls - Risk of:  Goal: Absence of physical injury  Description  Absence of physical injury  Outcome: Met This Shift     Problem: Pain:  Goal: Pain level will decrease  Description  Pain level will decrease  Outcome: Ongoing

## 2020-02-24 NOTE — PROGRESS NOTES
02/24/20 1825   Attendance   Activity Puzzles  (Wordsearch)   Participation Active participation   Therapeutic Recreation   Community Reintegration Demonstrates ability to complete social goals   Leisure Education Demonstrates knowledge of benefits of leisure involvement  Isrrael Rios identified fun and something I love to do as benefits.)   Leisure Attitude/Participation Participates in 1:1 structured activity   Time Spent With Patient   Minutes 25

## 2020-02-24 NOTE — PROGRESS NOTES
with bilateral UE sup  >150' with bilateral UE mod I   Car Transfers NT  NT sup Mod I   Stair negotiation: ascended and descended NT 1 step with bilateral HR min/modA (NR patterning, descending retrograde)  4 steps with bilateral rail with Julissa >4 steps with one rail with sba   Curb Step:   ascended and descended NT  2 in curb step with ww Julissa/modA 4 inch step with AAD and Julissa 4 inch step with AAD and sba   Picking up object off the floor NT       BLE ROM WFL (gross knee flexion/extension limitations due to OA/audible crepitus noted)       BLE Strength R LE: hip 3-/5, knee 3+/5, ankle 4/5    L LE: hip 3+/5, knee 4/5, ankle 4/5       Balance  Sitting: Independent   Standing: min/modA with ww       Date Family Teach Completed TBA       Is additional Family Teaching Needed? Y or N Y       Hindering Progress Pain, OA       PT recommended ELOS 3 weeks       Team's Discharge Plan        Therapist at Team Meeting          Therapeutic Exercise:   AM: 3x STS transfer at w/c, initial cues for hand placement on arm rests to facilitate COM over DEEPAK   Ambulation x2 reps  Seated LAQ, Hip flexion, AP at EOB 2x15 to increase LE strength and ROM for transfers    PM: 3x STS transfer at w/c, initial cues for hand placement on arm rests to facilitate COM over DEEPAK   Seated LAQ, hip flexion, AP 2x20  Curb step with ww, cues for patterning/technique     Patient education  Pt educated on hand placement with transfers, WBAT patterning with all mobility, UE use to facilitate increased step height/toe clearance    Patient response to education:   Pt verbalized understanding Pt demonstrated skill Pt requires further education in this area   yes Yes with vcA yes     Additional Comments: Reviewed all mobility as per above. Initially c/o severe pain with R LE weight bearing with antalgic patterning, however progressing to step through patterning with ambulation and reduced pain with activity.  Improving toe clearance and step height noted, with

## 2020-02-25 PROCEDURE — 6370000000 HC RX 637 (ALT 250 FOR IP): Performed by: PHYSICAL MEDICINE & REHABILITATION

## 2020-02-25 PROCEDURE — 97110 THERAPEUTIC EXERCISES: CPT

## 2020-02-25 PROCEDURE — 1280000000 HC REHAB R&B

## 2020-02-25 PROCEDURE — 97530 THERAPEUTIC ACTIVITIES: CPT

## 2020-02-25 PROCEDURE — 6360000002 HC RX W HCPCS: Performed by: INTERNAL MEDICINE

## 2020-02-25 PROCEDURE — 6370000000 HC RX 637 (ALT 250 FOR IP): Performed by: INTERNAL MEDICINE

## 2020-02-25 RX ADMIN — HYDROCODONE BITARTRATE AND ACETAMINOPHEN 1 TABLET: 5; 325 TABLET ORAL at 06:51

## 2020-02-25 RX ADMIN — CARVEDILOL 6.25 MG: 6.25 TABLET, FILM COATED ORAL at 17:05

## 2020-02-25 RX ADMIN — SKIN PROTECTANT: 44 OINTMENT TOPICAL at 08:54

## 2020-02-25 RX ADMIN — ASPIRIN 81 MG 81 MG: 81 TABLET ORAL at 08:51

## 2020-02-25 RX ADMIN — NYSTATIN AND TRIAMCINOLONE ACETONIDE: 100000; 1 CREAM TOPICAL at 21:08

## 2020-02-25 RX ADMIN — Medication 250 MG: at 21:01

## 2020-02-25 RX ADMIN — DOCUSATE SODIUM 100 MG: 100 CAPSULE, LIQUID FILLED ORAL at 21:01

## 2020-02-25 RX ADMIN — CLOPIDOGREL 75 MG: 75 TABLET, FILM COATED ORAL at 08:52

## 2020-02-25 RX ADMIN — Medication 2000 UNITS: at 08:51

## 2020-02-25 RX ADMIN — NYSTATIN OINTMENT: 100000 OINTMENT TOPICAL at 08:50

## 2020-02-25 RX ADMIN — CARVEDILOL 12.5 MG: 6.25 TABLET, FILM COATED ORAL at 08:51

## 2020-02-25 RX ADMIN — ROSUVASTATIN CALCIUM 20 MG: 20 TABLET, FILM COATED ORAL at 21:01

## 2020-02-25 RX ADMIN — FUROSEMIDE 40 MG: 40 TABLET ORAL at 08:52

## 2020-02-25 RX ADMIN — HEPARIN SODIUM 5000 UNITS: 10000 INJECTION INTRAVENOUS; SUBCUTANEOUS at 17:04

## 2020-02-25 RX ADMIN — NYSTATIN AND TRIAMCINOLONE ACETONIDE: 100000; 1 CREAM TOPICAL at 08:51

## 2020-02-25 RX ADMIN — FERROUS SULFATE TAB 325 MG (65 MG ELEMENTAL FE) 325 MG: 325 (65 FE) TAB at 08:51

## 2020-02-25 RX ADMIN — HYDROCODONE BITARTRATE AND ACETAMINOPHEN 1 TABLET: 5; 325 TABLET ORAL at 12:51

## 2020-02-25 RX ADMIN — DOCUSATE SODIUM 100 MG: 100 CAPSULE, LIQUID FILLED ORAL at 08:51

## 2020-02-25 RX ADMIN — HEPARIN SODIUM 5000 UNITS: 10000 INJECTION INTRAVENOUS; SUBCUTANEOUS at 06:50

## 2020-02-25 RX ADMIN — FERROUS SULFATE TAB 325 MG (65 MG ELEMENTAL FE) 325 MG: 325 (65 FE) TAB at 17:05

## 2020-02-25 RX ADMIN — HYDROCODONE BITARTRATE AND ACETAMINOPHEN 1 TABLET: 5; 325 TABLET ORAL at 20:05

## 2020-02-25 ASSESSMENT — PAIN DESCRIPTION - PAIN TYPE
TYPE: ACUTE PAIN
TYPE: ACUTE PAIN

## 2020-02-25 ASSESSMENT — PAIN DESCRIPTION - ORIENTATION
ORIENTATION: RIGHT

## 2020-02-25 ASSESSMENT — PAIN SCALES - GENERAL
PAINLEVEL_OUTOF10: 4
PAINLEVEL_OUTOF10: 6
PAINLEVEL_OUTOF10: 9
PAINLEVEL_OUTOF10: 7
PAINLEVEL_OUTOF10: 7
PAINLEVEL_OUTOF10: 6
PAINLEVEL_OUTOF10: 0

## 2020-02-25 ASSESSMENT — PAIN - FUNCTIONAL ASSESSMENT: PAIN_FUNCTIONAL_ASSESSMENT: PREVENTS OR INTERFERES SOME ACTIVE ACTIVITIES AND ADLS

## 2020-02-25 ASSESSMENT — PAIN DESCRIPTION - DESCRIPTORS
DESCRIPTORS: ACHING
DESCRIPTORS: ACHING;DISCOMFORT
DESCRIPTORS: ACHING

## 2020-02-25 ASSESSMENT — PAIN DESCRIPTION - LOCATION
LOCATION: BACK;HIP

## 2020-02-25 ASSESSMENT — PAIN DESCRIPTION - PROGRESSION
CLINICAL_PROGRESSION: NOT CHANGED
CLINICAL_PROGRESSION: NOT CHANGED

## 2020-02-25 ASSESSMENT — PAIN DESCRIPTION - FREQUENCY
FREQUENCY: CONTINUOUS
FREQUENCY: CONTINUOUS

## 2020-02-25 ASSESSMENT — PAIN DESCRIPTION - ONSET
ONSET: ON-GOING
ONSET: ON-GOING

## 2020-02-25 NOTE — PROGRESS NOTES
Occupational Therapy  OCCUPATIONAL THERAPY DAILY NOTE    Date:2020  Patient Name: Denise Kruse  MRN: [id number]  : 1940  Room: [address] [place] [address]-A     Diagnosis: Pelvic fx- superior & inferior pubic rami fx- R sacral alar fx & subacute fx R L5  Additional Pertinent Hx: OA, HTN, HLD, CKD, CAD, anemia, chronic back pain, pain pump, hx L2 compression fx- stable  Precautions: falls,WBAT RLE, Spinal Precautions     Functional Assessment:   Date Status AE  Comments   Feeding 20  Mod I      Grooming 20  Minimal Assist      Bathing 20 Maximal Assist      UB Dressing 20  Supervision      LB Dressing 20  Maximal Assist       Homemaking 20 Set up ( seated)       Functional Transfers / Balance:   Date Status DME  Comments   Sit Balance 20  Supervision      Stand Balance 20  Mod A rw    [] Tub  [] Shower   Transfer 20  TBA      Commode   Transfer 20  Maximal Assist  Ww, BSC     Functional   Mobility 20   Maximal Assist  ww    Other:sit to stand       Stand pivot     On/off recliner  20  Mod A       Mod A     Max A  ww      ww    ww            Assist with sit to stand from lower chair surface      Functional Exercises / Activity:  BUE ROM /strength exercises: robert box with 5 # wt on table top surface 3 sets 15 reps in all planes                                                       Small yellow can do bar 3 sets 15 reps   B hand strength:  graded clothespin activity                               3 # digi flex 3 sets 15 reps                                             Sensory / Neuromuscular Re-Education:      Cognitive Skills:   Status Comments   Problem   Solving good     Memory good     Sequencing good     Safety fair       Visual Perception:    Education:   pt educated on safe functional transfers using ww     [] Family teach completed on:    Pain Level: 8/10 when standing in her BLE    Additional Notes:   2020- Pt complained that she is not sleeping well. When pt questioned further pt stated she normally sleeps in a recliner @ home as she is unable to get in or out of bed prior to her admission. Patient has made good  progress during treatment sessions toward set goals. Therapy emphasis to obtain goals: Current Treatment Recommendations: Pain Management, Positioning, Gait Training, Safety Education & Training, Balance Training, Patient/Caregiver Education & Training, Self-Care / ADL, Functional Mobility Training, Equipment Evaluation, Education, & procurement, Home Management Training, Endurance Training     [x] Continue with current OT Plan of care.   [] Prepare for Discharge     DISCHARGE RECOMMENDATIONS  Recommended DME:  Wc, rw, slide board, drop arm commode, tub bench & hospital bed    Post Discharge Care:   []Home Independently  []Home with 24hr Care / Supervision []Home with Partial Supervision []Home with Home Health OT []Home with Out Pt OT []Other: ___   Comments:         Time in Time out Tx Time Breakdown  Variance:   First Session  [id number]  [x] Individual Tx-45    [] Concurrent Tx   [] Co-Tx -   [] Group Tx -   [] Time Missed -     Second Session 0620 0741  [] Individual Tx-  [x] Concurrent Tx -45  [] Co-Tx -   [] Group Tx -   [] Time Missed -     Third Session    [] Individual Tx-   [] Concurrent Tx -  [] Co-Tx -   [] Group Tx -   [] Time Missed -         Total Tx Time: 90 mins    Willis Rivas OTR/L [id number]

## 2020-02-25 NOTE — PROGRESS NOTES
Chair Mobility 76' with bilateral UE sba    50 with bilateral UE sup  >150' with bilateral UE mod I   Car Transfers NT  NT sup Mod I   Stair negotiation: ascended and descended NT 2 steps with bilateral HR Julissa (NR patterning, descending retrograde)  4 steps with bilateral rail with Julissa >4 steps with one rail with sba   Curb Step:   ascended and descended NT  2 in curb step with ww minAA 4 inch step with AAD and Julissa 4 inch step with AAD and sba   Picking up object off the floor NT       BLE ROM WFL (gross knee flexion/extension limitations due to OA/audible crepitus noted)       BLE Strength R LE: hip 3-/5, knee 3+/5, ankle 4/5    L LE: hip 3+/5, knee 4/5, ankle 4/5       Balance  Sitting: Independent   Standing: min/modA with ww       Date Family Teach Completed TBA       Is additional Family Teaching Needed? Y or N Y       Hindering Progress Pain, OA       PT recommended ELOS 3 weeks       Team's Discharge Plan        Therapist at Team Meeting          Therapeutic Exercise:   AM: 2x and 3x STS transfer at w/c, initial cues for hand placement on arm rests to facilitate COM over DEEPAK   Ambulation x1 reps  Seated LAQ, Hip flexion, AP at EOB 2x15 to increase LE strength and ROM for transfers  Stair negotiation, descending backwards due to limited endurance    PM: 3x STS transfer at w/c, initial cues for hand placement on arm rests to facilitate COM over DEEPAK   Curb step with ww, cues for patterning/technique     Patient education  Pt educated on hand placement with transfers, WBAT patterning with all mobility, UE use to facilitate increased step height/toe clearance    Patient response to education:   Pt verbalized understanding Pt demonstrated skill Pt requires further education in this area   yes Yes with vcA yes     Additional Comments: Reviewed all mobility as per above. Encouragement provided t/o session, frequently reporting, \"I can't do it\" before attempting the task.  Step through patterning more consistently occurring during ambulation. Verbal cues for upright posture required for ambulation/stair negotiation to increase UE use and reduce risk of falls. In PM, continues to be limited by c/o pain, particularly with R LE weight bearing t/o session. Increased time required to complete most tasks. Antalgic patterning initially progressing to step through patterning with ambulation. Will continue to progress as able. AM  Time in: 0915  Time out: 1000    PM  Time in: 1300  Time out: 1345    Pt is making fair progress toward established Physical Therapy goals. Continue with physical therapy current plan of care.     Desmond Jacobs, HEIDYT  [id number]

## 2020-02-25 NOTE — PROGRESS NOTES
Progress Note    Subjective/   [de-identified]y.o. year old female on the rehab unit for multiple fracture. Tolerating therapy program. No new issues. Objective/   VITALS:  BP (!) 150/61   Pulse 72   Temp 98.3 °F (36.8 °C) (Temporal)   Resp 18   Ht 5' 2\" (1.575 m)   Wt 180 lb (81.6 kg)   SpO2 95%   BMI 32.92 kg/m²   24HR INTAKE/OUTPUT:      Intake/Output Summary (Last 24 hours) at 2/24/2020 2214  Last data filed at 2/24/2020 2150  Gross per 24 hour   Intake 540 ml   Output 1500 ml   Net -960 ml     Constitutional:  Alert, awake, no apparent distress   Cardiovascular:  S1, S2 without m/r/g   Respiratory:  CTA B without w/r/r   Abdomen: +BS  Ext: no pitting LE edema  Neuro: awake and alert, no tremor    Functional Level    Initial Evaluation  2/21/20 AM PM Short Term Goals Long Term Goals    Was pt agreeable to Eval/treatment? yes yes yes       Does pt have pain?  3/10 right groin at rest  8/10 right groin with activty  3/10 right groin at rest  8/10 right groin with activty 3/10 right groin at rest  8/10 right groin with activty       Bed Mobility  Rolling: Julissa  Supine to sit: Julissa  Sit to supine: modA  Scooting: Julissa     sup Mod I   Transfers Sit to stand: min/modA  Stand to sit: min/modA  Stand pivot: min/modA with ww Sit to stand: cg/Julissa  Stand to sit: cg/Julissa  Stand pivot: cg/Julissa with ww Sit to stand: cgA  Stand to sit: cgA  Stand pivot: cg/Julissa with ww sba with AAD Mod I with AAD   Ambulation    8 feet with ww with Julissa 10' and 20' with ww with cg/sba 14 and 20 feet with ww with cg/sba 50 feet with AAD with sba >150 feet with AAD with mod I   Walking 10 feet on uneven surface NT           Wheel Chair Mobility 76' with bilateral UE sba      50 with bilateral UE sup  >150' with bilateral UE mod I   Car Transfers NT   NT sup Mod I   Stair negotiation: ascended and descended NT 1 step with bilateral HR min/modA (NR patterning, descending retrograde)   4 steps with bilateral rail with Julissa >4 steps with one rail with sba   Curb Step:   ascended and descended NT   2 in curb step with ww Julissa/modA 4 inch step with AAD and Julissa 4 inch step with AAD and sba   Picking up object off the floor NT           BLE ROM WFL (gross knee flexion/extension limitations due to OA/audible crepitus noted)           BLE Strength R LE: hip 3-/5, knee 3+/5, ankle 4/5     L LE: hip 3+/5, knee 4/5, ankle 4/5           Balance  Sitting: Independent   Standing: min/modA with ww                 Scheduled Meds:   sodium chloride flush  10 mL Intravenous 2 times per day    aspirin  81 mg Oral Daily    carvedilol  12.5 mg Oral QAM    carvedilol  6.25 mg Oral Nightly    clopidogrel  75 mg Oral Daily    ferrous sulfate  325 mg Oral BID    furosemide  40 mg Oral Daily    heparin (porcine)  5,000 Units Subcutaneous Q12H    magnesium gluconate  250 mg Oral Nightly    mineral oil-hydrophilic petrolatum   Topical Daily    nystatin-triamcinolone   Topical BID    rosuvastatin  20 mg Oral Daily    vitamin D  2,000 Units Oral Daily    docusate sodium  100 mg Oral BID    nystatin   Topical BID     Continuous Infusions:  PRN Meds:magnesium hydroxide, sodium chloride flush, HYDROcodone-acetaminophen, polyethylene glycol  I/O last 3 completed shifts: In: 80 [P.O.:780]  Out: 650 [Urine:650]  I/O this shift:  In: 180 [P.O.:180]  Out: 850 [Urine:850]    Labs reviewed  CBC: No results for input(s): WBC, HGB, PLT in the last 72 hours. BMP:  No results for input(s): NA, K, CL, CO2, BUN, CREATININE, GLUCOSE in the last 72 hours. Hepatic: No results for input(s): AST, ALT, ALB, BILITOT, ALKPHOS in the last 72 hours. BNP: No results for input(s): BNP in the last 72 hours. Lipids: No results for input(s): CHOL, HDL in the last 72 hours. Invalid input(s): LDLCALCU  INR: No results for input(s): INR in the last 72 hours.     Assessment/  Patient Active Problem List:     Fall due to stumbling     Anemia     HTN (hypertension)     OA (osteoarthritis)     Spinal stenosis     Osteoporosis     Chronic pain     Facial contusion     Compression fx, lumbar spine ([facility])     Compression fx, thoracic spine ([facility])     Valvular heart disease     Tachycardia     Closed fracture of nasal bone     Acute kidney injury ([facility] [address]     Acute renal failure (ARF) ([facility])     Closed fracture of sacrum, initial encounter ([facility])     DDD (degenerative disc disease), lumbar     Hypoalbuminemia     Unable to ambulate     Physical deconditioning     CKD (chronic kidney disease)     History of aortic valve replacement     Multiple fracture      Plan/  1. Continue rehab program  2. Continue current medications  3. Increase activity as able  4. Continue dvt prophylaxis  5.  Continue pain control          Wesley Guerrier MD

## 2020-02-26 PROCEDURE — 97530 THERAPEUTIC ACTIVITIES: CPT

## 2020-02-26 PROCEDURE — 97535 SELF CARE MNGMENT TRAINING: CPT

## 2020-02-26 PROCEDURE — 6360000002 HC RX W HCPCS: Performed by: INTERNAL MEDICINE

## 2020-02-26 PROCEDURE — 97110 THERAPEUTIC EXERCISES: CPT

## 2020-02-26 PROCEDURE — 6370000000 HC RX 637 (ALT 250 FOR IP): Performed by: PHYSICAL MEDICINE & REHABILITATION

## 2020-02-26 PROCEDURE — 1280000000 HC REHAB R&B

## 2020-02-26 PROCEDURE — 6370000000 HC RX 637 (ALT 250 FOR IP): Performed by: INTERNAL MEDICINE

## 2020-02-26 RX ORDER — HYDROCODONE BITARTRATE AND ACETAMINOPHEN 5; 325 MG/1; MG/1
1 TABLET ORAL EVERY 4 HOURS PRN
Status: DISCONTINUED | OUTPATIENT
Start: 2020-02-26 | End: 2020-03-11 | Stop reason: HOSPADM

## 2020-02-26 RX ADMIN — ASPIRIN 81 MG 81 MG: 81 TABLET ORAL at 08:10

## 2020-02-26 RX ADMIN — Medication 250 MG: at 19:58

## 2020-02-26 RX ADMIN — HYDROCODONE BITARTRATE AND ACETAMINOPHEN 1 TABLET: 5; 325 TABLET ORAL at 17:24

## 2020-02-26 RX ADMIN — Medication 2000 UNITS: at 08:05

## 2020-02-26 RX ADMIN — NYSTATIN AND TRIAMCINOLONE ACETONIDE: 100000; 1 CREAM TOPICAL at 08:09

## 2020-02-26 RX ADMIN — NYSTATIN OINTMENT: 100000 OINTMENT TOPICAL at 08:05

## 2020-02-26 RX ADMIN — CARVEDILOL 6.25 MG: 6.25 TABLET, FILM COATED ORAL at 17:16

## 2020-02-26 RX ADMIN — FERROUS SULFATE TAB 325 MG (65 MG ELEMENTAL FE) 325 MG: 325 (65 FE) TAB at 17:16

## 2020-02-26 RX ADMIN — HYDROCODONE BITARTRATE AND ACETAMINOPHEN 1 TABLET: 5; 325 TABLET ORAL at 12:56

## 2020-02-26 RX ADMIN — HYDROCODONE BITARTRATE AND ACETAMINOPHEN 1 TABLET: 5; 325 TABLET ORAL at 21:39

## 2020-02-26 RX ADMIN — FERROUS SULFATE TAB 325 MG (65 MG ELEMENTAL FE) 325 MG: 325 (65 FE) TAB at 08:05

## 2020-02-26 RX ADMIN — SKIN PROTECTANT: 44 OINTMENT TOPICAL at 08:08

## 2020-02-26 RX ADMIN — HYDROCODONE BITARTRATE AND ACETAMINOPHEN 1 TABLET: 5; 325 TABLET ORAL at 06:06

## 2020-02-26 RX ADMIN — DOCUSATE SODIUM 100 MG: 100 CAPSULE, LIQUID FILLED ORAL at 08:04

## 2020-02-26 RX ADMIN — FUROSEMIDE 40 MG: 40 TABLET ORAL at 08:06

## 2020-02-26 RX ADMIN — HEPARIN SODIUM 5000 UNITS: 10000 INJECTION INTRAVENOUS; SUBCUTANEOUS at 17:17

## 2020-02-26 RX ADMIN — NYSTATIN OINTMENT: 100000 OINTMENT TOPICAL at 19:57

## 2020-02-26 RX ADMIN — CARVEDILOL 12.5 MG: 6.25 TABLET, FILM COATED ORAL at 08:04

## 2020-02-26 RX ADMIN — NYSTATIN AND TRIAMCINOLONE ACETONIDE: 100000; 1 CREAM TOPICAL at 19:57

## 2020-02-26 RX ADMIN — HEPARIN SODIUM 5000 UNITS: 10000 INJECTION INTRAVENOUS; SUBCUTANEOUS at 06:06

## 2020-02-26 RX ADMIN — POLYETHYLENE GLYCOL 3350 17 G: 17 POWDER, FOR SOLUTION ORAL at 08:03

## 2020-02-26 RX ADMIN — DOCUSATE SODIUM 100 MG: 100 CAPSULE, LIQUID FILLED ORAL at 19:57

## 2020-02-26 RX ADMIN — CLOPIDOGREL 75 MG: 75 TABLET, FILM COATED ORAL at 08:04

## 2020-02-26 RX ADMIN — ROSUVASTATIN CALCIUM 20 MG: 20 TABLET, FILM COATED ORAL at 19:58

## 2020-02-26 ASSESSMENT — PAIN DESCRIPTION - PAIN TYPE
TYPE: ACUTE PAIN

## 2020-02-26 ASSESSMENT — PAIN DESCRIPTION - LOCATION
LOCATION: BACK;HIP;GROIN
LOCATION: BACK;HIP
LOCATION: BACK

## 2020-02-26 ASSESSMENT — PAIN DESCRIPTION - ONSET
ONSET: ON-GOING

## 2020-02-26 ASSESSMENT — PAIN SCALES - GENERAL
PAINLEVEL_OUTOF10: 7
PAINLEVEL_OUTOF10: 5
PAINLEVEL_OUTOF10: 0
PAINLEVEL_OUTOF10: 9
PAINLEVEL_OUTOF10: 0
PAINLEVEL_OUTOF10: 8
PAINLEVEL_OUTOF10: 7
PAINLEVEL_OUTOF10: 6

## 2020-02-26 ASSESSMENT — PAIN DESCRIPTION - PROGRESSION
CLINICAL_PROGRESSION: NOT CHANGED

## 2020-02-26 ASSESSMENT — PAIN DESCRIPTION - ORIENTATION
ORIENTATION: RIGHT

## 2020-02-26 ASSESSMENT — PAIN DESCRIPTION - FREQUENCY
FREQUENCY: CONTINUOUS

## 2020-02-26 ASSESSMENT — PAIN DESCRIPTION - DESCRIPTORS
DESCRIPTORS: ACHING;DISCOMFORT
DESCRIPTORS: ACHING;DISCOMFORT
DESCRIPTORS: ACHING;DISCOMFORT;SORE

## 2020-02-26 NOTE — PROGRESS NOTES
heart disease     Tachycardia     Closed fracture of nasal bone     Acute kidney injury ([facility] [address]     Acute renal failure (ARF) ([facility])     Closed fracture of sacrum, initial encounter ([facility])     DDD (degenerative disc disease), lumbar     Hypoalbuminemia     Unable to ambulate     Physical deconditioning     CKD (chronic kidney disease)     History of aortic valve replacement     Multiple fracture      Plan/  1. Will increase pain medicine at her request.   2. Continue precautions per ortho  3. Continue bowel regimen  4. Continue dvt prophylaxis  5.  Increase activity as able          Bill Christine MD

## 2020-02-26 NOTE — PROGRESS NOTES
when descending and with eccentric control when descending. In PM, continues to have difficulty with standing/eccentric control tasks due to pain and OA/crepitus. Trial car transfer with difficulty returning to standing and bringing legs into car. Pt pulling from door to return to standing to assist with reaching standing during car transfer. Educated on proper technique. Will continue to progress mobility as able. AM  Time in: 0915  Time out: 1000    PM  Time in: 1300  Time out: 1345    Pt is making fair progress toward established Physical Therapy goals. Continue with physical therapy current plan of care.     Soraya Lindsay, HEIDYT  [id number]

## 2020-02-26 NOTE — PROGRESS NOTES
Occupational Therapy  OCCUPATIONAL THERAPY DAILY NOTE    Date:2020  Patient Name: Cyndie Dunbar  MRN: [id number]  : 1940  Room: [address] [place] [address]A     Diagnosis: Pelvic fx- superior & inferior pubic rami fx- R sacral alar fx & subacute fx R L5  Additional Pertinent Hx: OA, HTN, HLD, CKD, CAD, anemia, chronic back pain, pain pump, hx L2 compression fx- stable  Precautions: falls,WBAT RLE, Spinal Precautions     Functional Assessment:   Date Status AE  Comments   Feeding 20  Mod I      Grooming 20  Minimal Assist      Bathing 20 Maximal Assist      UB Dressing 20  Supervision      LB Dressing 20  Maximal Assist  Sock aide   pt used sock aid to don slipper socks at [facility] level    Homemaking 20 Set up ( seated)       Functional Transfers / Balance:   Date Status DME  Comments   Sit Balance 20  Supervision      Stand Balance 20  Mod A ww Standing at  level    [] Tub  [] Shower   Transfer 20   TBA   Pt declined due to level of pain in pelvis    Commode   Transfer 20   Mod A  Ww, BSC  Pt needs increased time to complete transfer due to pain    Functional   Mobility 20   Mod A  ww A few steps with ww    Other:sit to stand     Stand pivot        On/off recliner  20  Mod A       Mod A       Max A  ww      ww    ww  W/c to ww level               Functional Exercises / Activity:  BUE strength exercises:small yellow can do bar 3 sets 15 reps                                           2 # weighted ball 3 sets 10 reps in all planes                                                       B hand strength: 3 # digi flex 3 sets 15 reps          Pt completed reacher training activity picking up items off of floor using a reacher at set up level in preparation of using reacher to assist for LB dressing                                                                  Sensory / Neuromuscular Re-Education:      Cognitive Skills:   Status Comments Problem   Solving good     Memory good     Sequencing good     Safety fair       Visual Perception:    Education:   pt educated on safe functional transfers on and off commode     [] Family teach completed on:    Pain Level: 8/10 when standing in her BLE    Additional Notes:   2/23/2020- Pt complained that she is not sleeping well. When pt questioned further pt stated she normally sleeps in a recliner @ home as she is unable to get in or out of bed prior to her admission. Patient has made good  progress during treatment sessions toward set goals. Therapy emphasis to obtain goals: Current Treatment Recommendations: Pain Management, Positioning, Gait Training, Safety Education & Training, Balance Training, Patient/Caregiver Education & Training, Self-Care / ADL, Functional Mobility Training, Equipment Evaluation, Education, & procurement, Home Management Training, Endurance Training     [x] Continue with current OT Plan of care.   [] Prepare for Discharge     DISCHARGE RECOMMENDATIONS  Recommended DME:  [facility]     Post Discharge Care:   []Home Independently  []Home with 24hr Care / Supervision []Home with Partial Supervision []Home with Home Health OT []Home with Out Pt OT []Other: ___   Comments:         Time in Time out Tx Time Breakdown  Variance:   First Session  [id number]  [x] Individual Tx-45    [] Concurrent Tx   [] Co-Tx -   [] Group Tx -   [] Time Missed -     Second Session [id number]   [] Individual Tx-  [x] Concurrent Tx -45  [] Co-Tx -   [] Group Tx -   [] Time Missed -     Third Session    [] Individual Tx-   [] Concurrent Tx -  [] Co-Tx -   [] Group Tx -   [] Time Missed -         Total Tx Time:  90 mins    Purnima Moran OTR/L [id number]

## 2020-02-27 PROCEDURE — 6370000000 HC RX 637 (ALT 250 FOR IP): Performed by: PHYSICAL MEDICINE & REHABILITATION

## 2020-02-27 PROCEDURE — 1280000000 HC REHAB R&B

## 2020-02-27 PROCEDURE — 97530 THERAPEUTIC ACTIVITIES: CPT

## 2020-02-27 PROCEDURE — 97110 THERAPEUTIC EXERCISES: CPT

## 2020-02-27 PROCEDURE — 6360000002 HC RX W HCPCS: Performed by: INTERNAL MEDICINE

## 2020-02-27 PROCEDURE — 6370000000 HC RX 637 (ALT 250 FOR IP): Performed by: INTERNAL MEDICINE

## 2020-02-27 PROCEDURE — 97535 SELF CARE MNGMENT TRAINING: CPT

## 2020-02-27 PROCEDURE — 51798 US URINE CAPACITY MEASURE: CPT

## 2020-02-27 RX ADMIN — Medication 2000 UNITS: at 08:45

## 2020-02-27 RX ADMIN — FERROUS SULFATE TAB 325 MG (65 MG ELEMENTAL FE) 325 MG: 325 (65 FE) TAB at 08:46

## 2020-02-27 RX ADMIN — HEPARIN SODIUM 5000 UNITS: 10000 INJECTION INTRAVENOUS; SUBCUTANEOUS at 17:33

## 2020-02-27 RX ADMIN — CARVEDILOL 12.5 MG: 6.25 TABLET, FILM COATED ORAL at 08:46

## 2020-02-27 RX ADMIN — NYSTATIN AND TRIAMCINOLONE ACETONIDE: 100000; 1 CREAM TOPICAL at 08:50

## 2020-02-27 RX ADMIN — SKIN PROTECTANT: 44 OINTMENT TOPICAL at 08:49

## 2020-02-27 RX ADMIN — FERROUS SULFATE TAB 325 MG (65 MG ELEMENTAL FE) 325 MG: 325 (65 FE) TAB at 17:33

## 2020-02-27 RX ADMIN — HYDROCODONE BITARTRATE AND ACETAMINOPHEN 1 TABLET: 5; 325 TABLET ORAL at 21:50

## 2020-02-27 RX ADMIN — DOCUSATE SODIUM 100 MG: 100 CAPSULE, LIQUID FILLED ORAL at 21:57

## 2020-02-27 RX ADMIN — HYDROCODONE BITARTRATE AND ACETAMINOPHEN 1 TABLET: 5; 325 TABLET ORAL at 12:55

## 2020-02-27 RX ADMIN — HEPARIN SODIUM 5000 UNITS: 10000 INJECTION INTRAVENOUS; SUBCUTANEOUS at 06:54

## 2020-02-27 RX ADMIN — HYDROCODONE BITARTRATE AND ACETAMINOPHEN 1 TABLET: 5; 325 TABLET ORAL at 17:31

## 2020-02-27 RX ADMIN — NYSTATIN OINTMENT: 100000 OINTMENT TOPICAL at 21:57

## 2020-02-27 RX ADMIN — Medication 250 MG: at 21:49

## 2020-02-27 RX ADMIN — NYSTATIN AND TRIAMCINOLONE ACETONIDE: 100000; 1 CREAM TOPICAL at 21:57

## 2020-02-27 RX ADMIN — CARVEDILOL 6.25 MG: 6.25 TABLET, FILM COATED ORAL at 17:32

## 2020-02-27 RX ADMIN — ASPIRIN 81 MG 81 MG: 81 TABLET ORAL at 08:46

## 2020-02-27 RX ADMIN — CLOPIDOGREL 75 MG: 75 TABLET, FILM COATED ORAL at 08:46

## 2020-02-27 RX ADMIN — HYDROCODONE BITARTRATE AND ACETAMINOPHEN 1 TABLET: 5; 325 TABLET ORAL at 08:46

## 2020-02-27 RX ADMIN — DOCUSATE SODIUM 100 MG: 100 CAPSULE, LIQUID FILLED ORAL at 08:46

## 2020-02-27 RX ADMIN — HYDROCODONE BITARTRATE AND ACETAMINOPHEN 1 TABLET: 5; 325 TABLET ORAL at 04:30

## 2020-02-27 RX ADMIN — FUROSEMIDE 40 MG: 40 TABLET ORAL at 08:46

## 2020-02-27 RX ADMIN — ROSUVASTATIN CALCIUM 20 MG: 20 TABLET, FILM COATED ORAL at 21:49

## 2020-02-27 ASSESSMENT — PAIN DESCRIPTION - ONSET
ONSET: ON-GOING

## 2020-02-27 ASSESSMENT — PAIN SCALES - GENERAL
PAINLEVEL_OUTOF10: 6
PAINLEVEL_OUTOF10: 5
PAINLEVEL_OUTOF10: 0
PAINLEVEL_OUTOF10: 10
PAINLEVEL_OUTOF10: 8
PAINLEVEL_OUTOF10: 8
PAINLEVEL_OUTOF10: 7
PAINLEVEL_OUTOF10: 7
PAINLEVEL_OUTOF10: 0

## 2020-02-27 ASSESSMENT — PAIN DESCRIPTION - ORIENTATION
ORIENTATION: RIGHT
ORIENTATION: LOWER

## 2020-02-27 ASSESSMENT — PAIN DESCRIPTION - PAIN TYPE
TYPE: ACUTE PAIN
TYPE: CHRONIC PAIN

## 2020-02-27 ASSESSMENT — PAIN DESCRIPTION - LOCATION
LOCATION: BACK
LOCATION: BACK;PELVIS
LOCATION: BACK
LOCATION: GROIN;PELVIS
LOCATION: BACK
LOCATION: BACK

## 2020-02-27 ASSESSMENT — PAIN DESCRIPTION - PROGRESSION
CLINICAL_PROGRESSION: NOT CHANGED

## 2020-02-27 ASSESSMENT — PAIN DESCRIPTION - FREQUENCY
FREQUENCY: CONTINUOUS

## 2020-02-27 ASSESSMENT — PAIN DESCRIPTION - DESCRIPTORS
DESCRIPTORS: ACHING;NAGGING
DESCRIPTORS: ACHING;NAGGING
DESCRIPTORS: ACHING;DISCOMFORT
DESCRIPTORS: ACHING;STABBING;TENDER
DESCRIPTORS: ACHING;NAGGING;DISCOMFORT

## 2020-02-27 ASSESSMENT — PAIN DESCRIPTION - DIRECTION: RADIATING_TOWARDS: HIP AND GROIN

## 2020-02-27 NOTE — PROGRESS NOTES
Physical Therapy    Facility/Department: [facility]  Treatment Note     NAME: Ana Saini  : 1940  MRN: [id number]    Date of Service: 2020    Evaluating Therapist: Beni Shirley DPT    ROOM: [facility]-Q  DIAGNOSIS: multiple trauma  PMH: To ED on 20 for back/hip pain. X-ray right hip/CT lumbar spine: Fracture about the superior and inferior pubic rami on the right as well as right sacral alar fracture (Right LC 1 pelvic ring injury). Noted Subacute Fx R L5 Transverse Process. Chronic Partly Healed R L5 Pars Fx and Chronic Stable L2 Compression Fx PMH includes anemia, HTN, OA, osteoporosis, CABG x2, valvular heart disease. PRECAUTIONS: WBAT RLE, Falls, Spinal Neutral,     Pt lives alone in a 1 story home with 3 stairs to enter with a L HR. Bed is on first floor and bath is on first floor. Pt ambulated with rollator independently PTA. Equipment Owned: [employer]; Rollator. Pt reporting shuffled patterning with ambulation PTA. Pt reporting her son lives next door and assists PRN. Initial Evaluation  20 AM PM Short Term Goals Long Term Goals    Was pt agreeable to Eval/treatment? yes yes yes     Does pt have pain?  3/10 right groin at rest  8/10 right groin with activty  3/10 right groin at rest  8/10 right groin with activty 3/10 right groin at rest  8/10 right groin with activty     Bed Mobility  Rolling: Julissa  Supine to sit: Julissa  Sit to supine: modA  Scooting: Julissa Rolling: sba  Supine to sit: sba/sup  Sit to supine: cg/sba  Scooting: sba/sup NT sup Mod I   Transfers Sit to stand: min/modA  Stand to sit: min/modA  Stand pivot: min/modA with ww Sit to stand: sba  Stand to sit: sba  Stand pivot: cgA/sba with ww Sit to stand: sba  Stand to sit: sba  Stand pivot: cgA/sba with ww sba with AAD Mod I with AAD   Ambulation    8 feet with ww with Julissa 45' with ww with sba 35' feet with ww with sba 50 feet with AAD with sba >150 feet with AAD with mod I   Walking 10 feet on uneven surface NT noted. No verbal cues for hand placement t/o session, however at times having difficulty with eccentric lowering due to knee OA/crepitus. Pt requesting to descend with the uninvolved LE, reporting less pain that way with no buckling occurring. In PM, continued to progress mobility as able. Continues to report pain with R LE weight bearing that reduced with activity. Will continue to increase activity tolerance as able. AM  Time in: 0915  Time out: 1000    PM  Time in: 1300  Time out: 1345    Pt is making fair progress toward established Physical Therapy goals. Continue with physical therapy current plan of care.     Patricia Swartz, HEIDYT  [id number]

## 2020-02-27 NOTE — PROGRESS NOTES
Physical Therapy    Facility/Department: [address] [facility]  Weekly Progress Note     NAME: Haydee Mo  : 1940  MRN: [id number]    Date of Service: 2020    Evaluating Therapist: Ney Frances DPT    ROOM: [address] [place] [address]  DIAGNOSIS: multiple trauma  PMH: To ED on 20 for back/hip pain. X-ray right hip/CT lumbar spine: Fracture about the superior and inferior pubic rami on the right as well as right sacral alar fracture (Right LC 1 pelvic ring injury). Noted Subacute Fx R L5 Transverse Process. Chronic Partly Healed R L5 Pars Fx and Chronic Stable L2 Compression Fx PMH includes anemia, HTN, OA, osteoporosis, CABG x2, valvular heart disease. PRECAUTIONS: WBAT RLE, Falls, Spinal Neutral,     Pt lives alone in a 1 story home with 3 stairs to enter with a L HR. Bed is on first floor and bath is on first floor. Pt ambulated with rollator independently PTA. Equipment Owned: [employer]; Rollator. Pt reporting shuffled patterning with ambulation PTA. Pt reporting her son lives next door and assists PRN.       Initial Evaluation  20 Comments Short Term Goals Long Term Goals    Bed Mobility  Rolling: Julissa  Supine to sit: Julissa  Sit to supine: modA  Scooting: Julissa Rolling: sba  Supine to sit: sba/sup  Sit to supine: cg/sba  Scooting: sba/sup  sup Mod I   Transfers Sit to stand: min/modA  Stand to sit: min/modA  Stand pivot: min/modA with ww Sit to stand: sba  Stand to sit: sba  Stand pivot: cgA/sba with ww Increased time and difficulty with eccentric lowering due to knee OA/crepitus  sba with AAD Mod I with AAD   Ambulation    8 feet with ww with Julissa 30-45' feet with ww with sba Step through patterning, antalgic  50 feet with AAD with sba >150 feet with AAD with mod I   Wheel Chair Mobility 76' with bilateral UE sba  NT  50 with bilateral UE sup  >150' with bilateral UE mod I   Car Transfers NT Julissa  sup Mod I   Stair negotiation: ascended and descended NT 4 steps with bilateral HR cg/Julissa Pt descending with

## 2020-02-27 NOTE — PROGRESS NOTES
descending retrograde)   4 steps with bilateral rail with Julissa >4 steps with one rail with sba   Curb Step:   ascended and descended NT   2 in curb step with ww cg/Julissa 4 inch step with AAD and Julissa 4 inch step with AAD and sba   Picking up object off the floor NT           BLE ROM WFL (gross knee flexion/extension limitations due to OA/audible crepitus noted)           BLE Strength R LE: hip 3-/5, knee 3+/5, ankle 4/5     L LE: hip 3+/5, knee 4/5, ankle 4/5           Balance  Sitting: Independent   Standing: min/modA with ww                 Scheduled Meds:   sodium chloride flush  10 mL Intravenous 2 times per day    aspirin  81 mg Oral Daily    carvedilol  12.5 mg Oral QAM    carvedilol  6.25 mg Oral Nightly    clopidogrel  75 mg Oral Daily    ferrous sulfate  325 mg Oral BID    furosemide  40 mg Oral Daily    heparin (porcine)  5,000 Units Subcutaneous Q12H    magnesium gluconate  250 mg Oral Nightly    mineral oil-hydrophilic petrolatum   Topical Daily    nystatin-triamcinolone   Topical BID    rosuvastatin  20 mg Oral Daily    vitamin D  2,000 Units Oral Daily    docusate sodium  100 mg Oral BID    nystatin   Topical BID     Continuous Infusions:  PRN Meds:HYDROcodone-acetaminophen, magnesium hydroxide, sodium chloride flush, polyethylene glycol  I/O last 3 completed shifts: In: 12 [P.O.:960]  Out: 2050 [Urine:2050]  I/O this shift:  In: 240 [P.O.:240]  Out: 550 [Urine:550]    Labs reviewed  CBC: No results for input(s): WBC, HGB, PLT in the last 72 hours. BMP:  No results for input(s): NA, K, CL, CO2, BUN, CREATININE, GLUCOSE in the last 72 hours. Hepatic: No results for input(s): AST, ALT, ALB, BILITOT, ALKPHOS in the last 72 hours. BNP: No results for input(s): BNP in the last 72 hours. Lipids: No results for input(s): CHOL, HDL in the last 72 hours. Invalid input(s): LDLCALCU  INR: No results for input(s): INR in the last 72 hours.     Assessment/  Patient Active Problem List: Fall due to stumbling     Anemia     HTN (hypertension)     OA (osteoarthritis)     Spinal stenosis     Osteoporosis     Chronic pain     Facial contusion     Compression fx, lumbar spine (HCC)     Compression fx, thoracic spine (HCC)     Valvular heart disease     Tachycardia     Closed fracture of nasal bone     Acute kidney injury ([facility] [address]     Acute renal failure (ARF) ([facility])     Closed fracture of sacrum, initial encounter ([facility])     DDD (degenerative disc disease), lumbar     Hypoalbuminemia     Unable to ambulate     Physical deconditioning     CKD (chronic kidney disease)     History of aortic valve replacement     Multiple fracture      Plan/  1. Increased frequency of pain medicine  2. Continue dvt prophylaxis  3. Continue mobilize as able   4. Continue precautions per ortho  5.  Continue other medications without change          Wesley Guerrier MD

## 2020-02-27 NOTE — PROGRESS NOTES
Occupational Therapy  OCCUPATIONAL THERAPY DAILY NOTE    Date:2020  Patient Name: Heather Cortez  MRN: [id number]  : 1940  Room: [address] [place] [address]A     Diagnosis: Pelvic fx- superior & inferior pubic rami fx- R sacral alar fx & subacute fx R L5  Additional Pertinent Hx: OA, HTN, HLD, CKD, CAD, anemia, chronic back pain, pain pump, hx L2 compression fx- stable  Precautions: falls,WBAT RLE, Spinal Precautions     Functional Assessment:   Date Status AE  Comments   Feeding 20  Mod I      Grooming 20  SBA  Seated at sink for oral hygiene   Bathing 20 Mod A  Pt deferred full bathing task in shower, completed sponge bath seated/standing at sink with assist to wash paul areas and below B knees   UB Dressing 20  Supervision   Pt able to lucio bra and pullover shirt with increased time   LB Dressing 20  Mod Assist  reacher,Sock aide   pt used reacher to lucio pants/brief and assist with heel of slip on shoes (unable to use shoe horn due to low height of back of shoes)   Homemaking 20 Set up ( seated)       Functional Transfers / Balance:   Date Status DME  Comments   Sit Balance 20  Supervision      Stand Balance 20  Mod A ww    [] Tub  [] Shower   Transfer 20   TBA      Commode   Transfer 20   Min A  Ww, BSC  Stand pivot chair-commode   Functional   Mobility 20   Min A  ww Pt only able to take a couple of steps   Other:sit to stand     Stand pivot        On/off recliner  20  Mod A       Mod A       Max A  ww      ww    ww       Functional Exercises / Activity:        Pt sitting in chair upon arrival to . Attempted mobility to bathroom but pt only able to take a couple of steps with ww until she was limited by pain. Completed ADLs this AM, see above for assessment. Pt appeared to have tolerated session fairly but is limited by pain.                                   2# dowel ann 3 X 10 scapular protraction/retraction and elbow flexion 10:00 10:45 [] Individual Tx-  [x] Concurrent Tx - 45 Mins  [] Co-Tx -   [] Group Tx -   [] Time Missed -     Third Session    [] Individual Tx-   [] Concurrent Tx -  [] Co-Tx -   [] Group Tx -   [] Time Missed -         Total Tx Time: 90 Mins      [facility] SEWELL/L [address], [place], OTR/L [id number]

## 2020-02-28 PROCEDURE — 97110 THERAPEUTIC EXERCISES: CPT

## 2020-02-28 PROCEDURE — 97530 THERAPEUTIC ACTIVITIES: CPT

## 2020-02-28 PROCEDURE — 6370000000 HC RX 637 (ALT 250 FOR IP): Performed by: PHYSICAL MEDICINE & REHABILITATION

## 2020-02-28 PROCEDURE — 97535 SELF CARE MNGMENT TRAINING: CPT

## 2020-02-28 PROCEDURE — 1280000000 HC REHAB R&B

## 2020-02-28 PROCEDURE — 6370000000 HC RX 637 (ALT 250 FOR IP): Performed by: INTERNAL MEDICINE

## 2020-02-28 PROCEDURE — 6360000002 HC RX W HCPCS: Performed by: INTERNAL MEDICINE

## 2020-02-28 RX ADMIN — NYSTATIN AND TRIAMCINOLONE ACETONIDE: 100000; 1 CREAM TOPICAL at 20:39

## 2020-02-28 RX ADMIN — HYDROCODONE BITARTRATE AND ACETAMINOPHEN 1 TABLET: 5; 325 TABLET ORAL at 17:26

## 2020-02-28 RX ADMIN — NYSTATIN OINTMENT: 100000 OINTMENT TOPICAL at 20:39

## 2020-02-28 RX ADMIN — HYDROCODONE BITARTRATE AND ACETAMINOPHEN 1 TABLET: 5; 325 TABLET ORAL at 06:32

## 2020-02-28 RX ADMIN — NYSTATIN AND TRIAMCINOLONE ACETONIDE: 100000; 1 CREAM TOPICAL at 08:54

## 2020-02-28 RX ADMIN — FERROUS SULFATE TAB 325 MG (65 MG ELEMENTAL FE) 325 MG: 325 (65 FE) TAB at 08:53

## 2020-02-28 RX ADMIN — Medication 2000 UNITS: at 08:53

## 2020-02-28 RX ADMIN — CARVEDILOL 12.5 MG: 6.25 TABLET, FILM COATED ORAL at 08:54

## 2020-02-28 RX ADMIN — NYSTATIN OINTMENT: 100000 OINTMENT TOPICAL at 08:55

## 2020-02-28 RX ADMIN — FUROSEMIDE 40 MG: 40 TABLET ORAL at 08:54

## 2020-02-28 RX ADMIN — HYDROCODONE BITARTRATE AND ACETAMINOPHEN 1 TABLET: 5; 325 TABLET ORAL at 12:12

## 2020-02-28 RX ADMIN — DOCUSATE SODIUM 100 MG: 100 CAPSULE, LIQUID FILLED ORAL at 08:53

## 2020-02-28 RX ADMIN — Medication 250 MG: at 20:38

## 2020-02-28 RX ADMIN — DOCUSATE SODIUM 100 MG: 100 CAPSULE, LIQUID FILLED ORAL at 20:37

## 2020-02-28 RX ADMIN — FERROUS SULFATE TAB 325 MG (65 MG ELEMENTAL FE) 325 MG: 325 (65 FE) TAB at 17:14

## 2020-02-28 RX ADMIN — SKIN PROTECTANT: 44 OINTMENT TOPICAL at 08:55

## 2020-02-28 RX ADMIN — ROSUVASTATIN CALCIUM 20 MG: 20 TABLET, FILM COATED ORAL at 20:39

## 2020-02-28 RX ADMIN — CLOPIDOGREL 75 MG: 75 TABLET, FILM COATED ORAL at 08:54

## 2020-02-28 RX ADMIN — HEPARIN SODIUM 5000 UNITS: 10000 INJECTION INTRAVENOUS; SUBCUTANEOUS at 06:32

## 2020-02-28 RX ADMIN — HYDROCODONE BITARTRATE AND ACETAMINOPHEN 1 TABLET: 5; 325 TABLET ORAL at 21:27

## 2020-02-28 RX ADMIN — HEPARIN SODIUM 5000 UNITS: 10000 INJECTION INTRAVENOUS; SUBCUTANEOUS at 17:16

## 2020-02-28 RX ADMIN — ASPIRIN 81 MG 81 MG: 81 TABLET ORAL at 08:53

## 2020-02-28 RX ADMIN — CARVEDILOL 6.25 MG: 6.25 TABLET, FILM COATED ORAL at 17:16

## 2020-02-28 ASSESSMENT — PAIN SCALES - GENERAL
PAINLEVEL_OUTOF10: 5
PAINLEVEL_OUTOF10: 3
PAINLEVEL_OUTOF10: 3
PAINLEVEL_OUTOF10: 8
PAINLEVEL_OUTOF10: 6
PAINLEVEL_OUTOF10: 8
PAINLEVEL_OUTOF10: 0
PAINLEVEL_OUTOF10: 8

## 2020-02-28 ASSESSMENT — PAIN DESCRIPTION - FREQUENCY
FREQUENCY: CONTINUOUS

## 2020-02-28 ASSESSMENT — PAIN DESCRIPTION - PAIN TYPE
TYPE: CHRONIC PAIN

## 2020-02-28 ASSESSMENT — PAIN DESCRIPTION - LOCATION
LOCATION: BACK
LOCATION: BACK;HIP

## 2020-02-28 ASSESSMENT — PAIN DESCRIPTION - DESCRIPTORS
DESCRIPTORS: ACHING

## 2020-02-28 ASSESSMENT — PAIN DESCRIPTION - ONSET
ONSET: ON-GOING

## 2020-02-28 ASSESSMENT — PAIN DESCRIPTION - ORIENTATION
ORIENTATION: RIGHT;LOWER
ORIENTATION: RIGHT
ORIENTATION: RIGHT

## 2020-02-28 NOTE — PROGRESS NOTES
Progress Note    Subjective/   [de-identified]y.o. year old female on the rehab unit for multiple fractures. She denies any new complaints. No chest pain or palpitations. No dizziness. No shortness of breath. Pain under fair to good control. Objective/   VITALS:  BP (!) 150/72   Pulse 68   Temp 97.9 °F (36.6 °C) (Temporal)   Resp 17   Ht 5' 2\" (1.575 m)   Wt 180 lb 4.8 oz (81.8 kg)   SpO2 92%   BMI 32.98 kg/m²   24HR INTAKE/OUTPUT:      Intake/Output Summary (Last 24 hours) at 2/28/2020 0827  Last data filed at 2/27/2020 1747  Gross per 24 hour   Intake 720 ml   Output 300 ml   Net 420 ml     Constitutional:  Alert, awake, no apparent distress   Cardiovascular:  S1, S2 without m/r/g   Respiratory:  CTA B without w/r/r   Abdomen: Positive bowel sounds  Ext: No pitting LE edema, alignment good  Neuro: Awake, alert and oriented x2-3    Functional Level    Initial Evaluation  2/21/20 AM PM Short Term Goals Long Term Goals    Was pt agreeable to Eval/treatment? yes yes yes       Does pt have pain?  3/10 right groin at rest  8/10 right groin with activty  R groin pain  6/10 R groin/thigh pain        Bed Mobility  Rolling: Julissa  Supine to sit: Julissa  Sit to supine: modA  Scooting: Julissa NT   sup Mod I   Transfers Sit to stand: min/modA  Stand to sit: min/modA  Stand pivot: min/modA with ww Sit to stand: sba  Stand to sit: sba  Stand pivot: sba with ww Sit to stand: sba/sup  Stand to sit: sba/sup  Stand pivot: sba with ww sba with AAD Mod I with AAD   Ambulation    8 feet with ww with Julissa 50 feet with ww with SBA 65 feet with ww with sba/sup  50 feet with AAD with sba >150 feet with AAD with mod I   Walking 10 feet on uneven surface NT NT         Wheel Chair Mobility 76' with bilateral UE sba      50 with bilateral UE sup  >150' with bilateral UE mod I   Car Transfers NT NT   sup Mod I   Stair negotiation: ascended and descended NT NT 4 steps x2 reps with bilateral HR cgA 4 steps with bilateral rail with Julissa >4 steps with one rail with sba   Curb Step:   ascended and descended NT NT 2 in curb step with ww cg/sba  4 inch step with AAD and Julissa 4 inch step with AAD and sba   Picking up object off the floor NT           BLE ROM WFL (gross knee flexion/extension limitations due to OA/audible crepitus noted)           BLE Strength R LE: hip 3-/5, knee 3+/5, ankle 4/5     L LE: hip 3+/5, knee 4/5, ankle 4/5           Balance  Sitting: Independent   Standing: min/modA with ww                 Scheduled Meds:   sodium chloride flush  10 mL Intravenous 2 times per day    aspirin  81 mg Oral Daily    carvedilol  12.5 mg Oral QAM    carvedilol  6.25 mg Oral Nightly    clopidogrel  75 mg Oral Daily    ferrous sulfate  325 mg Oral BID    furosemide  40 mg Oral Daily    heparin (porcine)  5,000 Units Subcutaneous Q12H    magnesium gluconate  250 mg Oral Nightly    mineral oil-hydrophilic petrolatum   Topical Daily    nystatin-triamcinolone   Topical BID    rosuvastatin  20 mg Oral Daily    vitamin D  2,000 Units Oral Daily    docusate sodium  100 mg Oral BID    nystatin   Topical BID     Continuous Infusions:  PRN Meds:HYDROcodone-acetaminophen, magnesium hydroxide, sodium chloride flush, polyethylene glycol  I/O last 3 completed shifts: In: 1080 [P.O.:1080]  Out: 300 [Urine:300]  No intake/output data recorded. Labs reviewed  CBC: No results for input(s): WBC, HGB, PLT in the last 72 hours. BMP:  No results for input(s): NA, K, CL, CO2, BUN, CREATININE, GLUCOSE in the last 72 hours. Hepatic: No results for input(s): AST, ALT, ALB, BILITOT, ALKPHOS in the last 72 hours. BNP: No results for input(s): BNP in the last 72 hours. Lipids: No results for input(s): CHOL, HDL in the last 72 hours. Invalid input(s): LDLCALCU  INR: No results for input(s): INR in the last 72 hours.     Assessment/  Patient Active Problem List:     Fall due to stumbling     Anemia     HTN (hypertension)     OA (osteoarthritis)     Spinal stenosis     Osteoporosis     Chronic pain     Facial contusion     Compression fx, lumbar spine ([facility])     Compression fx, thoracic spine ([facility])     Valvular heart disease     Tachycardia     Closed fracture of nasal bone     Acute kidney injury ([facility] [address]     Acute renal failure (ARF) ([facility])     Closed fracture of sacrum, initial encounter ([facility])     DDD (degenerative disc disease), lumbar     Hypoalbuminemia     Unable to ambulate     Physical deconditioning     CKD (chronic kidney disease)     History of aortic valve replacement     Multiple fracture      Plan/  1. See team conference note - ELOS 2 weeks  2. Continue current medications. 3. Continue rehab program with focus on improved mobility and increased independence  4. Continue pain control  5. Continue DVT prophylaxis  6.  Maintain precautions          Sina Abel MD

## 2020-02-28 NOTE — PATIENT CARE CONFERENCE
ft.  at standby assist  Long term ambulation goal: 150 ft.  at Modified independent    Car transfers:   Current level: min assist  Short term car transfers goal: supervision  Long term car transfers goal:Modified independent    Stairs:   Current level : 4 with 2 rails at min assist  Short term stairs goal: met  Long term stairs goal: 4 with 1 rail at standby assist        OCCUPATIONAL THERAPY:      Tub/shower:   Current level: to be assessed    Feeding:  Current level: Modified independent  Short term feeding goal: Modified independent  Long term feeding goal: Modified independent    Grooming:   Current level: supervision  Short term grooming goal: Modified independent  Long term grooming goal: Modified independent    Bathing:  Current level: mod assist, pain hinders  Short term bathing goal: min assist  Long term bathing goal: supervision    Homemaking:   Current level: to be assessed    Upper body dressing:  Current level: supervision  Short term upper body dressing goal: Modified independent  Long term upper body dressing goal: Modified independent    Lower body dressing:  Current level: mod assist with adaptive equipment  Short term lower body dressing goal: min assist  Long term lower body dressing goal: Modified independent    Toilet transfer:   Current level: min assist with wheeled walker  Short term toilet transfer goal: Contact guard assist  Long term toilet transfer goal: Modified independent      Other comments: does recreation therapy      Social interaction:  Modified independent    Safety awareness: fair      Patient/family's personal goals: \"get back home\"  Factors supporting goal achievement:  better mobility, improving activity tolerance  Factors hindering goal achievement:  pain, osteoarthritis    Discharge Plan   Estimated Length of Stay: 2 weeks            Destination: home  Services at Discharge: to be assessed  Equipment at Discharge: to be assessed      INTERDISCIPLINARY TEAM/PHYSICIAN RECOMMENDATION AND/OR REVISIONS OF PLAN OF CARE:  schedule family education       I approve the established interdisciplinary plan of care as documented within the medical record of Mirela Kinney. Electronically signed by Zhao Carson RN  on 2/28/2020 at 8:24 AM  The following interdisciplinary team members were present:  [facility] Blunt, [facility]LSW  Shravan Llanes.  Rachel Maddox, MYRIAM Arriola, OTR  Zana Hector, Marty Hung [address], CCC-SLP

## 2020-02-28 NOTE — PROGRESS NOTES
Occupational Therapy  OCCUPATIONAL THERAPY DAILY NOTE    Date:2020  Patient Name: Ana Saini  MRN: [id number]  : 1940  Room: [address] [place], [place] [address]     Diagnosis: Pelvic fx- superior & inferior pubic rami fx- R sacral alar fx & subacute fx R L5  Additional Pertinent Hx: OA, HTN, HLD, CKD, CAD, anemia, chronic back pain, pain pump, hx L2 compression fx- stable  Precautions: falls,WBAT RLE, Spinal Precautions     Functional Assessment:   Date Status AE  Comments   Feeding 20  Mod I      Grooming 20  SBA     Bathing 20 Mod A     UB Dressing 20  Supervision      LB Dressing 20  Mod Assist  reacher,Sock aide  To lucio/doff pants, slip on shoes increased time for pt to orientate pants attempting to thread B LE into individual pant leg   Homemaking 20 Set up ( seated)       Functional Transfers / Balance:   Date Status DME  Comments   Sit Balance 20  Supervision      Stand Balance 20  Mod A ww    [] Tub  [] Shower   Transfer 20   TBA      Commode   Transfer 20   Min A  Ww, BSC     Functional   Mobility 20   Min A  ww    Other:sit to stand     Stand pivot        On/off recliner  20  Mod A       Mod A       Max A  ww      ww    ww  V/c's for hand placement and safety      Functional Exercises / Activity:    Pt engaged in B UE hand ex's with puttycise knob turn, cap turn tool and mod resistant theraputty focusing on hand strength/endurance, gross grasp, pinch grasp to increase independence with ADL/IADL tasks; Pt engaged in leisure activity puzzle wearing 1# wrist weights on B UE with pt requiring min assist to complete puzzle increased time. Therapeutic activity completed focusing on insight/reasoning and endurance to increase independence with ADL tasks;               Sensory / Neuromuscular Re-Education:      Cognitive Skills:   Status Comments   Problem   Solving good     Memory good     Sequencing good     Safety fair       Visual

## 2020-02-28 NOTE — CARE COORDINATION
Per team: LASHAWN: (2 wks). Updated pt at bedside and pt's son Sara Galeas via phone. Pt is WBAT-RLE. Pt's pain is hindering her at times.      Aftercare/DME: TBAWAIS    FT: later date    Qumas,  intern  Chapo Fountain  2/28/2020

## 2020-02-28 NOTE — PROGRESS NOTES
Physical Therapy    Facility/Department: [address] REHAB  Treatment Note     NAME: Claudeen Commander  : 1940  MRN: [id number]    Date of Service: 2020    Evaluating Therapist: Mio Meek DPT    ROOM: [address] [place] [address]  DIAGNOSIS: multiple trauma  PMH: To ED on 20 for back/hip pain. X-ray right hip/CT lumbar spine: Fracture about the superior and inferior pubic rami on the right as well as right sacral alar fracture (Right LC 1 pelvic ring injury). Noted Subacute Fx R L5 Transverse Process. Chronic Partly Healed R L5 Pars Fx and Chronic Stable L2 Compression Fx PMH includes anemia, HTN, OA, osteoporosis, CABG x2, valvular heart disease. PRECAUTIONS: WBAT RLE, Falls, Spinal Neutral,     Pt lives alone in a 1 story home with 3 stairs to enter with a L HR. Bed is on first floor and bath is on first floor. Pt ambulated with rollator independently PTA. Equipment Owned: [employer]; Rollator. Pt reporting shuffled patterning with ambulation PTA. Pt reporting her son lives next door and assists PRN. Initial Evaluation  20 AM PM Short Term Goals Long Term Goals    Was pt agreeable to Eval/treatment? yes yes yes     Does pt have pain?  3/10 right groin at rest  8/10 right groin with activty  R groin pain  6/10 R groin/thigh pain      Bed Mobility  Rolling: Julissa  Supine to sit: Julissa  Sit to supine: modA  Scooting: Julissa NT  sup Mod I   Transfers Sit to stand: min/modA  Stand to sit: min/modA  Stand pivot: min/modA with ww Sit to stand: sba  Stand to sit: sba  Stand pivot: sba with ww Sit to stand: sba/sup  Stand to sit: sba/sup  Stand pivot: sba with ww sba with AAD Mod I with AAD   Ambulation    8 feet with ww with Julissa 50 feet with ww with SBA 65 feet with ww with sba/sup  50 feet with AAD with sba >150 feet with AAD with mod I   Walking 10 feet on uneven surface NT NT      Wheel Chair Mobility 76' with bilateral UE sba    50 with bilateral UE sup  >150' with bilateral UE mod I   Car Transfers NT NT  sup Mod I

## 2020-02-28 NOTE — PROGRESS NOTES
Progress Note    Subjective/   [de-identified]y.o. year old female on the rehab unit for multiple fractures. No SOB or chest pain. No dizziness. Pain better controlled with current pain regimen. Objective/   VITALS:  BP (!) 120/51   Pulse 68   Temp 98.2 °F (36.8 °C) (Temporal)   Resp 16   Ht 5' 2\" (1.575 m)   Wt 180 lb 4.8 oz (81.8 kg)   SpO2 93%   BMI 32.98 kg/m²   24HR INTAKE/OUTPUT:      Intake/Output Summary (Last 24 hours) at 2/27/2020 2153  Last data filed at 2/27/2020 1747  Gross per 24 hour   Intake 1080 ml   Output 600 ml   Net 480 ml     Constitutional:  Alert, awake, no apparent distress   Cardiovascular:  S1, S2 without m/r/g   Respiratory:  CTA B without w/r/r   Abdomen: +BS  Ext: no pitting LE edema  Neuro: awake and alert, no tremor. Tone is normal.     Functional Level      Date   Status   AE    Comments     Feeding   2/23/20    Mod I              Grooming   2/27/20    SBA       Seated at sink for oral hygiene     Bathing   2/27/20   Mod A       Pt deferred full bathing task in shower, completed sponge bath seated/standing at sink with assist to wash paul areas and below B knees     UB Dressing   2/27/20    Supervision        Pt able to lucio bra and pullover shirt with increased time     LB Dressing   2/27/20    Mod Assist    reacher,Sock aide     pt used reacher to lucio pants/brief and assist with heel of slip on shoes (unable to use shoe horn due to low height of back of shoes)     Homemaking   2/24/20   Set up ( seated)                  Functional Transfers / Balance:      Date Status DME  Comments   Sit Balance 2/23/20  Supervision        Stand Balance 2/26/20  Mod A ww     []? Tub  []?  Shower   Transfer 2/26/20   TBA        Commode   Transfer 2/27/20   Min A  Ww, BSC  Stand pivot chair-commode   Functional   Mobility 2/27/20   Min A  ww Pt only able to take a couple of steps   Other:sit to stand      Stand pivot          On/off recliner  2/26/20 2/25/20 2/25/20  Mod A       Mod A         Max A  ww        ww     ww         Functional Exercises / Activity:        Pt sitting in chair upon arrival to . Attempted mobility to bathroom but pt only able to take a couple of steps with ww until she was limited by pain. Completed ADLs this AM, see above for assessment. Pt appeared to have tolerated session fairly but is limited by pain. 2# dowel ann 3 X 10 scapular protraction/retraction and elbow flexion for increased independence with functional tasks and transfers.      Min resistive kassy bar X 25 reps on 4 planes to increase BUE forearm, wrist and  strength for independence with functional tasks.      Min resistive theraputty with wheel and coins for increased BUE strength, B hand strength and endurance for ADLs. Pt completes with fair tolerance rolling out putty with wheel and removing coins from putty. Sensory / Neuromuscular Re-Education:        Cognitive Skills:    Status Comments   Problem   Solving good      Memory good      Sequencing good      Safety fair             Scheduled Meds:   sodium chloride flush  10 mL Intravenous 2 times per day    aspirin  81 mg Oral Daily    carvedilol  12.5 mg Oral QAM    carvedilol  6.25 mg Oral Nightly    clopidogrel  75 mg Oral Daily    ferrous sulfate  325 mg Oral BID    furosemide  40 mg Oral Daily    heparin (porcine)  5,000 Units Subcutaneous Q12H    magnesium gluconate  250 mg Oral Nightly    mineral oil-hydrophilic petrolatum   Topical Daily    nystatin-triamcinolone   Topical BID    rosuvastatin  20 mg Oral Daily    vitamin D  2,000 Units Oral Daily    docusate sodium  100 mg Oral BID    nystatin   Topical BID     Continuous Infusions:  PRN Meds:HYDROcodone-acetaminophen, magnesium hydroxide, sodium chloride flush, polyethylene glycol  I/O last 3 completed shifts:   In: 1080 [P.O.:1080]  Out: 850 [Urine:850]  I/O this shift:  In: 240 [P.O.:240]  Out: 300

## 2020-02-29 PROCEDURE — 6360000002 HC RX W HCPCS: Performed by: INTERNAL MEDICINE

## 2020-02-29 PROCEDURE — 6370000000 HC RX 637 (ALT 250 FOR IP): Performed by: PHYSICAL MEDICINE & REHABILITATION

## 2020-02-29 PROCEDURE — 97530 THERAPEUTIC ACTIVITIES: CPT

## 2020-02-29 PROCEDURE — 6370000000 HC RX 637 (ALT 250 FOR IP): Performed by: INTERNAL MEDICINE

## 2020-02-29 PROCEDURE — 1280000000 HC REHAB R&B

## 2020-02-29 RX ADMIN — HYDROCODONE BITARTRATE AND ACETAMINOPHEN 1 TABLET: 5; 325 TABLET ORAL at 11:10

## 2020-02-29 RX ADMIN — DOCUSATE SODIUM 100 MG: 100 CAPSULE, LIQUID FILLED ORAL at 08:57

## 2020-02-29 RX ADMIN — CARVEDILOL 12.5 MG: 6.25 TABLET, FILM COATED ORAL at 08:57

## 2020-02-29 RX ADMIN — NYSTATIN OINTMENT: 100000 OINTMENT TOPICAL at 20:26

## 2020-02-29 RX ADMIN — HEPARIN SODIUM 5000 UNITS: 10000 INJECTION INTRAVENOUS; SUBCUTANEOUS at 17:48

## 2020-02-29 RX ADMIN — CLOPIDOGREL 75 MG: 75 TABLET, FILM COATED ORAL at 08:57

## 2020-02-29 RX ADMIN — SKIN PROTECTANT: 44 OINTMENT TOPICAL at 09:01

## 2020-02-29 RX ADMIN — HYDROCODONE BITARTRATE AND ACETAMINOPHEN 1 TABLET: 5; 325 TABLET ORAL at 20:20

## 2020-02-29 RX ADMIN — NYSTATIN AND TRIAMCINOLONE ACETONIDE: 100000; 1 CREAM TOPICAL at 08:59

## 2020-02-29 RX ADMIN — ROSUVASTATIN CALCIUM 20 MG: 20 TABLET, FILM COATED ORAL at 20:26

## 2020-02-29 RX ADMIN — HYDROCODONE BITARTRATE AND ACETAMINOPHEN 1 TABLET: 5; 325 TABLET ORAL at 16:06

## 2020-02-29 RX ADMIN — Medication 2000 UNITS: at 08:57

## 2020-02-29 RX ADMIN — FUROSEMIDE 40 MG: 40 TABLET ORAL at 08:57

## 2020-02-29 RX ADMIN — Medication 250 MG: at 20:26

## 2020-02-29 RX ADMIN — FERROUS SULFATE TAB 325 MG (65 MG ELEMENTAL FE) 325 MG: 325 (65 FE) TAB at 08:57

## 2020-02-29 RX ADMIN — NYSTATIN AND TRIAMCINOLONE ACETONIDE: 100000; 1 CREAM TOPICAL at 20:27

## 2020-02-29 RX ADMIN — FERROUS SULFATE TAB 325 MG (65 MG ELEMENTAL FE) 325 MG: 325 (65 FE) TAB at 17:46

## 2020-02-29 RX ADMIN — HEPARIN SODIUM 5000 UNITS: 10000 INJECTION INTRAVENOUS; SUBCUTANEOUS at 06:42

## 2020-02-29 RX ADMIN — ASPIRIN 81 MG 81 MG: 81 TABLET ORAL at 08:57

## 2020-02-29 RX ADMIN — NYSTATIN OINTMENT: 100000 OINTMENT TOPICAL at 08:59

## 2020-02-29 RX ADMIN — DOCUSATE SODIUM 100 MG: 100 CAPSULE, LIQUID FILLED ORAL at 20:25

## 2020-02-29 RX ADMIN — CARVEDILOL 6.25 MG: 6.25 TABLET, FILM COATED ORAL at 17:47

## 2020-02-29 RX ADMIN — HYDROCODONE BITARTRATE AND ACETAMINOPHEN 1 TABLET: 5; 325 TABLET ORAL at 06:42

## 2020-02-29 ASSESSMENT — PAIN DESCRIPTION - LOCATION
LOCATION: GROIN;HIP;BACK
LOCATION: GROIN
LOCATION: GROIN;HIP;BACK

## 2020-02-29 ASSESSMENT — PAIN DESCRIPTION - FREQUENCY
FREQUENCY: CONTINUOUS

## 2020-02-29 ASSESSMENT — PAIN SCALES - GENERAL
PAINLEVEL_OUTOF10: 0
PAINLEVEL_OUTOF10: 7
PAINLEVEL_OUTOF10: 7
PAINLEVEL_OUTOF10: 5
PAINLEVEL_OUTOF10: 6
PAINLEVEL_OUTOF10: 8
PAINLEVEL_OUTOF10: 8
PAINLEVEL_OUTOF10: 10

## 2020-02-29 ASSESSMENT — PAIN DESCRIPTION - ORIENTATION
ORIENTATION: RIGHT;LEFT;LOWER
ORIENTATION: RIGHT;LEFT;LOWER

## 2020-02-29 ASSESSMENT — PAIN DESCRIPTION - PROGRESSION
CLINICAL_PROGRESSION: GRADUALLY IMPROVING

## 2020-02-29 ASSESSMENT — PAIN DESCRIPTION - PAIN TYPE
TYPE: CHRONIC PAIN

## 2020-02-29 ASSESSMENT — PAIN DESCRIPTION - DESCRIPTORS
DESCRIPTORS: ACHING

## 2020-02-29 ASSESSMENT — PAIN DESCRIPTION - ONSET
ONSET: ON-GOING
ONSET: ON-GOING

## 2020-02-29 ASSESSMENT — PAIN DESCRIPTION - DIRECTION: RADIATING_TOWARDS: HIP AND GROIN

## 2020-02-29 NOTE — PROGRESS NOTES
[facility]  Progress Note    GENERAL:   Covering for Dr Jasvir Gale. [de-identified] y old female, admitted to the ARU on 2/20,  The patient was admitted to [facility] on 2/18, for worsening LBP radiating to the groins, and inability to walk. Patient has a Spinal cord stimulator for chronic LBP. She was found to have Acute and Subacute Right Sacral fractures, Subacute Fracture of the right L5 Transverse Process, as well as chronic partly healed L5 Pars Fracture and chronic Stable Compression fracture of L2. Patient up in the chair, has no complaints. Labs noted with Protein Calorie Malnutrition. VITALS:   Vitals:    02/27/20 1732 02/28/20 0800 02/28/20 1716 02/29/20 0756   BP: (!) 120/51 (!) 150/72 (!) 149/69 137/62   Pulse: 68 68 69 69   Resp:  17  16   Temp:  97.9 °F (36.6 °C)  97.6 °F (36.4 °C)   TempSrc:  Temporal  Temporal   SpO2:  92%  95%   Weight:       Height:           Scheduled Meds:   sodium chloride flush  10 mL Intravenous 2 times per day    aspirin  81 mg Oral Daily    carvedilol  12.5 mg Oral QAM    carvedilol  6.25 mg Oral Nightly    clopidogrel  75 mg Oral Daily    ferrous sulfate  325 mg Oral BID    furosemide  40 mg Oral Daily    heparin (porcine)  5,000 Units Subcutaneous Q12H    magnesium gluconate  250 mg Oral Nightly    mineral oil-hydrophilic petrolatum   Topical Daily    nystatin-triamcinolone   Topical BID    rosuvastatin  20 mg Oral Daily    vitamin D  2,000 Units Oral Daily    docusate sodium  100 mg Oral BID    nystatin   Topical BID     Continuous Infusions:  PRN Meds:. HYDROcodone-acetaminophen, magnesium hydroxide, sodium chloride flush, polyethylene glycol      LABS:  CBC with Differential:    Lab Results   Component Value Date    WBC 7.9 02/21/2020    RBC 3.86 02/21/2020    HGB 10.1 02/21/2020    HCT 33.8 02/21/2020     02/21/2020    MCV 87.6 02/21/2020    MCH 26.2 02/21/2020    MCHC 29.9 02/21/2020    RDW 17.4 02/21/2020    SEGSPCT 58

## 2020-02-29 NOTE — PROGRESS NOTES
Physical Therapy    Facility/Department: [facility] REHAB  Treatment Note     NAME: Heather Cortez  : 1940  MRN: [id number]    Date of Service: 2020    Evaluating Therapist: Hugh Jacobs DPT    ROOM: 0579/1058-E  DIAGNOSIS: multiple trauma  PMH: To ED on 20 for back/hip pain. X-ray right hip/CT lumbar spine: Fracture about the superior and inferior pubic rami on the right as well as right sacral alar fracture (Right LC 1 pelvic ring injury). Noted Subacute Fx R L5 Transverse Process. Chronic Partly Healed R L5 Pars Fx and Chronic Stable L2 Compression Fx PMH includes anemia, HTN, OA, osteoporosis, CABG x2, valvular heart disease. PRECAUTIONS: WBAT RLE, Falls, Spinal Neutral,     Pt lives alone in a 1 story home with 3 stairs to enter with a L HR. Bed is on first floor and bath is on first floor. Pt ambulated with rollator independently PTA. Equipment Owned: [employer]; Rollator. Pt reporting shuffled patterning with ambulation PTA. Pt reporting her son lives next door and assists PRN. Initial Evaluation  20 AM PM Short Term Goals Long Term Goals    Was pt agreeable to Eval/treatment? yes yes yes     Does pt have pain?  3/10 right groin at rest  8/10 right groin with activty  7/10 R groin pain  6/10 R groin/thigh pain      Bed Mobility  Rolling: Julissa  Supine to sit: Julissa  Sit to supine: modA  Scooting: Julissa     NT  sup Mod I   Transfers Sit to stand: min/modA  Stand to sit: min/modA  Stand pivot: min/modA with ww Sit to stand: from toilet mod assist   Stand to sit to toilet min  Sit  To stand from chair  cga to sba    Stand to sit: cga  - sits slowly and decrease ecentric control at the landing phase of sitting    Stand pivot: sba to cga  with ww Sit to stand: sba/sup  Stand to sit: sba/sup  Stand pivot: sba with ww sba with AAD Mod I with AAD   Ambulation    8 feet with ww with Julissa 50 feet, 60 feet 12 feet  with ww with SBA/cga  65 feet with ww with sba/sup  50 feet with AAD with sba >150 feet with session. AM  Time in: 0930  Time out: 1000      Pt is making fair progress toward established Physical Therapy goals. Continue with physical therapy current plan of care.     Sherif Parkinson, [address]

## 2020-03-01 LAB — VITAMIN D 25-HYDROXY: 14 NG/ML (ref 30–100)

## 2020-03-01 PROCEDURE — 36415 COLL VENOUS BLD VENIPUNCTURE: CPT

## 2020-03-01 PROCEDURE — 6360000002 HC RX W HCPCS: Performed by: INTERNAL MEDICINE

## 2020-03-01 PROCEDURE — 6370000000 HC RX 637 (ALT 250 FOR IP): Performed by: INTERNAL MEDICINE

## 2020-03-01 PROCEDURE — 82306 VITAMIN D 25 HYDROXY: CPT

## 2020-03-01 PROCEDURE — 1280000000 HC REHAB R&B

## 2020-03-01 PROCEDURE — 6370000000 HC RX 637 (ALT 250 FOR IP): Performed by: PHYSICAL MEDICINE & REHABILITATION

## 2020-03-01 RX ADMIN — ROSUVASTATIN CALCIUM 20 MG: 20 TABLET, FILM COATED ORAL at 21:45

## 2020-03-01 RX ADMIN — HEPARIN SODIUM 5000 UNITS: 10000 INJECTION INTRAVENOUS; SUBCUTANEOUS at 17:48

## 2020-03-01 RX ADMIN — Medication 250 MG: at 22:19

## 2020-03-01 RX ADMIN — DOCUSATE SODIUM 100 MG: 100 CAPSULE, LIQUID FILLED ORAL at 21:45

## 2020-03-01 RX ADMIN — FERROUS SULFATE TAB 325 MG (65 MG ELEMENTAL FE) 325 MG: 325 (65 FE) TAB at 10:12

## 2020-03-01 RX ADMIN — HYDROCODONE BITARTRATE AND ACETAMINOPHEN 1 TABLET: 5; 325 TABLET ORAL at 16:47

## 2020-03-01 RX ADMIN — FUROSEMIDE 40 MG: 40 TABLET ORAL at 10:13

## 2020-03-01 RX ADMIN — HYDROCODONE BITARTRATE AND ACETAMINOPHEN 1 TABLET: 5; 325 TABLET ORAL at 01:00

## 2020-03-01 RX ADMIN — ASPIRIN 81 MG 81 MG: 81 TABLET ORAL at 10:12

## 2020-03-01 RX ADMIN — HEPARIN SODIUM 5000 UNITS: 10000 INJECTION INTRAVENOUS; SUBCUTANEOUS at 05:43

## 2020-03-01 RX ADMIN — CARVEDILOL 12.5 MG: 6.25 TABLET, FILM COATED ORAL at 10:19

## 2020-03-01 RX ADMIN — HYDROCODONE BITARTRATE AND ACETAMINOPHEN 1 TABLET: 5; 325 TABLET ORAL at 05:43

## 2020-03-01 RX ADMIN — HYDROCODONE BITARTRATE AND ACETAMINOPHEN 1 TABLET: 5; 325 TABLET ORAL at 10:15

## 2020-03-01 RX ADMIN — CLOPIDOGREL 75 MG: 75 TABLET, FILM COATED ORAL at 10:13

## 2020-03-01 RX ADMIN — DOCUSATE SODIUM 100 MG: 100 CAPSULE, LIQUID FILLED ORAL at 10:18

## 2020-03-01 RX ADMIN — CARVEDILOL 6.25 MG: 6.25 TABLET, FILM COATED ORAL at 17:47

## 2020-03-01 RX ADMIN — FERROUS SULFATE TAB 325 MG (65 MG ELEMENTAL FE) 325 MG: 325 (65 FE) TAB at 17:47

## 2020-03-01 RX ADMIN — HYDROCODONE BITARTRATE AND ACETAMINOPHEN 1 TABLET: 5; 325 TABLET ORAL at 21:45

## 2020-03-01 RX ADMIN — Medication 2000 UNITS: at 10:13

## 2020-03-01 ASSESSMENT — PAIN SCALES - GENERAL
PAINLEVEL_OUTOF10: 6
PAINLEVEL_OUTOF10: 9
PAINLEVEL_OUTOF10: 7
PAINLEVEL_OUTOF10: 8
PAINLEVEL_OUTOF10: 8
PAINLEVEL_OUTOF10: 5
PAINLEVEL_OUTOF10: 9
PAINLEVEL_OUTOF10: 8
PAINLEVEL_OUTOF10: 6
PAINLEVEL_OUTOF10: 0

## 2020-03-01 ASSESSMENT — PAIN DESCRIPTION - PAIN TYPE
TYPE: CHRONIC PAIN

## 2020-03-01 ASSESSMENT — PAIN DESCRIPTION - LOCATION
LOCATION: BACK;GROIN;HIP
LOCATION: GROIN;BACK;HIP

## 2020-03-01 ASSESSMENT — PAIN DESCRIPTION - ORIENTATION
ORIENTATION: RIGHT;LEFT;LOWER
ORIENTATION: RIGHT
ORIENTATION: RIGHT
ORIENTATION: RIGHT;LEFT;LOWER
ORIENTATION: RIGHT
ORIENTATION: RIGHT

## 2020-03-01 ASSESSMENT — PAIN DESCRIPTION - ONSET
ONSET: ON-GOING

## 2020-03-01 ASSESSMENT — PAIN DESCRIPTION - DESCRIPTORS
DESCRIPTORS: ACHING
DESCRIPTORS: ACHING;CONSTANT
DESCRIPTORS: ACHING
DESCRIPTORS: ACHING

## 2020-03-01 ASSESSMENT — PAIN DESCRIPTION - PROGRESSION
CLINICAL_PROGRESSION: GRADUALLY IMPROVING
CLINICAL_PROGRESSION: NOT CHANGED

## 2020-03-01 ASSESSMENT — PAIN DESCRIPTION - FREQUENCY: FREQUENCY: CONTINUOUS

## 2020-03-01 NOTE — PROGRESS NOTES
Occupational Therapy  OCCUPATIONAL THERAPY DAILY NOTE    Date:3/1/2020  Patient Name: Heather Cortez  MRN: [id number]  : 1940  Room: [address] [place] [address]-A     Diagnosis: Pelvic fx- superior & inferior pubic rami fx- R sacral alar fx & subacute fx R L5  Additional Pertinent Hx: OA, HTN, HLD, CKD, CAD, anemia, chronic back pain, pain pump, hx L2 compression fx- stable  Precautions: falls,WBAT RLE, Spinal Precautions     Functional Assessment:   Date Status AE  Comments   Feeding 20  Mod I      Grooming 20  SBA     Bathing 20 Mod A     UB Dressing 20  Supervision      LB Dressing 3/1/20  Mod Assist  reacher,Sock aide Pt used sock aid to lucio gripper socks needing one cue for for proper technique. Pt used reacher to lucio/[facility] hospital shorts and threaded RLE first needing one cue for technique & following spinal precautions. Homemaking 3/1/20 CGA/min assist Standing  Pt stood at table top folding washcloths and pillow cases to increase endurance, strength and standing tolerance. Functional Transfers / Balance:   Date Status DME  Comments   Sit Balance 3/1/20  Supervision   Sitting balance up in w/c and during A.E training for LB dressing skills. Stand Balance 3/1/20  CGA/min assist ww Pt stood at table top during hmkg task to increase dyn/static skills    [] Tub  [] Shower   Transfer 20   TBA   3/1/20 Deferred tub transfers due to patient just took pain meds for groin/RLE issues    Commode   Transfer 20   Min A  Ww, [facility]     Functional   Mobility 20   Min A  ww    Other:sit to stand     Stand pivot        On/off recliner  3/1/20       2/25/20       2/25/20  CGA. /min a       Mod A       Max A  ww      ww    ww  V/c's for hand placement and safety with sit to stand transfers on/off w/c<>table top. Functional Exercises / Activity:  Pt engaged in BUE ex's wearing 1# wts during hmkg/folding activity to increase strength/endurance for ADL's, transfers and mobility.   Pt stood at table top to improve dyn/static skills along with standing tolerance to increase strength/endurance. Pt engaged in LB dressing/A.E training to increase ADL;s while following spinal precautions. Pt used reacher to retrieve bean bags off table placing into sidelined bucket to increase A.E use along with strength/endurance. Sensory / Neuromuscular Re-Education:      Cognitive Skills:   Status Comments   Problem   Solving good     Memory good     Sequencing good     Safety fair       Visual Perception:    Education:   pt educated on safety with sit to stand transfers to increase awareness. Pt educated with A.E training to increase LB dressing and ADL;s while following spinal precautions. [] Family teach completed on:    Pain Level: AM session 8-9/10 pain groin and back and nurse giving pain meds to patient. Additional Notes:   2/23/2020- Pt complained that she is not sleeping well. When pt questioned further pt stated she normally sleeps in a recliner @ home as she is unable to get in or out of bed prior to her admission. Patient has made good  progress during treatment sessions toward set goals. Therapy emphasis to obtain goals: Current Treatment Recommendations: Pain Management, Positioning, Gait Training, Safety Education & Training, Balance Training, Patient/Caregiver Education & Training, Self-Care / ADL, Functional Mobility Training, Equipment Evaluation, Education, & procurement, Home Management Training, Endurance Training     [x] Continue with current OT Plan of care.   [] Prepare for Discharge     DISCHARGE RECOMMENDATIONS  Recommended DME:  [facility]     Post Discharge Care:   []Home Independently  []Home with 24hr Care / Supervision []Home with Partial Supervision []Home with Home Health OT []Home with Out Pt OT []Other: ___   Comments:         Time in Time out Tx Time Breakdown  Variance:   First Session  10:55am 11:40am [] Individual Tx-    [x] Concurrent Tx 45 min  [] Co-Tx -   [] Group Tx -   [] Time Missed -     Second Session   [] Individual Tx-  [] Concurrent Tx -  Mins  [] Co-Tx -   [] Group Tx -   [] Time Missed -     Third Session    [] Individual Tx-   [] Concurrent Tx -  [] Co-Tx -   [] Group Tx -   [] Time Missed -         Total Tx Time:45 Mins      [facility]  SEWELL/L [id number]

## 2020-03-02 PROCEDURE — 97535 SELF CARE MNGMENT TRAINING: CPT

## 2020-03-02 PROCEDURE — 97530 THERAPEUTIC ACTIVITIES: CPT

## 2020-03-02 PROCEDURE — 6370000000 HC RX 637 (ALT 250 FOR IP): Performed by: INTERNAL MEDICINE

## 2020-03-02 PROCEDURE — 97110 THERAPEUTIC EXERCISES: CPT

## 2020-03-02 PROCEDURE — 6360000002 HC RX W HCPCS: Performed by: INTERNAL MEDICINE

## 2020-03-02 PROCEDURE — 1280000000 HC REHAB R&B

## 2020-03-02 PROCEDURE — 6370000000 HC RX 637 (ALT 250 FOR IP): Performed by: PHYSICAL MEDICINE & REHABILITATION

## 2020-03-02 RX ADMIN — FUROSEMIDE 40 MG: 40 TABLET ORAL at 08:23

## 2020-03-02 RX ADMIN — FERROUS SULFATE TAB 325 MG (65 MG ELEMENTAL FE) 325 MG: 325 (65 FE) TAB at 17:14

## 2020-03-02 RX ADMIN — HYDROCODONE BITARTRATE AND ACETAMINOPHEN 1 TABLET: 5; 325 TABLET ORAL at 20:15

## 2020-03-02 RX ADMIN — CARVEDILOL 6.25 MG: 6.25 TABLET, FILM COATED ORAL at 17:14

## 2020-03-02 RX ADMIN — CARVEDILOL 12.5 MG: 6.25 TABLET, FILM COATED ORAL at 08:22

## 2020-03-02 RX ADMIN — HEPARIN SODIUM 5000 UNITS: 10000 INJECTION INTRAVENOUS; SUBCUTANEOUS at 17:15

## 2020-03-02 RX ADMIN — FERROUS SULFATE TAB 325 MG (65 MG ELEMENTAL FE) 325 MG: 325 (65 FE) TAB at 08:22

## 2020-03-02 RX ADMIN — HYDROCODONE BITARTRATE AND ACETAMINOPHEN 1 TABLET: 5; 325 TABLET ORAL at 15:38

## 2020-03-02 RX ADMIN — Medication 2000 UNITS: at 08:23

## 2020-03-02 RX ADMIN — HEPARIN SODIUM 5000 UNITS: 10000 INJECTION INTRAVENOUS; SUBCUTANEOUS at 05:22

## 2020-03-02 RX ADMIN — HYDROCODONE BITARTRATE AND ACETAMINOPHEN 1 TABLET: 5; 325 TABLET ORAL at 06:24

## 2020-03-02 RX ADMIN — ASPIRIN 81 MG 81 MG: 81 TABLET ORAL at 08:22

## 2020-03-02 RX ADMIN — HYDROCODONE BITARTRATE AND ACETAMINOPHEN 1 TABLET: 5; 325 TABLET ORAL at 02:07

## 2020-03-02 RX ADMIN — DOCUSATE SODIUM 100 MG: 100 CAPSULE, LIQUID FILLED ORAL at 08:23

## 2020-03-02 RX ADMIN — ROSUVASTATIN CALCIUM 20 MG: 20 TABLET, FILM COATED ORAL at 20:14

## 2020-03-02 RX ADMIN — HYDROCODONE BITARTRATE AND ACETAMINOPHEN 1 TABLET: 5; 325 TABLET ORAL at 10:28

## 2020-03-02 RX ADMIN — CLOPIDOGREL 75 MG: 75 TABLET, FILM COATED ORAL at 08:23

## 2020-03-02 RX ADMIN — Medication 250 MG: at 20:14

## 2020-03-02 RX ADMIN — DOCUSATE SODIUM 100 MG: 100 CAPSULE, LIQUID FILLED ORAL at 20:15

## 2020-03-02 ASSESSMENT — PAIN SCALES - GENERAL
PAINLEVEL_OUTOF10: 10
PAINLEVEL_OUTOF10: 4
PAINLEVEL_OUTOF10: 4
PAINLEVEL_OUTOF10: 8
PAINLEVEL_OUTOF10: 6
PAINLEVEL_OUTOF10: 8
PAINLEVEL_OUTOF10: 10
PAINLEVEL_OUTOF10: 5
PAINLEVEL_OUTOF10: 8

## 2020-03-02 ASSESSMENT — PAIN DESCRIPTION - PAIN TYPE
TYPE: ACUTE PAIN

## 2020-03-02 ASSESSMENT — PAIN DESCRIPTION - ORIENTATION
ORIENTATION: RIGHT
ORIENTATION: LEFT
ORIENTATION: LEFT

## 2020-03-02 ASSESSMENT — PAIN DESCRIPTION - FREQUENCY: FREQUENCY: CONTINUOUS

## 2020-03-02 ASSESSMENT — PAIN DESCRIPTION - DESCRIPTORS: DESCRIPTORS: ACHING

## 2020-03-02 ASSESSMENT — PAIN DESCRIPTION - ONSET: ONSET: ON-GOING

## 2020-03-02 ASSESSMENT — PAIN DESCRIPTION - LOCATION
LOCATION: GROIN;BACK
LOCATION: KNEE
LOCATION: KNEE

## 2020-03-02 NOTE — PROGRESS NOTES
Perception:    Education:   pt educated on safety with sit to stand transfers to increase awareness. Pt educated with A.E training to increase LB dressing and ADL;s while following spinal precautions. [] Family teach completed on:    Pain Level:   Pt c/o pain in groin during extended tub bench transfer. Additional Notes:   2/23/2020- Pt complained that she is not sleeping well. When pt questioned further pt stated she normally sleeps in a recliner @ home as she is unable to get in or out of bed prior to her admission. Patient has made good  progress during treatment sessions toward set goals. Therapy emphasis to obtain goals: Current Treatment Recommendations: Pain Management, Positioning, Gait Training, Safety Education & Training, Balance Training, Patient/Caregiver Education & Training, Self-Care / ADL, Functional Mobility Training, Equipment Evaluation, Education, & procurement, Home Management Training, Endurance Training     [x] Continue with current OT Plan of care. [] Prepare for Discharge     DISCHARGE RECOMMENDATIONS  Recommended DME:  [facility]     Post Discharge Care:   []Home Independently  []Home with 24hr Care / Supervision []Home with Partial Supervision []Home with Home Health OT []Home with Out Pt OT []Other: ___   Comments:         Time in Time out Tx Time Breakdown  Variance:   First Session  10:45 11:30 [] Individual Tx-   [x] Concurrent Tx 45 min  [] Co-Tx -   [] Group Tx -   [] Time Missed -     Second Session 1:45 2:30 [x] Individual Tx- 45 Mins  [] Concurrent Tx -   [] Co-Tx -   [] Group Tx -   [] Time Missed -     Third Session    [] Individual Tx-   [] Concurrent Tx -  [] Co-Tx -   [] Group Tx -   [] Time Missed -         Total Tx Time:90Mins      Gerri Ceron  SEWELL/L [id number]  I have read the above note and agree with the documentation.   Lorie Tian OTR/L [id number]

## 2020-03-02 NOTE — PROGRESS NOTES
Transfers NT NT  sup Mod I   Stair negotiation: ascended and descended NT 4 steps with bilateral HR cgA/sba 4 steps x2 reps with bilateral HR cgA/sba 4 steps with bilateral rail with Julissa >4 steps with one rail with sba   Curb Step:   ascended and descended NT  2 in curb step with ww sba  4 inch step with AAD and Julissa 4 inch step with AAD and sba   Picking up object off the floor NT       BLE ROM WFL (gross knee flexion/extension limitations due to OA/audible crepitus noted)       BLE Strength R LE: hip 3-/5, knee 3+/5, ankle 4/5    L LE: hip 3+/5, knee 4/5, ankle 4/5       Balance  Sitting: Independent   Standing: min/modA with ww       Date Family Teach Completed TBA       Is additional Family Teaching Needed? Y or N Y       Hindering Progress Pain, OA       PT recommended ELOS 3 weeks       Team's Discharge Plan        Therapist at Team Meeting          Therapeutic Exercise:   AM: 5x STS transfers at w/c x2 reps, no cues for hand placement    Seated LAQ, Hip flexion, AP  2x15 to increase LE strength and ROM for transfers    PM: 5x STS transfers at w/c, no cues for hand placement  Curb step negotiation, cues for patterning   Stair negotiation, ascending and descending with L LE x2 reps      Patient education  Pt educated on hand placement with transfers, WBAT patterning with curb/stair negotiation. Patient response to education:   Pt verbalized understanding Pt demonstrated skill Pt requires further education in this area   x X with verbal cues  x     Additional Comments: Reviewed all mobility as per above. Continues to report hip/groin pain t/o session, however mobility continues to improve with reciprocal patterning demonstrated t/o ambulation. Stair negotiation continues to be completed with L LE leading when advancing/descending due to L LE ROM/OA deficits. In PM, continued to progress mobility as tolerated. Antalgic patterning persisting but demonstrating step through patterning with ww.  Will continue to progress as able. Nursing notified of c/o L posterior knee aching t/o session. AM  Time in: 0915  Time out: 1000    PM  Time in: 1300  Time out: 1345    Pt is making fair progress toward established Physical Therapy goals. Continue with physical therapy current plan of care.     Eleni Obregon DPT  [id number]

## 2020-03-03 PROCEDURE — 97110 THERAPEUTIC EXERCISES: CPT

## 2020-03-03 PROCEDURE — 97530 THERAPEUTIC ACTIVITIES: CPT

## 2020-03-03 PROCEDURE — 6360000002 HC RX W HCPCS: Performed by: INTERNAL MEDICINE

## 2020-03-03 PROCEDURE — 6370000000 HC RX 637 (ALT 250 FOR IP): Performed by: INTERNAL MEDICINE

## 2020-03-03 PROCEDURE — 6370000000 HC RX 637 (ALT 250 FOR IP): Performed by: PHYSICAL MEDICINE & REHABILITATION

## 2020-03-03 PROCEDURE — 1280000000 HC REHAB R&B

## 2020-03-03 PROCEDURE — 97535 SELF CARE MNGMENT TRAINING: CPT

## 2020-03-03 RX ORDER — DOCUSATE SODIUM 100 MG/1
100 CAPSULE, LIQUID FILLED ORAL 3 TIMES DAILY
Status: DISCONTINUED | OUTPATIENT
Start: 2020-03-03 | End: 2020-03-11 | Stop reason: HOSPADM

## 2020-03-03 RX ADMIN — CLOPIDOGREL 75 MG: 75 TABLET, FILM COATED ORAL at 10:07

## 2020-03-03 RX ADMIN — ASPIRIN 81 MG 81 MG: 81 TABLET ORAL at 10:16

## 2020-03-03 RX ADMIN — FERROUS SULFATE TAB 325 MG (65 MG ELEMENTAL FE) 325 MG: 325 (65 FE) TAB at 10:08

## 2020-03-03 RX ADMIN — HYDROCODONE BITARTRATE AND ACETAMINOPHEN 1 TABLET: 5; 325 TABLET ORAL at 17:09

## 2020-03-03 RX ADMIN — FUROSEMIDE 40 MG: 40 TABLET ORAL at 10:07

## 2020-03-03 RX ADMIN — HEPARIN SODIUM 5000 UNITS: 10000 INJECTION INTRAVENOUS; SUBCUTANEOUS at 17:05

## 2020-03-03 RX ADMIN — DOCUSATE SODIUM 100 MG: 100 CAPSULE, LIQUID FILLED ORAL at 09:00

## 2020-03-03 RX ADMIN — DOCUSATE SODIUM 100 MG: 100 CAPSULE, LIQUID FILLED ORAL at 14:31

## 2020-03-03 RX ADMIN — FERROUS SULFATE TAB 325 MG (65 MG ELEMENTAL FE) 325 MG: 325 (65 FE) TAB at 17:09

## 2020-03-03 RX ADMIN — HYDROCODONE BITARTRATE AND ACETAMINOPHEN 1 TABLET: 5; 325 TABLET ORAL at 05:28

## 2020-03-03 RX ADMIN — HYDROCODONE BITARTRATE AND ACETAMINOPHEN 1 TABLET: 5; 325 TABLET ORAL at 21:17

## 2020-03-03 RX ADMIN — CARVEDILOL 6.25 MG: 6.25 TABLET, FILM COATED ORAL at 17:06

## 2020-03-03 RX ADMIN — DOCUSATE SODIUM 100 MG: 100 CAPSULE, LIQUID FILLED ORAL at 21:17

## 2020-03-03 RX ADMIN — HYDROCODONE BITARTRATE AND ACETAMINOPHEN 1 TABLET: 5; 325 TABLET ORAL at 11:59

## 2020-03-03 RX ADMIN — Medication 2000 UNITS: at 10:07

## 2020-03-03 RX ADMIN — Medication 250 MG: at 21:17

## 2020-03-03 RX ADMIN — NYSTATIN OINTMENT: 100000 OINTMENT TOPICAL at 21:17

## 2020-03-03 RX ADMIN — HEPARIN SODIUM 5000 UNITS: 10000 INJECTION INTRAVENOUS; SUBCUTANEOUS at 05:22

## 2020-03-03 RX ADMIN — DOCUSATE SODIUM 100 MG: 100 CAPSULE, LIQUID FILLED ORAL at 10:11

## 2020-03-03 RX ADMIN — ROSUVASTATIN CALCIUM 20 MG: 20 TABLET, FILM COATED ORAL at 21:17

## 2020-03-03 RX ADMIN — CARVEDILOL 12.5 MG: 6.25 TABLET, FILM COATED ORAL at 10:08

## 2020-03-03 ASSESSMENT — PAIN SCALES - GENERAL
PAINLEVEL_OUTOF10: 0
PAINLEVEL_OUTOF10: 5
PAINLEVEL_OUTOF10: 7
PAINLEVEL_OUTOF10: 7
PAINLEVEL_OUTOF10: 6
PAINLEVEL_OUTOF10: 5

## 2020-03-03 ASSESSMENT — PAIN DESCRIPTION - PAIN TYPE
TYPE: CHRONIC PAIN
TYPE: CHRONIC PAIN

## 2020-03-03 ASSESSMENT — PAIN DESCRIPTION - ONSET: ONSET: ON-GOING

## 2020-03-03 ASSESSMENT — PAIN - FUNCTIONAL ASSESSMENT: PAIN_FUNCTIONAL_ASSESSMENT: PREVENTS OR INTERFERES SOME ACTIVE ACTIVITIES AND ADLS

## 2020-03-03 ASSESSMENT — PAIN DESCRIPTION - PROGRESSION
CLINICAL_PROGRESSION: GRADUALLY IMPROVING
CLINICAL_PROGRESSION: GRADUALLY IMPROVING

## 2020-03-03 ASSESSMENT — PAIN DESCRIPTION - LOCATION
LOCATION: KNEE
LOCATION: KNEE

## 2020-03-03 ASSESSMENT — PAIN DESCRIPTION - DESCRIPTORS
DESCRIPTORS: ACHING
DESCRIPTORS: ACHING

## 2020-03-03 ASSESSMENT — PAIN DESCRIPTION - ORIENTATION
ORIENTATION: LEFT
ORIENTATION: LEFT

## 2020-03-03 ASSESSMENT — PAIN DESCRIPTION - FREQUENCY: FREQUENCY: CONTINUOUS

## 2020-03-03 NOTE — PROGRESS NOTES
sba    50 with bilateral UE sup  >150' with bilateral UE mod I   Car Transfers NT NT Sba/sup  sup Mod I   Stair negotiation: ascended and descended NT 4 steps with bilateral HR cgA/sba NT 4 steps with bilateral rail with Julissa >4 steps with one rail with sba   Curb Step:   ascended and descended NT  2 in curb step with ww sba/sup  4 inch step with AAD and Julissa 4 inch step with AAD and sba   Picking up object off the floor NT       BLE ROM WFL (gross knee flexion/extension limitations due to OA/audible crepitus noted)       BLE Strength R LE: hip 3-/5, knee 3+/5, ankle 4/5    L LE: hip 3+/5, knee 4/5, ankle 4/5       Balance  Sitting: Independent   Standing: min/modA with ww       Date Family Teach Completed TBA       Is additional Family Teaching Needed? Y or N Y       Hindering Progress Pain, OA       PT recommended ELOS 3 weeks       Team's Discharge Plan        Therapist at Team Meeting          Therapeutic Exercise:   AM: 5x STS transfers at w/c x2 reps, no cues for hand placement    Seated LAQ, Hip flexion, AP  2x15 to increase LE strength and ROM for transfers    PM: 5x STS transfers at w/c, no cues for hand placement  Curb step negotiation, cues for patterning   Car transfer      Patient education  Pt educated on hand placement with transfers, WBAT patterning with curb/stair negotiation. Patient response to education:   Pt verbalized understanding Pt demonstrated skill Pt requires further education in this area   yes yes  Reinforcement      Additional Comments: Reviewed all mobility as per above. More difficulty and time required when descending steps this date, with audible crepitus noted when descending. Improvement noted with ambulation distance and reciprocity t/o session. In PM, reviewed all mobility as per above. Continues to be limited by pain and slow/guarded patterning noted however reciprocal patterning demonstrated. Will continue to progress as able.      AM  Time in: 0915  Time out: 1000    PM  Time in: 1300  Time out: 1345    Pt is making fair progress toward established Physical Therapy goals. Continue with physical therapy current plan of care.     Vipul Dinh DPT  [id number]

## 2020-03-03 NOTE — PROGRESS NOTES
Progress Note    Subjective/   [de-identified]y.o. year old female on the rehab unit for pelvic fracture. No SOB or chest pain. No dizziness. No nausea or vomiting. Objective/   VITALS:  BP (!) 144/72   Pulse 70   Temp 98.2 °F (36.8 °C) (Temporal)   Resp 17   Ht 5' 2\" (1.575 m)   Wt 180 lb 4.8 oz (81.8 kg)   SpO2 97%   BMI 32.98 kg/m²   24HR INTAKE/OUTPUT:      Intake/Output Summary (Last 24 hours) at 3/2/2020 2134  Last data filed at 3/2/2020 1800  Gross per 24 hour   Intake 180 ml   Output --   Net 180 ml     Constitutional:  Alert, awake, no apparent distress   Cardiovascular:  S1, S2 without m/r/g   Respiratory:  CTA B without w/r/r   Abdomen: +BS, protuberant  Ext: 2+ (B) pitting LE edema  Neuro: awake and alert, no tremor. Tone is normal.     Functional Level    Initial Evaluation  2/21/20 AM PM Short Term Goals Long Term Goals    Was pt agreeable to Eval/treatment? yes yes yes       Does pt have pain?  3/10 right groin at rest  8/10 right groin with activty  R groin pain  6/10 R groin/thigh pain   L knee aching (posterior)       Bed Mobility  Rolling: Julissa  Supine to sit: Julissa  Sit to supine: modA  Scooting: Julissa NT NT sup Mod I   Transfers Sit to stand: min/modA  Stand to sit: min/modA  Stand pivot: min/modA with ww Sit to stand: sup   Stand to sit: sup  Stand pivot: sba/sup with ww Sit to stand: sup  Stand to sit: sup  Stand pivot: sba/sup with ww sba with AAD Mod I with AAD   Ambulation    8 feet with ww with Julissa 75 feet with ww with sba/sup 75 feet with ww with sup  50 feet with AAD with sba >150 feet with AAD with mod I   Walking 10 feet on uneven surface NT NT         Wheel Chair Mobility 76' with bilateral UE sba      50 with bilateral UE sup  >150' with bilateral UE mod I   Car Transfers NT NT   sup Mod I   Stair negotiation: ascended and descended NT 4 steps with bilateral HR cgA/sba 4 steps x2 reps with bilateral HR cgA/sba 4 steps with bilateral rail with Julissa >4 steps with one rail with sba   Curb Step:   ascended and descended NT   2 in curb step with ww sba  4 inch step with AAD and Julissa 4 inch step with AAD and sba   Picking up object off the floor NT           BLE ROM WFL (gross knee flexion/extension limitations due to OA/audible crepitus noted)           BLE Strength R LE: hip 3-/5, knee 3+/5, ankle 4/5     L LE: hip 3+/5, knee 4/5, ankle 4/5           Balance  Sitting: Independent   Standing: min/modA with ww                 Scheduled Meds:   aspirin  81 mg Oral Daily    carvedilol  12.5 mg Oral QAM    carvedilol  6.25 mg Oral Nightly    clopidogrel  75 mg Oral Daily    ferrous sulfate  325 mg Oral BID    furosemide  40 mg Oral Daily    heparin (porcine)  5,000 Units Subcutaneous Q12H    magnesium gluconate  250 mg Oral Nightly    mineral oil-hydrophilic petrolatum   Topical Daily    nystatin-triamcinolone   Topical BID    rosuvastatin  20 mg Oral Daily    vitamin D  2,000 Units Oral Daily    docusate sodium  100 mg Oral BID    nystatin   Topical BID     Continuous Infusions:  PRN Meds:HYDROcodone-acetaminophen, magnesium hydroxide, polyethylene glycol  I/O last 3 completed shifts: In: 200 [P.O.:200]  Out: -   I/O this shift:  In: 180 [P.O.:180]  Out: -     Labs reviewed  CBC: No results for input(s): WBC, HGB, PLT in the last 72 hours. BMP:  No results for input(s): NA, K, CL, CO2, BUN, CREATININE, GLUCOSE in the last 72 hours. Hepatic: No results for input(s): AST, ALT, ALB, BILITOT, ALKPHOS in the last 72 hours. BNP: No results for input(s): BNP in the last 72 hours. Lipids: No results for input(s): CHOL, HDL in the last 72 hours. Invalid input(s): LDLCALCU  INR: No results for input(s): INR in the last 72 hours.     Assessment/  Patient Active Problem List:     Fall due to stumbling     Anemia     HTN (hypertension)     OA (osteoarthritis)     Spinal stenosis     Osteoporosis     Chronic pain     Facial contusion     Compression fx, lumbar spine ([facility] [address]

## 2020-03-03 NOTE — PROGRESS NOTES
Occupational Therapy  OCCUPATIONAL THERAPY DAILY NOTE    Date:3/3/2020  Patient Name: Luke Booker  MRN: [id number]  : 1940  Room: [address] [place] [address]A     Diagnosis: Pelvic fx- superior & inferior pubic rami fx- R sacral alar fx & subacute fx R L5  Additional Pertinent Hx: OA, HTN, HLD, CKD, CAD, anemia, chronic back pain, pain pump, hx L2 compression fx- stable  Precautions: falls,WBAT RLE, Spinal Precautions     Functional Assessment:   Date Status AE  Comments   Feeding 20  Mod I      Grooming 20  SBA     Bathing 20 Mod A     UB Dressing 20  Supervision      LB Dressing 3/1/20  Mod Assist  reacher,Sock aide    Homemaking 3/1/20 CGA/min assist Standing       Functional Transfers / Balance:   Date Status DME  Comments   Sit Balance 3/1/20  Supervision      Stand Balance 3/3/20   CGA ww During toileting    [x] Tub  [] Shower   Transfer 3/2/20   Min A   .     Commode   Transfer    toileting  3/3/20      3/3/20   CGA      SBA  Ww, BSC      Pt completed hygiene and clothing management following voiding    Functional   Mobility 3/3/20    CGA ww Short distance with ww    Other:sit to stand       Stand pivot        On/off recliner  3/3/20       3/3/20         3/3/20   CGA      CGA         CGA  ww      ww        ww       Functional Exercises / Activity:  BUE strength exercises: robert box with 8 # wt on table top surface 3 sets 15 reps                                            2 # weighted ball 3 sets 10 reps in all planes of pt's tolerance   B hand strength with 3 # digi flex 3 sets 15 reps   B fine motor coordination activity with pt completing copy design puzzle seated at table top level      Sensory / Neuromuscular Re-Education:      Cognitive Skills:   Status Comments   Problem   Solving good     Memory good     Sequencing good     Safety fair       Visual Perception:    Education:  Pt was educated on pacing and energy conservation techniques to utilize during ADL's     [] Family teach completed on:    Pain Level:   6/10 pelvis      Additional Notes:   2/23/2020- Pt complained that she is not sleeping well. When pt questioned further pt stated she normally sleeps in a recliner @ home as she is unable to get in or out of bed prior to her admission. Patient has made good  progress during treatment sessions toward set goals. Therapy emphasis to obtain goals: Current Treatment Recommendations: Pain Management, Positioning, Gait Training, Safety Education & Training, Balance Training, Patient/Caregiver Education & Training, Self-Care / ADL, Functional Mobility Training, Equipment Evaluation, Education, & procurement, Home Management Training, Endurance Training     [x] Continue with current OT Plan of care.   [] Prepare for Discharge     DISCHARGE RECOMMENDATIONS  Recommended DME:  [facility]     Post Discharge Care:   []Home Independently  []Home with 24hr Care / Supervision []Home with Partial Supervision []Home with Home Health OT []Home with Out Pt OT []Other: ___   Comments:         Time in Time out Tx Time Breakdown  Variance:   First Session  [id number]  [x] Individual Tx-45   [] Concurrent Tx-  [] Co-Tx -   [] Group Tx -   [] Time Missed -     Second Session [id number]  [] Individual Tx-   [x] Concurrent Tx -45   [] Co-Tx -   [] Group Tx -   [] Time Missed -     Third Session    [] Individual Tx-   [] Concurrent Tx -  [] Co-Tx -   [] Group Tx -   [] Time Missed -         Total Tx Time: 90 mins      Victor Hugo Leader  OTR/L [id number]

## 2020-03-04 PROCEDURE — 97530 THERAPEUTIC ACTIVITIES: CPT

## 2020-03-04 PROCEDURE — 97110 THERAPEUTIC EXERCISES: CPT

## 2020-03-04 PROCEDURE — 6370000000 HC RX 637 (ALT 250 FOR IP): Performed by: INTERNAL MEDICINE

## 2020-03-04 PROCEDURE — 6370000000 HC RX 637 (ALT 250 FOR IP): Performed by: PHYSICAL MEDICINE & REHABILITATION

## 2020-03-04 PROCEDURE — 1280000000 HC REHAB R&B

## 2020-03-04 PROCEDURE — 6360000002 HC RX W HCPCS: Performed by: INTERNAL MEDICINE

## 2020-03-04 PROCEDURE — 97535 SELF CARE MNGMENT TRAINING: CPT

## 2020-03-04 RX ORDER — LIDOCAINE 4 G/G
1 PATCH TOPICAL DAILY
Status: DISCONTINUED | OUTPATIENT
Start: 2020-03-05 | End: 2020-03-11 | Stop reason: HOSPADM

## 2020-03-04 RX ADMIN — Medication 2000 UNITS: at 08:43

## 2020-03-04 RX ADMIN — DOCUSATE SODIUM 100 MG: 100 CAPSULE, LIQUID FILLED ORAL at 13:33

## 2020-03-04 RX ADMIN — DOCUSATE SODIUM 100 MG: 100 CAPSULE, LIQUID FILLED ORAL at 21:24

## 2020-03-04 RX ADMIN — FUROSEMIDE 40 MG: 40 TABLET ORAL at 08:43

## 2020-03-04 RX ADMIN — HYDROCODONE BITARTRATE AND ACETAMINOPHEN 1 TABLET: 5; 325 TABLET ORAL at 21:23

## 2020-03-04 RX ADMIN — FERROUS SULFATE TAB 325 MG (65 MG ELEMENTAL FE) 325 MG: 325 (65 FE) TAB at 18:35

## 2020-03-04 RX ADMIN — HEPARIN SODIUM 5000 UNITS: 10000 INJECTION INTRAVENOUS; SUBCUTANEOUS at 06:21

## 2020-03-04 RX ADMIN — HYDROCODONE BITARTRATE AND ACETAMINOPHEN 1 TABLET: 5; 325 TABLET ORAL at 06:27

## 2020-03-04 RX ADMIN — NYSTATIN AND TRIAMCINOLONE ACETONIDE: 100000; 1 CREAM TOPICAL at 09:15

## 2020-03-04 RX ADMIN — CARVEDILOL 6.25 MG: 6.25 TABLET, FILM COATED ORAL at 18:35

## 2020-03-04 RX ADMIN — FERROUS SULFATE TAB 325 MG (65 MG ELEMENTAL FE) 325 MG: 325 (65 FE) TAB at 08:48

## 2020-03-04 RX ADMIN — CARVEDILOL 12.5 MG: 6.25 TABLET, FILM COATED ORAL at 08:43

## 2020-03-04 RX ADMIN — ROSUVASTATIN CALCIUM 20 MG: 20 TABLET, FILM COATED ORAL at 21:24

## 2020-03-04 RX ADMIN — NYSTATIN OINTMENT: 100000 OINTMENT TOPICAL at 21:24

## 2020-03-04 RX ADMIN — HYDROCODONE BITARTRATE AND ACETAMINOPHEN 1 TABLET: 5; 325 TABLET ORAL at 10:36

## 2020-03-04 RX ADMIN — HEPARIN SODIUM 5000 UNITS: 10000 INJECTION INTRAVENOUS; SUBCUTANEOUS at 18:35

## 2020-03-04 RX ADMIN — NYSTATIN AND TRIAMCINOLONE ACETONIDE: 100000; 1 CREAM TOPICAL at 22:02

## 2020-03-04 RX ADMIN — DOCUSATE SODIUM 100 MG: 100 CAPSULE, LIQUID FILLED ORAL at 08:43

## 2020-03-04 RX ADMIN — Medication 250 MG: at 21:24

## 2020-03-04 RX ADMIN — CLOPIDOGREL 75 MG: 75 TABLET, FILM COATED ORAL at 08:43

## 2020-03-04 RX ADMIN — ASPIRIN 81 MG 81 MG: 81 TABLET ORAL at 08:43

## 2020-03-04 ASSESSMENT — PAIN DESCRIPTION - PROGRESSION
CLINICAL_PROGRESSION: NOT CHANGED
CLINICAL_PROGRESSION: GRADUALLY IMPROVING
CLINICAL_PROGRESSION: GRADUALLY IMPROVING

## 2020-03-04 ASSESSMENT — PAIN SCALES - GENERAL
PAINLEVEL_OUTOF10: 0
PAINLEVEL_OUTOF10: 10
PAINLEVEL_OUTOF10: 6
PAINLEVEL_OUTOF10: 7
PAINLEVEL_OUTOF10: 6

## 2020-03-04 ASSESSMENT — PAIN DESCRIPTION - DESCRIPTORS
DESCRIPTORS: ACHING;SHOOTING
DESCRIPTORS: ACHING

## 2020-03-04 ASSESSMENT — PAIN DESCRIPTION - PAIN TYPE
TYPE: CHRONIC PAIN
TYPE: CHRONIC PAIN

## 2020-03-04 ASSESSMENT — PAIN DESCRIPTION - ORIENTATION
ORIENTATION: LEFT
ORIENTATION: RIGHT;LEFT

## 2020-03-04 ASSESSMENT — PAIN DESCRIPTION - ONSET
ONSET: ON-GOING
ONSET: ON-GOING

## 2020-03-04 ASSESSMENT — PAIN DESCRIPTION - FREQUENCY
FREQUENCY: CONTINUOUS
FREQUENCY: CONTINUOUS

## 2020-03-04 ASSESSMENT — PAIN DESCRIPTION - LOCATION
LOCATION: KNEE;PELVIS
LOCATION: KNEE

## 2020-03-04 NOTE — PROGRESS NOTES
Occupational Therapy  OCCUPATIONAL THERAPY DAILY NOTE    Date:3/4/2020  Patient Name: Vicky Box  MRN: [id number]  : 1940  Room: [address] [place], [place] [address]     Diagnosis: Pelvic fx- superior & inferior pubic rami fx- R sacral alar fx & subacute fx R L5  Additional Pertinent Hx: OA, HTN, HLD, CKD, CAD, anemia, chronic back pain, pain pump, hx L2 compression fx- stable    Precautions: falls,WBAT RLE, Spinal Precautions     Functional Assessment:   Date Status AE  Comments   Feeding 3/4/20  Mod I      Grooming 3/4/20  Set up  Completed all grooming tasks seated, due to limited standing tolerance   Bathing 3/4/20 UB: SBA (assist to wash back only)  LB: Min/Mod  Assist from B knees down Declines shower Sponge bathing seated, standing only for paul-care. CGA-Min for steadiness with standing, posterior lean  (Pt reports having LH sponge at home)   UB Dressing 3/4/20  Supervision      LB Dressing 3/4/20  Min A socks only  reacher,Sock aide Pt able to lucio undergarment & pants without AE this date. Assist to doff socks, pt reports she only wears slip on shoes, reports she wears compression stockings at home & has assistive device or gets help from her family    Homemaking 3/1/20 CGA/min assist Standing       Functional Transfers / Balance:   Date Status DME  Comments   Sit Balance 3/4/20  Supervision  With dynamic ADL tasks    Stand Balance 3/4/20   CGA/Min ww Slight posterior lean during bathing when standing    [x] Tub  [] Shower   Transfer 3/2/20   Min A   . Commode   Transfer    Toileting  3/4/20      3/4/20   CGA      SBA  W/c to Grab bars Pt requests to sit sideways on commode to allow her to hold onto grab bars?     Pt completed hygiene and clothing management following voiding    Functional   Mobility 3/3/20    CGA ww    Other:sit to stand     Stand pivot        On/off recliner  3/3/20       3/3/20       3/3/20   CGA      CGA       CGA  ww      ww      ww       Functional Exercises / Activity:  BUE strength exercises: robert box with 5 # wt on table top surface 3 sets 10 reps in all planes on table top surface   Yellow can do bar 3 sets 15 reps   Pt completed reacher activity picking up items off of floor while seated in preparation of using reacher for LB dressing      Sensory / Neuromuscular Re-Education:      Cognitive Skills:   Status Comments   Problem   Solving good     Memory good     Sequencing good     Safety fair       Visual Perception:    Education:  Pt was educated on safety & techniques &  energy conservation techniques during ADL's tasks. Educated pt this therapist recommending pt sit forward on commode, instead of sideways with pt reporting she sits sideways because it is easier to use grab bars to pull herself up & she feels safer. Crepitous noted through out B LE joints with functional transfers. Pt reports the mornings being the hardest to move around. [] Family teach completed on:    Pain Level:   4/10 pelvis, pain medication was provided     Additional Notes:   2/23/2020- Pt complained that she is not sleeping well. When pt questioned further pt stated she normally sleeps in a recliner @ home as she is unable to get in or out of bed prior to her admission. 3/4/20 pt requires increased time & effort to complete ADL tasks during sponge bathing tasks. Pt completes minimal areas unless encouraged to wash more areas & thoroughly, likely due to effort required to complete bathing tasks, due to arthritic joints & pain. Discussed with pt likely her needs with assist to complete bathing at home with pt reporting her granddaughters could assist her. Patient has made good  progress during treatment sessions toward set goals.  Therapy emphasis to obtain goals: Current Treatment Recommendations: Pain Management, Positioning, Gait Training, Safety Education & Training, Balance Training, Patient/Caregiver Education & Training, Self-Care / ADL, Functional Mobility Training, Equipment Evaluation, Education, &

## 2020-03-04 NOTE — PROGRESS NOTES
sup Mod I   Stair negotiation: ascended and descended NT 4 steps with L HR cgA/sba (sidewards bilateral UE support) 4 steps with L HR cgA/sba (sidewards bilateral UE support) 4 steps with bilateral rail with Julissa >4 steps with one rail with sba   Curb Step:   ascended and descended NT 2 in curb step with ww sup  NT 4 inch step with AAD and Julissa 4 inch step with AAD and sba   Picking up object off the floor NT       BLE ROM WFL (gross knee flexion/extension limitations due to OA/audible crepitus noted)       BLE Strength R LE: hip 3-/5, knee 3+/5, ankle 4/5    L LE: hip 3+/5, knee 4/5, ankle 4/5       Balance  Sitting: Independent   Standing: min/modA with ww       Date Family Teach Completed TBA       Is additional Family Teaching Needed? Y or N Y       Hindering Progress Pain, OA       PT recommended ELOS 3 weeks       Team's Discharge Plan        Therapist at Team Meeting          Therapeutic Exercise:   AM: 5x STS transfers at w/c x2 reps, no cues for hand placement    Seated LAQ, Hip flexion, AP  2x15 to increase LE strength and ROM for transfers    PM: 5x STS transfers at w/c x2 reps, no cues for hand placement  Curb step negotiation, cues for patterning   Stair negotiation, one HR with sideways approach       Patient education  Pt educated on hand placement with transfers, WBAT patterning with curb/stair negotiation. Patient response to education:   Pt verbalized understanding Pt demonstrated skill Pt requires further education in this area   yes yes  Reinforcement      Additional Comments: Continued to review all functional mobility as tolerated. With fatigue and UE pain, pt requiring occasional verbal cues for postural corrections to prevent excessive trunk flexion and bearing weight on forearms, however reporting this is how she utilized her rollator at home. Trial stair negotiation with one HR with sideways approach using bilateral UE with cgA for safety when descending due to difficulty advance LE's. In PM, continued to progress mobility as tolerated. Frequently ambulating with forward flexed posture despite verbal cues, completing this way prior to admission. Continues to take increased time advancing LE's when descending stairs, however no change in function noted when completing with sideways approach. Will continue to progress as able. AM  Time in: 0915  Time out: 1000    PM  Time in: 1300  Time out: 1345    Pt is making fair progress toward established Physical Therapy goals. Continue with physical therapy current plan of care.     Johnathan Magaña DPT  [id number]

## 2020-03-04 NOTE — PROGRESS NOTES
Progress Note    Subjective/   [de-identified]y.o. year old female on the rehab unit for multiple trauma. Pain controlled a bit better. Complains of hard stool. No SOB or chest pain. No dizziness. No nausea or vomiting. Objective/   VITALS:  BP (!) 157/65   Pulse 77   Temp 98.2 °F (36.8 °C) (Temporal)   Resp 16   Ht 5' 2\" (1.575 m)   Wt 180 lb 4.8 oz (81.8 kg)   SpO2 95%   BMI 32.98 kg/m²   24HR INTAKE/OUTPUT:      Intake/Output Summary (Last 24 hours) at 3/3/2020 2157  Last data filed at 3/3/2020 1700  Gross per 24 hour   Intake 780 ml   Output --   Net 780 ml     Constitutional:  Alert, awake, no apparent distress   Cardiovascular:  S1, S2 without m/r/g   Respiratory:  CTA B without w/r/r   Abdomen: +BS  Ext: no pitting LE edema  Neuro: awake and alert, no tremor, good sitting balance    Functional Level      Date   Status   AE    Comments     Feeding   2/23/20    Mod I              Grooming   2/27/20    SBA             Bathing   2/27/20   Mod A             UB Dressing   2/27/20    Supervision              LB Dressing   3/1/20    Mod Assist    reacher,Sock aide         Homemaking   3/1/20   CGA/min assist   Standing               Functional Transfers / Balance:      Date Status DME  Comments   Sit Balance 3/1/20  Supervision        Stand Balance 3/3/20   CGA ww During toileting    [x]? Tub  []?  Shower   Transfer 3/2/20   Min A    .    Commode   Transfer     toileting  3/3/20        3/3/20   CGA        SBA  Ww, BSC        Pt completed hygiene and clothing management following voiding    Functional   Mobility 3/3/20    CGA ww Short distance with ww    Other:sit to stand         Stand pivot          On/off recliner  3/3/20         3/3/20            3/3/20   CGA        CGA            CGA  ww        ww           ww         Functional Exercises / Activity:  BUE strength exercises: robert box with 8 # wt on table top surface 3 sets 15 reps                                            2 # weighted ball 3 sets 10 reps in all planes of pt's tolerance   B hand strength with 3 # digi flex 3 sets 15 reps   B fine motor coordination activity with pt completing copy design puzzle seated at table top level      Sensory / Neuromuscular Re-Education:        Cognitive Skills:    Status Comments   Problem   Solving good      Memory good      Sequencing good      Safety fair             Scheduled Meds:   docusate sodium  100 mg Oral TID    aspirin  81 mg Oral Daily    carvedilol  12.5 mg Oral QAM    carvedilol  6.25 mg Oral Nightly    clopidogrel  75 mg Oral Daily    ferrous sulfate  325 mg Oral BID    furosemide  40 mg Oral Daily    heparin (porcine)  5,000 Units Subcutaneous Q12H    magnesium gluconate  250 mg Oral Nightly    mineral oil-hydrophilic petrolatum   Topical Daily    nystatin-triamcinolone   Topical BID    rosuvastatin  20 mg Oral Daily    vitamin D  2,000 Units Oral Daily    nystatin   Topical BID     Continuous Infusions:  PRN Meds:HYDROcodone-acetaminophen, magnesium hydroxide, polyethylene glycol  I/O last 3 completed shifts: In: 5 [P.O.:720]  Out: -   I/O this shift:  In: 240 [P.O.:240]  Out: -     Labs reviewed  CBC: No results for input(s): WBC, HGB, PLT in the last 72 hours. BMP:  No results for input(s): NA, K, CL, CO2, BUN, CREATININE, GLUCOSE in the last 72 hours. Hepatic: No results for input(s): AST, ALT, ALB, BILITOT, ALKPHOS in the last 72 hours. BNP: No results for input(s): BNP in the last 72 hours. Lipids: No results for input(s): CHOL, HDL in the last 72 hours. Invalid input(s): LDLCALCU  INR: No results for input(s): INR in the last 72 hours.     Assessment/  Patient Active Problem List:     Fall due to stumbling     Anemia     HTN (hypertension)     OA (osteoarthritis)     Spinal stenosis     Osteoporosis     Chronic pain     Facial contusion     Compression fx, lumbar spine (HCC)     Compression fx, thoracic spine (HCC)     Valvular heart disease     Tachycardia     Closed

## 2020-03-05 PROCEDURE — 97530 THERAPEUTIC ACTIVITIES: CPT

## 2020-03-05 PROCEDURE — 97110 THERAPEUTIC EXERCISES: CPT

## 2020-03-05 PROCEDURE — 1280000000 HC REHAB R&B

## 2020-03-05 PROCEDURE — 6360000002 HC RX W HCPCS: Performed by: INTERNAL MEDICINE

## 2020-03-05 PROCEDURE — 97535 SELF CARE MNGMENT TRAINING: CPT

## 2020-03-05 PROCEDURE — 6370000000 HC RX 637 (ALT 250 FOR IP): Performed by: INTERNAL MEDICINE

## 2020-03-05 PROCEDURE — 6370000000 HC RX 637 (ALT 250 FOR IP): Performed by: PHYSICAL MEDICINE & REHABILITATION

## 2020-03-05 RX ADMIN — FUROSEMIDE 40 MG: 40 TABLET ORAL at 08:18

## 2020-03-05 RX ADMIN — CLOPIDOGREL 75 MG: 75 TABLET, FILM COATED ORAL at 08:18

## 2020-03-05 RX ADMIN — SKIN PROTECTANT: 44 OINTMENT TOPICAL at 08:21

## 2020-03-05 RX ADMIN — ROSUVASTATIN CALCIUM 20 MG: 20 TABLET, FILM COATED ORAL at 21:32

## 2020-03-05 RX ADMIN — HYDROCODONE BITARTRATE AND ACETAMINOPHEN 1 TABLET: 5; 325 TABLET ORAL at 16:22

## 2020-03-05 RX ADMIN — HEPARIN SODIUM 5000 UNITS: 10000 INJECTION INTRAVENOUS; SUBCUTANEOUS at 06:16

## 2020-03-05 RX ADMIN — FERROUS SULFATE TAB 325 MG (65 MG ELEMENTAL FE) 325 MG: 325 (65 FE) TAB at 17:33

## 2020-03-05 RX ADMIN — NYSTATIN AND TRIAMCINOLONE ACETONIDE: 100000; 1 CREAM TOPICAL at 08:21

## 2020-03-05 RX ADMIN — HEPARIN SODIUM 5000 UNITS: 10000 INJECTION INTRAVENOUS; SUBCUTANEOUS at 17:36

## 2020-03-05 RX ADMIN — HYDROCODONE BITARTRATE AND ACETAMINOPHEN 1 TABLET: 5; 325 TABLET ORAL at 08:18

## 2020-03-05 RX ADMIN — Medication 2000 UNITS: at 08:18

## 2020-03-05 RX ADMIN — HYDROCODONE BITARTRATE AND ACETAMINOPHEN 1 TABLET: 5; 325 TABLET ORAL at 12:09

## 2020-03-05 RX ADMIN — ASPIRIN 81 MG 81 MG: 81 TABLET ORAL at 08:27

## 2020-03-05 RX ADMIN — DOCUSATE SODIUM 100 MG: 100 CAPSULE, LIQUID FILLED ORAL at 13:56

## 2020-03-05 RX ADMIN — CARVEDILOL 12.5 MG: 6.25 TABLET, FILM COATED ORAL at 08:19

## 2020-03-05 RX ADMIN — NYSTATIN OINTMENT: 100000 OINTMENT TOPICAL at 08:19

## 2020-03-05 RX ADMIN — DOCUSATE SODIUM 100 MG: 100 CAPSULE, LIQUID FILLED ORAL at 08:19

## 2020-03-05 RX ADMIN — HYDROCODONE BITARTRATE AND ACETAMINOPHEN 1 TABLET: 5; 325 TABLET ORAL at 21:32

## 2020-03-05 RX ADMIN — DOCUSATE SODIUM 100 MG: 100 CAPSULE, LIQUID FILLED ORAL at 21:32

## 2020-03-05 RX ADMIN — FERROUS SULFATE TAB 325 MG (65 MG ELEMENTAL FE) 325 MG: 325 (65 FE) TAB at 08:18

## 2020-03-05 RX ADMIN — Medication 250 MG: at 21:32

## 2020-03-05 RX ADMIN — CARVEDILOL 6.25 MG: 6.25 TABLET, FILM COATED ORAL at 17:34

## 2020-03-05 ASSESSMENT — PAIN SCALES - GENERAL
PAINLEVEL_OUTOF10: 4
PAINLEVEL_OUTOF10: 8
PAINLEVEL_OUTOF10: 5
PAINLEVEL_OUTOF10: 6
PAINLEVEL_OUTOF10: 5
PAINLEVEL_OUTOF10: 7
PAINLEVEL_OUTOF10: 4
PAINLEVEL_OUTOF10: 5

## 2020-03-05 ASSESSMENT — PAIN DESCRIPTION - LOCATION
LOCATION: PELVIS
LOCATION: GROIN;HIP

## 2020-03-05 ASSESSMENT — PAIN DESCRIPTION - PAIN TYPE
TYPE: ACUTE PAIN

## 2020-03-05 ASSESSMENT — PAIN DESCRIPTION - FREQUENCY
FREQUENCY: CONTINUOUS

## 2020-03-05 ASSESSMENT — PAIN DESCRIPTION - DESCRIPTORS
DESCRIPTORS: ACHING

## 2020-03-05 ASSESSMENT — PAIN DESCRIPTION - ORIENTATION
ORIENTATION: RIGHT

## 2020-03-05 NOTE — PROGRESS NOTES
Physical Therapy    Facility/Department: [facility] 5S REHAB  Weekly Progress Note     NAME: Pierce Strauss  : 1940  MRN: [id number]    Date of Service: 3/5/2020    Evaluating Therapist: Ramona Quiñones DPT    ROOM: [facility]/1711-D  DIAGNOSIS: multiple trauma  PMH: To ED on 20 for back/hip pain. X-ray right hip/CT lumbar spine: Fracture about the superior and inferior pubic rami on the right as well as right sacral alar fracture (Right LC 1 pelvic ring injury). Noted Subacute Fx R L5 Transverse Process. Chronic Partly Healed R L5 Pars Fx and Chronic Stable L2 Compression Fx PMH includes anemia, HTN, OA, osteoporosis, CABG x2, valvular heart disease. PRECAUTIONS: WBAT RLE, Falls, Spinal Neutral,     Pt lives alone in a 1 story home with 3 stairs to enter with a L HR. Bed is on first floor and bath is on first floor. Pt ambulated with rollator independently PTA. Equipment Owned: [employer]; [employer]. Pt reporting shuffled patterning with ambulation PTA. Pt reporting her son lives next door and assists PRN. Initial Evaluation  2/21/20 2/27/20 3/5/20 Comments Short Term Goals Long Term Goals    Bed Mobility  Rolling: Julissa  Supine to sit: Julissa  Sit to supine: modA  Scooting: Julissa Rolling: sba  Supine to sit: sba/sup  Sit to supine: cg/sba  Scooting: sba/sup Rolling: mod I  Supine to sit: mod I   Sit to supine: sba/sup  Scooting: mod I Limited by pain     ** pt sleeps in a recliner at home ** sup Mod I   Transfers Sit to stand: min/modA  Stand to sit: min/modA  Stand pivot: min/modA with ww Sit to stand: sba  Stand to sit: sba  Stand pivot: cgA/sba with ww Sit to stand: mod I  Stand to sit: sup  Stand pivot: sup with ww  sba with AAD Mod I with AAD   Ambulation    8 feet with ww with Julissa 30-45' feet with ww with sba 150' with ww sup    150' with rollator sup  At times, wear bearing on forearms, forward flexed posture.  Reciprocal patterning and improving speed 50 feet with AAD with sba >150 feet with AAD with mod I   Wheel Chair Mobility 76' with bilateral UE sba  NT NT  Car Transfers NT Julissa sup  sup Mod I   Stair negotiation: ascended and descended NT 4 steps with bilateral HR cg/Julissa 4 steps with L HR sba (sidewards bilateral UE support) Difficulty when descending advancing LE's 4 steps with bilateral rail with Julissa >4 steps with one rail with sba   Curb Step:   ascended and descended NT 2 in curb step with ww cgA 2 in curb step with rollator sup  4 inch step with AAD and Julissa 4 inch step with AAD and sba   BLE ROM WFL (gross knee flexion/extension limitations due to OA/audible crepitus noted) WFL (gross knee flexion/extension limitations due to OA/audible crepitus noted) WFL (limited knee flexion/extension due to OA/crepitus)       BLE Strength R LE: hip 3-/5, knee 3+/5, ankle 4/5    L LE: hip 3+/5, knee 4/5, ankle 4/5 R LE: hip 3-/5, knee 3+/5, ankle 4/5    L LE: hip 3+/5, knee 4/5, ankle 4/5 R LE: hip 3-/5, knee 4-/5, ankle 4/5    L LE: hip 3+/5, knee 4/5, ankle 4/5      Balance  Sitting: Independent   Standing: min/modA with ww Sitting: Independent   Standing: cg/sba with ww Sitting: Independent   Standing: sba/sup with ww vs rollator       Date Family Teach Completed TBA TBA TBA      Is additional Family Teaching Needed? Y or N Y Y prior to d/c Y prior to d/c      Hindering Progress Pain, OA Pain, OA Pain, OA      PT recommended ELOS 3 weeks 10-14 days 3 days      Team's Discharge Plan  2 weeks Discharge Wednesday 3/11/20      Therapist at Team Meeting  Rc Oakes PT, DPT [id number]   Rc Oakes PT, DPT [id number]          Date:  3/5/20  Supporting factors: Motivated, improving functional mobility, reducing pain   Barriers to discharge:  Pain, OA  Additional comments:  Utilizing both ww and rollator. Pt at times using forearms to bear weight (completed this way prior to hospitalization) due to wrist/hand discomfort from OA. Progressing towards all goals.    DME:  ww vs rollator (function is the same)  After Care:

## 2020-03-05 NOTE — PROGRESS NOTES
Occupational Therapy  OCCUPATIONAL THERAPY DAILY NOTE    Date:3/5/2020  Patient Name: Hesham Mehta  MRN: [id number]  : 1940  Room: [address] [place], [place] [address]     Diagnosis: Pelvic fx- superior & inferior pubic rami fx- R sacral alar fx & subacute fx R L5  Additional Pertinent Hx: OA, HTN, HLD, CKD, CAD, anemia, chronic back pain, pain pump, hx L2 compression fx- stable    Precautions: falls,WBAT RLE, Spinal Precautions     Functional Assessment:   Date Status AE  Comments   Feeding 3/4/20  Mod I      Grooming 3/4/20  Set up     Bathing 3/4/20 UB: SBA (assist to wash back only)  LB: Min/Mod  Assist from B knees down Declines shower    UB Dressing 3/4/20  Supervision      LB Dressing 3/5/20  SBA   Pt donned slip on shoes    Homemaking 3/5/20  SBA  Standing  Standing at table top level folding laundry      Functional Transfers / Balance:   Date Status DME  Comments   Sit Balance 3/4/20  Supervision     Stand Balance 3/5/20  SBA ww At table top level    [x] Tub  [] Shower   Transfer 3/2/20   Min A      Commode   Transfer    Toileting  3/5/20       3/5/20   CGA      SBA  W/c to Grab bars    Functional   Mobility 3/5/20  CGA ww Short distance in pt's room.  Pt declined going any further due to pain    Other:sit to stand     Stand pivot        On/off recliner  3/5/20       3/5/20        3/3/20   CGA      CGA       CGA  ww      ww      ww       Functional Exercises / Activity:   BUE strength exercises: wheel and putty on table top surface                                             2 # weighted ball 3 sets 10 reps in all planes     B hand strength with 5 # digi flex 3 sets 15 reps      Sensory / Neuromuscular Re-Education:      Cognitive Skills:   Status Comments   Problem   Solving good     Memory good     Sequencing good     Safety fair       Visual Perception:    Education:   pt was educated on pacing and energy conservation techniques to utilize during ADL's     [] Family teach completed on:    Pain Level:   5/10 pelvis, pain medication was provided     Additional Notes:   2/23/2020- Pt complained that she is not sleeping well. When pt questioned further pt stated she normally sleeps in a recliner @ home as she is unable to get in or out of bed prior to her admission. 3/4/20 pt requires increased time & effort to complete ADL tasks during sponge bathing tasks. Pt completes minimal areas unless encouraged to wash more areas & thoroughly, likely due to effort required to complete bathing tasks, due to arthritic joints & pain. Discussed with pt likely her needs with assist to complete bathing at home with pt reporting her granddaughters could assist her. Patient has made good  progress during treatment sessions toward set goals. Therapy emphasis to obtain goals: Current Treatment Recommendations: Pain Management, Positioning, Gait Training, Safety Education & Training, Balance Training, Patient/Caregiver Education & Training, Self-Care / ADL, Functional Mobility Training, Equipment Evaluation, Education, & procurement, Home Management Training, Endurance Training     [x] Continue with current OT Plan of care.   [] Prepare for Discharge     DISCHARGE RECOMMENDATIONS  Recommended DME:  [facility]     Post Discharge Care:   []Home Independently  []Home with 24hr Care / Supervision []Home with Partial Supervision []Home with Home Health OT []Home with Out Pt OT []Other: ___   Comments:         Time in Time out Tx Time Breakdown  Variance:   First Session  [id number]  [] Individual Tx-   [x] Concurrent Tx-  [] Co-Tx -   [] Group Tx -   [] Time Missed -     Second Session [id number]   [] Individual Tx-   [x] Concurrent Tx -45  [] Co-Tx -   [] Group Tx -   [] Time Missed -     Third Session    [] Individual Tx-   [] Concurrent Tx -  [] Co-Tx -   [] Group Tx -   [] Time Missed -         Total Tx Time:  90 mins      Lala Snow OTR/L [id number]

## 2020-03-05 NOTE — PLAN OF CARE
Problem: Falls - Risk of:  Goal: Will remain free from falls  Description  Will remain free from falls  3/5/2020 1727 by Mary Lora RN  Outcome: Met This Shift  3/5/2020 1649 by Leo Paul RN  Outcome: Met This Shift  3/5/2020 1648 by Leo Paul RN  Outcome: Met This Shift     Problem: Pain:  Goal: Pain level will decrease  Description  Pain level will decrease  3/5/2020 1727 by Mary Lora RN  Outcome: Ongoing  3/5/2020 1649 by Leo Paul RN  Outcome: Met This Shift  3/5/2020 1648 by Leo Paul RN  Outcome: Met This Shift

## 2020-03-05 NOTE — PROGRESS NOTES
Nutrition Assessment (Low Risk)    Type and Reason for Visit: Reassess    Nutrition Recommendations: Continue current diet and Ensure HP BID and will monitor    Nutrition Assessment:  Patient assessed for nutritional risk. Deemed to be at low risk at this time. Will continue to monitor for changes in status. Pt reports ongoing good appetite and PO intakes. Will continue current diet and ONS and monitor.     Malnutrition Assessment:  · Malnutrition Status: No malnutrition    Nutrition Risk Level   Risk Level: Low    Nutrition Diagnosis:   · Problem: No nutrition diagnosis at this time    Nutrition Intervention:  Food and/or Delivery: Continue current diet, Continue current ONS  Nutrition Education/Counseling/Coordination of Care:  Continued Inpatient Monitoring, No recommendations at this time      Electronically signed by Rosendo Cabrera, MS, RD, LD on 3/5/20 at 2:48 PM    Contact Number: [phone]

## 2020-03-05 NOTE — PROGRESS NOTES
bilateral UE sup  >150' with bilateral UE mod I   Car Transfers NT NT NT  sup Mod I   Stair negotiation: ascended and descended NT 4 steps with L HR cgA/sba (sidewards bilateral UE support) 4 steps with L HR sba (sidewards bilateral UE support) 4 steps with bilateral rail with Julissa >4 steps with one rail with sba   Curb Step:   ascended and descended NT 2 in curb step with rollator sup  NT 4 inch step with AAD and Julissa 4 inch step with AAD and sba   Picking up object off the floor NT       BLE ROM WFL (gross knee flexion/extension limitations due to OA/audible crepitus noted)       BLE Strength R LE: hip 3-/5, knee 3+/5, ankle 4/5    L LE: hip 3+/5, knee 4/5, ankle 4/5       Balance  Sitting: Independent   Standing: min/modA with ww       Date Family Teach Completed TBA       Is additional Family Teaching Needed? Y or N Y       Hindering Progress Pain, OA       PT recommended ELOS 3 weeks       Team's Discharge Plan        Therapist at Team Meeting          Therapeutic Exercise:   AM: 5x STS transfers at w/c x2 reps, no cues for hand placement    Seated LAQ, Hip flexion, AP  2x15 to increase LE strength and ROM for transfers    PM: 5x STS transfers at w/c x2 reps, no cues for hand placement  Curb step negotiation, cues for patterning   Stair negotiation, one HR with sideways approach       Patient education  Pt educated on hand placement with transfers, WBAT patterning with curb/stair negotiation. Patient response to education:   Pt verbalized understanding Pt demonstrated skill Pt requires further education in this area   yes yes  Reinforcement      Additional Comments: Reviewed all mobility as per above. Utilizing rollator as per home use, elevated higher than normal to allow for weight bearing through her forearms. Step through patterning noted t/o session, with occasional rest breaks due to UE fatigue. Continues to require increased time for mobility with rest breaks to complete.  In PM, continued to progress mobility. Utilizing ww in PM with bilateral weight bearing on forearms and through hands t/o ambulation. Will continue to attempt to progress towards rollator. AM  Time in: 0915  Time out: 1000    PM  Time in: 1300  Time out: 1345    Pt is making fair progress toward established Physical Therapy goals. Continue with physical therapy current plan of care.     Darling Gates, DPT  [id number]

## 2020-03-05 NOTE — PROGRESS NOTES
UE support) 4 steps with bilateral rail with Julissa >4 steps with one rail with sba   Curb Step:   ascended and descended NT 2 in curb step with ww sup  NT 4 inch step with AAD and Julissa 4 inch step with AAD and sba   Picking up object off the floor NT           BLE ROM WFL (gross knee flexion/extension limitations due to OA/audible crepitus noted)           BLE Strength R LE: hip 3-/5, knee 3+/5, ankle 4/5     L LE: hip 3+/5, knee 4/5, ankle 4/5           Balance  Sitting: Independent   Standing: min/modA with ww                 Scheduled Meds:   [START ON 3/5/2020] lidocaine  1 patch Transdermal Daily    docusate sodium  100 mg Oral TID    aspirin  81 mg Oral Daily    carvedilol  12.5 mg Oral QAM    carvedilol  6.25 mg Oral Nightly    clopidogrel  75 mg Oral Daily    ferrous sulfate  325 mg Oral BID    furosemide  40 mg Oral Daily    heparin (porcine)  5,000 Units Subcutaneous Q12H    magnesium gluconate  250 mg Oral Nightly    mineral oil-hydrophilic petrolatum   Topical Daily    nystatin-triamcinolone   Topical BID    rosuvastatin  20 mg Oral Daily    vitamin D  2,000 Units Oral Daily    nystatin   Topical BID     Continuous Infusions:  PRN Meds:HYDROcodone-acetaminophen, magnesium hydroxide, polyethylene glycol  I/O last 3 completed shifts: In: 65 [P.O.:660]  Out: -   I/O this shift:  In: 240 [P.O.:240]  Out: -     Labs reviewed  CBC: No results for input(s): WBC, HGB, PLT in the last 72 hours. BMP:  No results for input(s): NA, K, CL, CO2, BUN, CREATININE, GLUCOSE in the last 72 hours. Hepatic: No results for input(s): AST, ALT, ALB, BILITOT, ALKPHOS in the last 72 hours. BNP: No results for input(s): BNP in the last 72 hours. Lipids: No results for input(s): CHOL, HDL in the last 72 hours. Invalid input(s): LDLCALCU  INR: No results for input(s): INR in the last 72 hours.     Assessment/  Patient Active Problem List:     Fall due to stumbling     Anemia     HTN (hypertension)     OA (osteoarthritis)     Spinal stenosis     Osteoporosis     Chronic pain     Facial contusion     Compression fx, lumbar spine (HCC)     Compression fx, thoracic spine (HCC)     Valvular heart disease     Tachycardia     Closed fracture of nasal bone     Acute kidney injury ([facility] [address]     Acute renal failure (ARF) ([facility])     Closed fracture of sacrum, initial encounter ([facility])     DDD (degenerative disc disease), lumbar     Hypoalbuminemia     Unable to ambulate     Physical deconditioning     CKD (chronic kidney disease)     History of aortic valve replacement     Multiple fracture      Plan/  1. Continue rehab program  2. Increase activity as able  3. Continue dvt prophylaxis  4. Continue pain control - trial of lidocaine to knee  5.  Continue plan for home soon          Nas Mondragon MD

## 2020-03-06 PROCEDURE — 97110 THERAPEUTIC EXERCISES: CPT

## 2020-03-06 PROCEDURE — 6360000002 HC RX W HCPCS: Performed by: INTERNAL MEDICINE

## 2020-03-06 PROCEDURE — 6370000000 HC RX 637 (ALT 250 FOR IP): Performed by: PHYSICAL MEDICINE & REHABILITATION

## 2020-03-06 PROCEDURE — 6370000000 HC RX 637 (ALT 250 FOR IP): Performed by: INTERNAL MEDICINE

## 2020-03-06 PROCEDURE — 97530 THERAPEUTIC ACTIVITIES: CPT

## 2020-03-06 PROCEDURE — 1280000000 HC REHAB R&B

## 2020-03-06 RX ADMIN — CLOPIDOGREL 75 MG: 75 TABLET, FILM COATED ORAL at 09:57

## 2020-03-06 RX ADMIN — FERROUS SULFATE TAB 325 MG (65 MG ELEMENTAL FE) 325 MG: 325 (65 FE) TAB at 18:24

## 2020-03-06 RX ADMIN — NYSTATIN OINTMENT: 100000 OINTMENT TOPICAL at 20:51

## 2020-03-06 RX ADMIN — CARVEDILOL 12.5 MG: 6.25 TABLET, FILM COATED ORAL at 08:28

## 2020-03-06 RX ADMIN — FERROUS SULFATE TAB 325 MG (65 MG ELEMENTAL FE) 325 MG: 325 (65 FE) TAB at 08:27

## 2020-03-06 RX ADMIN — CARVEDILOL 6.25 MG: 6.25 TABLET, FILM COATED ORAL at 18:25

## 2020-03-06 RX ADMIN — HYDROCODONE BITARTRATE AND ACETAMINOPHEN 1 TABLET: 5; 325 TABLET ORAL at 09:22

## 2020-03-06 RX ADMIN — HYDROCODONE BITARTRATE AND ACETAMINOPHEN 1 TABLET: 5; 325 TABLET ORAL at 13:52

## 2020-03-06 RX ADMIN — DOCUSATE SODIUM 100 MG: 100 CAPSULE, LIQUID FILLED ORAL at 08:27

## 2020-03-06 RX ADMIN — Medication 250 MG: at 20:51

## 2020-03-06 RX ADMIN — ROSUVASTATIN CALCIUM 20 MG: 20 TABLET, FILM COATED ORAL at 20:51

## 2020-03-06 RX ADMIN — NYSTATIN OINTMENT: 100000 OINTMENT TOPICAL at 08:37

## 2020-03-06 RX ADMIN — NYSTATIN AND TRIAMCINOLONE ACETONIDE: 100000; 1 CREAM TOPICAL at 08:36

## 2020-03-06 RX ADMIN — Medication 2000 UNITS: at 08:28

## 2020-03-06 RX ADMIN — HEPARIN SODIUM 5000 UNITS: 10000 INJECTION INTRAVENOUS; SUBCUTANEOUS at 05:09

## 2020-03-06 RX ADMIN — DOCUSATE SODIUM 100 MG: 100 CAPSULE, LIQUID FILLED ORAL at 20:51

## 2020-03-06 RX ADMIN — SKIN PROTECTANT: 44 OINTMENT TOPICAL at 08:35

## 2020-03-06 RX ADMIN — HYDROCODONE BITARTRATE AND ACETAMINOPHEN 1 TABLET: 5; 325 TABLET ORAL at 18:24

## 2020-03-06 RX ADMIN — HEPARIN SODIUM 5000 UNITS: 10000 INJECTION INTRAVENOUS; SUBCUTANEOUS at 18:25

## 2020-03-06 RX ADMIN — FUROSEMIDE 40 MG: 40 TABLET ORAL at 09:57

## 2020-03-06 RX ADMIN — DOCUSATE SODIUM 100 MG: 100 CAPSULE, LIQUID FILLED ORAL at 13:52

## 2020-03-06 RX ADMIN — ASPIRIN 81 MG 81 MG: 81 TABLET ORAL at 08:27

## 2020-03-06 RX ADMIN — NYSTATIN AND TRIAMCINOLONE ACETONIDE: 100000; 1 CREAM TOPICAL at 20:52

## 2020-03-06 RX ADMIN — HYDROCODONE BITARTRATE AND ACETAMINOPHEN 1 TABLET: 5; 325 TABLET ORAL at 05:08

## 2020-03-06 ASSESSMENT — PAIN SCALES - GENERAL
PAINLEVEL_OUTOF10: 0
PAINLEVEL_OUTOF10: 7
PAINLEVEL_OUTOF10: 7
PAINLEVEL_OUTOF10: 8
PAINLEVEL_OUTOF10: 5
PAINLEVEL_OUTOF10: 2
PAINLEVEL_OUTOF10: 8

## 2020-03-06 ASSESSMENT — PAIN DESCRIPTION - PAIN TYPE
TYPE: ACUTE PAIN
TYPE: ACUTE PAIN

## 2020-03-06 ASSESSMENT — PAIN DESCRIPTION - ONSET: ONSET: AWAKENED FROM SLEEP

## 2020-03-06 ASSESSMENT — PAIN DESCRIPTION - PROGRESSION: CLINICAL_PROGRESSION: GRADUALLY IMPROVING

## 2020-03-06 ASSESSMENT — PAIN DESCRIPTION - LOCATION
LOCATION: BACK;PELVIS
LOCATION: PELVIS;BACK

## 2020-03-06 ASSESSMENT — PAIN DESCRIPTION - FREQUENCY
FREQUENCY: CONTINUOUS
FREQUENCY: CONTINUOUS

## 2020-03-06 ASSESSMENT — PAIN DESCRIPTION - DESCRIPTORS
DESCRIPTORS: ACHING;DISCOMFORT
DESCRIPTORS: ACHING;DISCOMFORT;SORE

## 2020-03-06 ASSESSMENT — PAIN DESCRIPTION - ORIENTATION
ORIENTATION: RIGHT;LOWER;MID
ORIENTATION: RIGHT;MID;LOWER

## 2020-03-06 ASSESSMENT — PAIN - FUNCTIONAL ASSESSMENT: PAIN_FUNCTIONAL_ASSESSMENT: PREVENTS OR INTERFERES SOME ACTIVE ACTIVITIES AND ADLS

## 2020-03-06 NOTE — PROGRESS NOTES
with bilateral rail with Julissa >4 steps with one rail with sba   Curb Step:   ascended and descended NT 2 in curb step with ww sup   4 inch step with AAD and Julissa 4 inch step with AAD and sba   Picking up object off the floor NT       BLE ROM WFL (gross knee flexion/extension limitations due to OA/audible crepitus noted)       BLE Strength R LE: hip 3-/5, knee 3+/5, ankle 4/5    L LE: hip 3+/5, knee 4/5, ankle 4/5       Balance  Sitting: Independent   Standing: min/modA with ww       Date Family Teach Completed TBA FT with son/granddtr 3/6/20      Is additional Family Teaching Needed? Y or N Y N      Hindering Progress Pain, OA       PT recommended ELOS 3 weeks       Team's Discharge Plan        Therapist at Team Meeting          Therapeutic Exercise:   AM: 5x STS transfers at w/c x2 reps, no cues for hand placement    Car transfer  Toilet transfer, no assistance with lower body dressing/self care      Patient education  Pt educated on hand placement with transfers, WBAT patterning with curb/stair negotiation. Patient response to education:   Pt verbalized understanding Pt demonstrated skill Pt requires further education in this area   yes yes  Reinforcement      Additional Comments: Reviewed all mobility as per above. Son/granddtr present for FT. Educated on proper WBAT patterning and technique with curb/stair negotiation, however pt completing with R LE up and L LE down due to L knee ROM and crepitus. Continues to be limited by pain/OA/crepitus requiring increased time to complete all mobility. No questions or concerns at the end of the session. Educated that patients functional mobility is similar with ww and rollator at this time. AM  Time in: 0915  Time out: 1000      Pt is making fair progress toward established Physical Therapy goals. Continue with physical therapy current plan of care.     Susie Lucas, HEIDYT  [id number]  (AM)

## 2020-03-06 NOTE — PROGRESS NOTES
Progress Note    Subjective/   [de-identified]y.o. year old female on the rehab unit for pelvic fracture. She is reporting some increased pain in her pelvis today. Tolerating therapy program. No SOB or chest pain. No dizziness. Tolerating current medications. Objective/   VITALS:  BP (!) 142/62   Pulse 66   Temp 98.6 °F (37 °C) (Oral)   Resp 16   Ht 5' 2\" (1.575 m)   Wt 180 lb 4.8 oz (81.8 kg)   SpO2 95%   BMI 32.98 kg/m²   24HR INTAKE/OUTPUT:      Intake/Output Summary (Last 24 hours) at 3/5/2020 2212  Last data filed at 3/5/2020 1739  Gross per 24 hour   Intake 1080 ml   Output --   Net 1080 ml     Constitutional:  Alert, awake, no apparent distress   Cardiovascular:  S1, S2 with quiet systolic murmur  Respiratory:  CTA B without w/r/r   Abdomen: +BS, protuberant  Ext: moderate (R) LE edema > left. No calf tenderness. Alignment good  Neuro: awake and alert, good sitting balance    Functional Level      Date   Status   AE    Comments     Feeding   3/4/20    Mod I              Grooming   3/4/20    Set up             Bathing   3/4/20   UB: SBA (assist to wash back only)    LB: Min/Mod  Assist from B knees down Declines shower     UB Dressing   3/4/20    Supervision              LB Dressing   3/5/20    SBA        Pt donned slip on shoes      Homemaking   3/5/20    SBA    Standing    Standing at table top level folding laundry           Functional Transfers / Balance:      Date Status DME  Comments   Sit Balance 3/4/20  Supervision       Stand Balance 3/5/20  SBA ww At table top level    [x]? Tub  []? Shower   Transfer 3/2/20   Min A        Commode   Transfer     Toileting  3/5/20         3/5/20   CGA        SBA  W/c to Grab bars     Functional   Mobility 3/5/20  CGA ww Short distance in pt's room.  Pt declined going any further due to pain    Other:sit to stand      Stand pivot          On/off recliner  3/5/20         3/5/20          3/3/20   CGA        CGA         CGA  ww        ww        ww       Functional Exercises / Activity:   BUE strength exercises: wheel and putty on table top surface                                             2 # weighted ball 3 sets 10 reps in all planes      B hand strength with 5 # digi flex 3 sets 15 reps      Sensory / Neuromuscular Re-Education:        Cognitive Skills:    Status Comments   Problem   Solving good      Memory good      Sequencing good      Safety fair             Scheduled Meds:   lidocaine  1 patch Transdermal Daily    docusate sodium  100 mg Oral TID    aspirin  81 mg Oral Daily    carvedilol  12.5 mg Oral QAM    carvedilol  6.25 mg Oral Nightly    clopidogrel  75 mg Oral Daily    ferrous sulfate  325 mg Oral BID    furosemide  40 mg Oral Daily    heparin (porcine)  5,000 Units Subcutaneous Q12H    magnesium gluconate  250 mg Oral Nightly    mineral oil-hydrophilic petrolatum   Topical Daily    nystatin-triamcinolone   Topical BID    rosuvastatin  20 mg Oral Daily    vitamin D  2,000 Units Oral Daily    nystatin   Topical BID     Continuous Infusions:  PRN Meds:HYDROcodone-acetaminophen, magnesium hydroxide, polyethylene glycol  I/O last 3 completed shifts: In: 12 [P.O.:960]  Out: -   I/O this shift:  In: 360 [P.O.:360]  Out: -     Labs reviewed  CBC: No results for input(s): WBC, HGB, PLT in the last 72 hours. BMP:  No results for input(s): NA, K, CL, CO2, BUN, CREATININE, GLUCOSE in the last 72 hours. Hepatic: No results for input(s): AST, ALT, ALB, BILITOT, ALKPHOS in the last 72 hours. BNP: No results for input(s): BNP in the last 72 hours. Lipids: No results for input(s): CHOL, HDL in the last 72 hours. Invalid input(s): LDLCALCU  INR: No results for input(s): INR in the last 72 hours.     Assessment/  Patient Active Problem List:     Fall due to stumbling     Anemia     HTN (hypertension)     OA (osteoarthritis)     Spinal stenosis     Osteoporosis     Chronic pain     Facial contusion     Compression fx, lumbar spine ([facility] [address]

## 2020-03-06 NOTE — PROGRESS NOTES
HR cgA/sba (sidewards bilateral UE support) 4 steps with L HR sba (sidewards bilateral UE support) 4 steps with bilateral rail with Julissa >4 steps with one rail with sba   Curb Step:   ascended and descended NT 2 in curb step with rollator sup  NT 4 inch step with AAD and Julissa 4 inch step with AAD and sba   Picking up object off the floor NT           BLE ROM WFL (gross knee flexion/extension limitations due to OA/audible crepitus noted)           BLE Strength R LE: hip 3-/5, knee 3+/5, ankle 4/5     L LE: hip 3+/5, knee 4/5, ankle 4/5           Balance  Sitting: Independent   Standing: min/modA with ww                 Scheduled Meds:   lidocaine  1 patch Transdermal Daily    docusate sodium  100 mg Oral TID    aspirin  81 mg Oral Daily    carvedilol  12.5 mg Oral QAM    carvedilol  6.25 mg Oral Nightly    clopidogrel  75 mg Oral Daily    ferrous sulfate  325 mg Oral BID    furosemide  40 mg Oral Daily    heparin (porcine)  5,000 Units Subcutaneous Q12H    magnesium gluconate  250 mg Oral Nightly    mineral oil-hydrophilic petrolatum   Topical Daily    nystatin-triamcinolone   Topical BID    rosuvastatin  20 mg Oral Daily    vitamin D  2,000 Units Oral Daily    nystatin   Topical BID     Continuous Infusions:  PRN Meds:HYDROcodone-acetaminophen, magnesium hydroxide, polyethylene glycol  I/O last 3 completed shifts: In: 1080 [P.O.:1080]  Out: -   No intake/output data recorded. Labs reviewed  CBC: No results for input(s): WBC, HGB, PLT in the last 72 hours. BMP:  No results for input(s): NA, K, CL, CO2, BUN, CREATININE, GLUCOSE in the last 72 hours. Hepatic: No results for input(s): AST, ALT, ALB, BILITOT, ALKPHOS in the last 72 hours. BNP: No results for input(s): BNP in the last 72 hours. Lipids: No results for input(s): CHOL, HDL in the last 72 hours. Invalid input(s): LDLCALCU  INR: No results for input(s): INR in the last 72 hours.     Assessment/  Patient Active Problem List:     Fall due to stumbling     Anemia     HTN (hypertension)     OA (osteoarthritis)     Spinal stenosis     Osteoporosis     Chronic pain     Facial contusion     Compression fx, lumbar spine (HCC)     Compression fx, thoracic spine ([facility])     Valvular heart disease     Tachycardia     Closed fracture of nasal bone     Acute kidney injury ([facility] [address]     Acute renal failure (ARF) ([facility])     Closed fracture of sacrum, initial encounter ([facility])     DDD (degenerative disc disease), lumbar     Hypoalbuminemia     Unable to ambulate     Physical deconditioning     CKD (chronic kidney disease)     History of aortic valve replacement     Multiple fracture      Plan/  1. Continue pain control  2. See team conference note - LASHAWN 3/11/20 home health PT, OT, Nursing, Aide  3. Continue current medications  4. Recheck labs tomorrow  5.  Continue lidocaine          Zan Oneill MD

## 2020-03-06 NOTE — PROGRESS NOTES
Physical Therapy    Facility/Department: [facility] REH[facility]  Treatment Note     NAME: Lashanda Garcia  : 1940  MRN: [id number]    Date of Service: 3/6/2020    Evaluating Therapist: Soraya Lindsay DPT    ROOM: [address] [place] [address]  DIAGNOSIS: multiple trauma  PMH: To ED on 20 for back/hip pain. X-ray right hip/CT lumbar spine: Fracture about the superior and inferior pubic rami on the right as well as right sacral alar fracture (Right LC 1 pelvic ring injury). Noted Subacute Fx R L5 Transverse Process. Chronic Partly Healed R L5 Pars Fx and Chronic Stable L2 Compression Fx PMH includes anemia, HTN, OA, osteoporosis, CABG x2, valvular heart disease. PRECAUTIONS: WBAT RLE, Falls, Spinal Neutral,     Pt lives alone in a 1 story home with 3 stairs to enter with a L HR. Bed is on first floor and bath is on first floor. Pt ambulated with rollator independently PTA. Equipment Owned: [employer]; Rollator. Pt reporting shuffled patterning with ambulation PTA. Pt reporting her son lives next door and assists PRN. Initial Evaluation  20 AM PM Short Term Goals Long Term Goals    Was pt agreeable to Eval/treatment? yes NT yes     Does pt have pain?  3/10 right groin at rest  8/10 right groin with activty   6/10 R groin/thigh pain        Bed Mobility  Rolling: Julissa  Supine to sit: Julissa  Sit to supine: modA  Scooting: Julissa  NT sup Mod I   Transfers Sit to stand: min/modA  Stand to sit: min/modA  Stand pivot: min/modA with ww  Sit to stand: mod I  Stand to sit: mod I   Stand pivot: supervision  with ww sba with AAD Mod I with AAD   Ambulation    8 feet with ww with Julissa  150 feet and 75 feet  with ww with mod I   50 feet with AAD with sba >150 feet with AAD with mod I   Walking 10 feet on uneven surface NT       Wheel Chair Mobility 76' with bilateral UE sba    50 with bilateral UE sup  >150' with bilateral UE mod I   Car Transfers NT  NT  sup Mod I   Stair negotiation: ascended and descended NT    Nt 4 steps with bilateral rail with Julissa >4 steps with one rail with sba   Curb Step:   ascended and descended NT  NT 4 inch step with AAD and Julissa 4 inch step with AAD and sba   Picking up object off the floor NT       BLE ROM WFL (gross knee flexion/extension limitations due to OA/audible crepitus noted)       BLE Strength R LE: hip 3-/5, knee 3+/5, ankle 4/5    L LE: hip 3+/5, knee 4/5, ankle 4/5       Balance  Sitting: Independent   Standing: min/modA with ww       Date Family Teach Completed TBA       Is additional Family Teaching Needed? Y or N Y       Hindering Progress Pain, OA       PT recommended ELOS 3 weeks       Team's Discharge Plan        Therapist at Team Meeting          Therapeutic Exercise:   PM: 5x STS transfers at w/c x2 reps, no cues for hand placement    Seated LAQ, Hip flexion, AP  2x15 to increase LE strength and ROM for transfers  Patient education  Pt educated on hand placement with transfers    Patient response to education:   Pt verbalized understanding Pt demonstrated skill Pt requires further education in this area   x x  x     Additional Comments: Increased time with all mobility. Pt reported shoulder soreness with ambulation with wheeled walker. PM  Time in: 1300  Time out: 1345    Pt is making fair progress toward established Physical Therapy goals. Continue with physical therapy current plan of care.     Chad Ordoñez [id number]

## 2020-03-06 NOTE — PATIENT CARE CONFERENCE
[address] [place] [address]  ACUTE REHABILITATION  TEAM CONFERENCE NOTE/PATIENT PLAN OF CARE    Date: 3/6/2020  Admission date: 2020  Patient Name: Johanna Noguera        MRN: [id number]    : 1940  ([de-identified] y.o.)  Gender: female   Rehab diagnosis/surgery with date:20 right sacral ala fracture, right superior pubic rami fracture (non-surgical)  Impairment Group Code:  8.3    MEDICAL/FUNCTIONAL HISTORY/STATUS: increased pain, RLE more swollen than the left but that is unchanged. RLE is WBAT    Consultations/Labs/X-rays: Vitamin D level low at 14, pt on 2000 units daily      MEDICATION UPDATE:  Colace increased to tid for hard stool, lidocaine patched added for knee      NURSING FIMS:    Bowel:   Current level: continent    Bladder:   Current level: continent    Toilet Hygiene:   Current level :  Moderate Assist   Short term Toilet hygiene goal: Minimum assistance   Long term toilet hygiene goal:  Minimum assistance     Skin integrity: abdominal folds excoriated  Pain: norco and lidocaine patch for left knee pain    NUTRITION    Diet  general  Liquid consistency   thin    SOCIAL INFORMATION:  Lives with: alone,son lives across the [address], 3 entry steps, 1 rail  Prior Level of function:   independent prior but had decline over last month, couldn't drive, used rollator at home, straight cane in community, retired  DME:  Wheel chair, [address], straight cane, wheeled walker, rollator, extended tub bench, bedside commode    FAMILY / PATIENT EDUCATION:  Family teach with son today 3/6    PHYSICAL THERAPY    Bed mobility:   Current level: Modified Independent except sit to supine is Supervision, limited by pain   Short term bed mobility goal: met   Long term bed mobility goal: Modified Independent     Chair/bed transfers:  Current level: Modified Independent to Supervision with wheeled walker  Short term Chair/bed transfers goal: met   Long term

## 2020-03-06 NOTE — PROGRESS NOTES
pt's current self care levels and need for assist when completing tub/shower level bathing. Pt completed functional transfers with ww throughout therapy apt setting with son observing. Pt's son verbalized good understanding and states that himself and other family members with assist pt at discharge as needed. Pain Level:   5/10 pelvis, pain medication was provided     Additional Notes:   2/23/2020- Pt complained that she is not sleeping well. When pt questioned further pt stated she normally sleeps in a recliner @ home as she is unable to get in or out of bed prior to her admission. 3/4/20 pt requires increased time & effort to complete ADL tasks during sponge bathing tasks. Pt completes minimal areas unless encouraged to wash more areas & thoroughly, likely due to effort required to complete bathing tasks, due to arthritic joints & pain. Discussed with pt likely her needs with assist to complete bathing at home with pt reporting her granddaughters could assist her. Patient has made good  progress during treatment sessions toward set goals. Therapy emphasis to obtain goals: Current Treatment Recommendations: Pain Management, Positioning, Gait Training, Safety Education & Training, Balance Training, Patient/Caregiver Education & Training, Self-Care / ADL, Functional Mobility Training, Equipment Evaluation, Education, & procurement, Home Management Training, Endurance Training     [x] Continue with current OT Plan of care.   [] Prepare for Discharge     DISCHARGE RECOMMENDATIONS  Recommended DME:  [facility]     Post Discharge Care:   []Home Independently  []Home with 24hr Care / Supervision []Home with Partial Supervision []Home with Home Health OT []Home with Out Pt OT []Other: ___   Comments:         Time in Time out Tx Time Breakdown  Variance:   First Session  [id number]  [] Individual Tx-   [x] Concurrent Tx-  [] Co-Tx -   [] Group Tx -   [] Time Missed -     Second Session [id number] [] Individual Tx-   [x] Concurrent Tx -45 Mins  [] Co-Tx -   [] Group Tx -   [] Time Missed -     Third Session    [] Individual Tx-   [] Concurrent Tx -  [] Co-Tx -   [] Group Tx -   [] Time Missed -         Total Tx Time: 90 Mins     [facility] OTR/L [address] [place]/L [id number]

## 2020-03-06 NOTE — CARE COORDINATION
Per team meeting:  D/c 3/11. Updated patient son Luisa Lyons, and granddaughter Candie. All goals are mod I to I with the need for Sup in bathing. DME - patient has all DME. Home health care - Per patient choice referral to [facility]. Nursing / aide/PT/OT. The Plan for Transition of Care is related to the following treatment goals: home wit home care. The Patient and/or patient representative tracey Lyons was provided with a choice of provider and agrees   with the discharge plan. [x] Yes [] No    Freedom of choice list was provided with basic dialogue that supports the patient's individualized plan of care/goals, treatment preferences and shares the quality data associated with the providers. [x] Yes [] No    FT - 3/6 with son and granddaughter.     Abdifatah Christianson

## 2020-03-07 LAB
ANION GAP SERPL CALCULATED.3IONS-SCNC: 14 MMOL/L (ref 7–16)
BASOPHILS ABSOLUTE: 0.04 E9/L (ref 0–0.2)
BASOPHILS RELATIVE PERCENT: 0.6 % (ref 0–2)
BUN BLDV-MCNC: 22 MG/DL (ref 8–23)
CALCIUM SERPL-MCNC: 9.3 MG/DL (ref 8.6–10.2)
CHLORIDE BLD-SCNC: 101 MMOL/L (ref 98–107)
CO2: 26 MMOL/L (ref 22–29)
CREAT SERPL-MCNC: 1 MG/DL (ref 0.5–1)
EOSINOPHILS ABSOLUTE: 0.53 E9/L (ref 0.05–0.5)
EOSINOPHILS RELATIVE PERCENT: 7.7 % (ref 0–6)
GFR AFRICAN AMERICAN: >60
GFR NON-AFRICAN AMERICAN: 53 ML/MIN/1.73
GLUCOSE BLD-MCNC: 92 MG/DL (ref 74–99)
HCT VFR BLD CALC: 35.1 % (ref 34–48)
HEMOGLOBIN: 10.5 G/DL (ref 11.5–15.5)
IMMATURE GRANULOCYTES #: 0.02 E9/L
IMMATURE GRANULOCYTES %: 0.3 % (ref 0–5)
LYMPHOCYTES ABSOLUTE: 1.68 E9/L (ref 1.5–4)
LYMPHOCYTES RELATIVE PERCENT: 24.3 % (ref 20–42)
MCH RBC QN AUTO: 26.2 PG (ref 26–35)
MCHC RBC AUTO-ENTMCNC: 29.9 % (ref 32–34.5)
MCV RBC AUTO: 87.5 FL (ref 80–99.9)
MONOCYTES ABSOLUTE: 0.83 E9/L (ref 0.1–0.95)
MONOCYTES RELATIVE PERCENT: 12 % (ref 2–12)
NEUTROPHILS ABSOLUTE: 3.8 E9/L (ref 1.8–7.3)
NEUTROPHILS RELATIVE PERCENT: 55.1 % (ref 43–80)
PDW BLD-RTO: 16.9 FL (ref 11.5–15)
PLATELET # BLD: 249 E9/L (ref 130–450)
PMV BLD AUTO: 10 FL (ref 7–12)
POTASSIUM SERPL-SCNC: 3.8 MMOL/L (ref 3.5–5)
RBC # BLD: 4.01 E12/L (ref 3.5–5.5)
SODIUM BLD-SCNC: 141 MMOL/L (ref 132–146)
WBC # BLD: 6.9 E9/L (ref 4.5–11.5)

## 2020-03-07 PROCEDURE — 85025 COMPLETE CBC W/AUTO DIFF WBC: CPT

## 2020-03-07 PROCEDURE — 6370000000 HC RX 637 (ALT 250 FOR IP): Performed by: INTERNAL MEDICINE

## 2020-03-07 PROCEDURE — 6370000000 HC RX 637 (ALT 250 FOR IP): Performed by: PHYSICAL MEDICINE & REHABILITATION

## 2020-03-07 PROCEDURE — 1280000000 HC REHAB R&B

## 2020-03-07 PROCEDURE — 97530 THERAPEUTIC ACTIVITIES: CPT

## 2020-03-07 PROCEDURE — 6360000002 HC RX W HCPCS: Performed by: INTERNAL MEDICINE

## 2020-03-07 PROCEDURE — 80048 BASIC METABOLIC PNL TOTAL CA: CPT

## 2020-03-07 PROCEDURE — 36415 COLL VENOUS BLD VENIPUNCTURE: CPT

## 2020-03-07 RX ADMIN — POLYETHYLENE GLYCOL 3350 17 G: 17 POWDER, FOR SOLUTION ORAL at 10:18

## 2020-03-07 RX ADMIN — ASPIRIN 81 MG 81 MG: 81 TABLET ORAL at 10:08

## 2020-03-07 RX ADMIN — FERROUS SULFATE TAB 325 MG (65 MG ELEMENTAL FE) 325 MG: 325 (65 FE) TAB at 18:22

## 2020-03-07 RX ADMIN — HEPARIN SODIUM 5000 UNITS: 10000 INJECTION INTRAVENOUS; SUBCUTANEOUS at 18:24

## 2020-03-07 RX ADMIN — NYSTATIN AND TRIAMCINOLONE ACETONIDE: 100000; 1 CREAM TOPICAL at 21:12

## 2020-03-07 RX ADMIN — NYSTATIN AND TRIAMCINOLONE ACETONIDE: 100000; 1 CREAM TOPICAL at 10:12

## 2020-03-07 RX ADMIN — HYDROCODONE BITARTRATE AND ACETAMINOPHEN 1 TABLET: 5; 325 TABLET ORAL at 23:50

## 2020-03-07 RX ADMIN — CARVEDILOL 12.5 MG: 6.25 TABLET, FILM COATED ORAL at 10:05

## 2020-03-07 RX ADMIN — FERROUS SULFATE TAB 325 MG (65 MG ELEMENTAL FE) 325 MG: 325 (65 FE) TAB at 10:07

## 2020-03-07 RX ADMIN — ROSUVASTATIN CALCIUM 20 MG: 20 TABLET, FILM COATED ORAL at 21:11

## 2020-03-07 RX ADMIN — SKIN PROTECTANT: 44 OINTMENT TOPICAL at 10:12

## 2020-03-07 RX ADMIN — HYDROCODONE BITARTRATE AND ACETAMINOPHEN 1 TABLET: 5; 325 TABLET ORAL at 19:18

## 2020-03-07 RX ADMIN — DOCUSATE SODIUM 100 MG: 100 CAPSULE, LIQUID FILLED ORAL at 10:07

## 2020-03-07 RX ADMIN — HYDROCODONE BITARTRATE AND ACETAMINOPHEN 1 TABLET: 5; 325 TABLET ORAL at 00:11

## 2020-03-07 RX ADMIN — Medication 2000 UNITS: at 10:06

## 2020-03-07 RX ADMIN — DOCUSATE SODIUM 100 MG: 100 CAPSULE, LIQUID FILLED ORAL at 14:56

## 2020-03-07 RX ADMIN — DOCUSATE SODIUM 100 MG: 100 CAPSULE, LIQUID FILLED ORAL at 21:11

## 2020-03-07 RX ADMIN — CLOPIDOGREL 75 MG: 75 TABLET, FILM COATED ORAL at 10:05

## 2020-03-07 RX ADMIN — Medication 250 MG: at 21:11

## 2020-03-07 RX ADMIN — HYDROCODONE BITARTRATE AND ACETAMINOPHEN 1 TABLET: 5; 325 TABLET ORAL at 14:56

## 2020-03-07 RX ADMIN — HYDROCODONE BITARTRATE AND ACETAMINOPHEN 1 TABLET: 5; 325 TABLET ORAL at 05:52

## 2020-03-07 RX ADMIN — NYSTATIN OINTMENT: 100000 OINTMENT TOPICAL at 21:12

## 2020-03-07 RX ADMIN — HYDROCODONE BITARTRATE AND ACETAMINOPHEN 1 TABLET: 5; 325 TABLET ORAL at 10:18

## 2020-03-07 RX ADMIN — HEPARIN SODIUM 5000 UNITS: 10000 INJECTION INTRAVENOUS; SUBCUTANEOUS at 05:52

## 2020-03-07 RX ADMIN — NYSTATIN OINTMENT: 100000 OINTMENT TOPICAL at 10:11

## 2020-03-07 RX ADMIN — FUROSEMIDE 40 MG: 40 TABLET ORAL at 10:07

## 2020-03-07 RX ADMIN — CARVEDILOL 6.25 MG: 6.25 TABLET, FILM COATED ORAL at 18:23

## 2020-03-07 ASSESSMENT — PAIN DESCRIPTION - ONSET
ONSET: ON-GOING

## 2020-03-07 ASSESSMENT — PAIN DESCRIPTION - DESCRIPTORS
DESCRIPTORS: ACHING;DISCOMFORT;SORE
DESCRIPTORS: ACHING;DISCOMFORT
DESCRIPTORS: ACHING;DISCOMFORT;SORE

## 2020-03-07 ASSESSMENT — PAIN SCALES - GENERAL
PAINLEVEL_OUTOF10: 8
PAINLEVEL_OUTOF10: 7
PAINLEVEL_OUTOF10: 3
PAINLEVEL_OUTOF10: 0
PAINLEVEL_OUTOF10: 3
PAINLEVEL_OUTOF10: 6
PAINLEVEL_OUTOF10: 3
PAINLEVEL_OUTOF10: 0
PAINLEVEL_OUTOF10: 6
PAINLEVEL_OUTOF10: 8
PAINLEVEL_OUTOF10: 5
PAINLEVEL_OUTOF10: 6

## 2020-03-07 ASSESSMENT — PAIN - FUNCTIONAL ASSESSMENT
PAIN_FUNCTIONAL_ASSESSMENT: PREVENTS OR INTERFERES SOME ACTIVE ACTIVITIES AND ADLS
PAIN_FUNCTIONAL_ASSESSMENT: ACTIVITIES ARE NOT PREVENTED

## 2020-03-07 ASSESSMENT — PAIN DESCRIPTION - PROGRESSION
CLINICAL_PROGRESSION: NOT CHANGED
CLINICAL_PROGRESSION: GRADUALLY IMPROVING
CLINICAL_PROGRESSION: NOT CHANGED

## 2020-03-07 ASSESSMENT — PAIN DESCRIPTION - FREQUENCY
FREQUENCY: CONTINUOUS

## 2020-03-07 ASSESSMENT — PAIN DESCRIPTION - LOCATION
LOCATION: BACK;PELVIS
LOCATION: BACK
LOCATION: BACK;PELVIS

## 2020-03-07 ASSESSMENT — PAIN DESCRIPTION - PAIN TYPE
TYPE: ACUTE PAIN
TYPE: CHRONIC PAIN
TYPE: ACUTE PAIN
TYPE: ACUTE PAIN
TYPE: CHRONIC PAIN
TYPE: CHRONIC PAIN

## 2020-03-07 ASSESSMENT — PAIN DESCRIPTION - ORIENTATION
ORIENTATION: LOWER
ORIENTATION: RIGHT;LOWER;MID
ORIENTATION: RIGHT;LOWER;MID
ORIENTATION: RIGHT;LOWER

## 2020-03-07 NOTE — PROGRESS NOTES
Progress Note    Subjective/   [de-identified]y.o. year old female on the rehab unit for pelvic fracture. Continues to see improvement. Pain controlled with current medications. Tolerating rehab program.           Objective/   VITALS:  /60   Pulse 67   Temp 97.1 °F (36.2 °C) (Temporal)   Resp 18   Ht 5' 2\" (1.575 m)   Wt 180 lb 4.8 oz (81.8 kg)   SpO2 96%   BMI 32.98 kg/m²   24HR INTAKE/OUTPUT:      Intake/Output Summary (Last 24 hours) at 3/7/2020 1640  Last data filed at 3/7/2020 1130  Gross per 24 hour   Intake 840 ml   Output --   Net 840 ml     Constitutional:  Alert, awake, no apparent distress   Cardiovascular:  S1, S2 without m/r/g   Respiratory:  CTA B without w/r/r   Abdomen: +BS, protuberant  Ext: no calf tenderness, (B) LE edema worse on the (R)  Neuro: aaox3, good sitting balance. Functional Level    Initial Evaluation  2/21/20 AM PM Short Term Goals Long Term Goals    Was pt agreeable to Eval/treatment? yes Yes  NA       Does pt have pain? 3/10 right groin at rest  8/10 right groin with activty  Reported 6/10 R groin pain at rest, 8/10 with mobility. RN aware.         Bed Mobility  Rolling: Julissa  Supine to sit: Julissa  Sit to supine: modA  Scooting: Julissa NT, patient up in WC   sup Mod I   Transfers Sit to stand: min/modA  Stand to sit: min/modA  Stand pivot: min/modA with ww Sit to stand:  Mod I  Stand to sit: Mod I  Stand pivot: Supervision with [employer]   sba with AAD Mod I with AAD   Ambulation    8 feet with ww with Julissa 75 feet with WW with SBA/Supervision   50 feet with AAD with sba >150 feet with AAD with mod I   Walking 10 feet on uneven surface NT NT         Wheel Chair Mobility 76' with bilateral UE sba  NT   50 with bilateral UE sup  >150' with bilateral UE mod I   Car Transfers NT SBA  Verbal cues   sup Mod I   Stair negotiation: ascended and descended NT 4 steps with L rail with SBA  Lateral approach, non-reciprocal   4 steps with bilateral rail with Julissa >4 steps with one rail with sba Curb Step:   ascended and descended NT NT   4 inch step with AAD and Julissa 4 inch step with AAD and sba   Picking up object off the floor NT NT         BLE ROM WFL (gross knee flexion/extension limitations due to OA/audible crepitus noted)           BLE Strength R LE: hip 3-/5, knee 3+/5, ankle 4/5     L LE: hip 3+/5, knee 4/5, ankle 4/5           Balance  Sitting: Independent   Standing: min/modA with ww Sitting: Independent   Standing: SBA with [employer]               Scheduled Meds:   lidocaine  1 patch Transdermal Daily    docusate sodium  100 mg Oral TID    aspirin  81 mg Oral Daily    carvedilol  12.5 mg Oral QAM    carvedilol  6.25 mg Oral Nightly    clopidogrel  75 mg Oral Daily    ferrous sulfate  325 mg Oral BID    furosemide  40 mg Oral Daily    heparin (porcine)  5,000 Units Subcutaneous Q12H    magnesium gluconate  250 mg Oral Nightly    mineral oil-hydrophilic petrolatum   Topical Daily    nystatin-triamcinolone   Topical BID    rosuvastatin  20 mg Oral Daily    vitamin D  2,000 Units Oral Daily    nystatin   Topical BID     Continuous Infusions:  PRN Meds:HYDROcodone-acetaminophen, magnesium hydroxide, polyethylene glycol  I/O last 3 completed shifts: In: 840 [P.O.:840]  Out: -   No intake/output data recorded. Labs reviewed  CBC:   Recent Labs     03/07/20  0525   WBC 6.9   HGB 10.5*        BMP:    Recent Labs     03/07/20  0525      K 3.8      CO2 26   BUN 22   CREATININE 1.0   GLUCOSE 92     Hepatic: No results for input(s): AST, ALT, ALB, BILITOT, ALKPHOS in the last 72 hours. BNP: No results for input(s): BNP in the last 72 hours. Lipids: No results for input(s): CHOL, HDL in the last 72 hours. Invalid input(s): LDLCALCU  INR: No results for input(s): INR in the last 72 hours.     Assessment/  Patient Active Problem List:     Fall due to stumbling     Anemia     HTN (hypertension)     OA (osteoarthritis)     Spinal stenosis     Osteoporosis     Chronic pain Facial contusion     Compression fx, lumbar spine ([facility])     Compression fx, thoracic spine (HCC)     Valvular heart disease     Tachycardia     Closed fracture of nasal bone     Acute kidney injury ([facility] [address]     Acute renal failure (ARF) ([facility])     Closed fracture of sacrum, initial encounter ([facility])     DDD (degenerative disc disease), lumbar     Hypoalbuminemia     Unable to ambulate     Physical deconditioning     CKD (chronic kidney disease)     History of aortic valve replacement     Multiple fracture      Plan/  1. Continue rehab program  2. Continue current medications  3. Increase activity as able  4. Continue dvt prophylaxis  5. Continue precautions as per ortho  6.  Labs reviewed and look good        Martha Valverde MD

## 2020-03-08 PROCEDURE — 1280000000 HC REHAB R&B

## 2020-03-08 PROCEDURE — 6370000000 HC RX 637 (ALT 250 FOR IP): Performed by: INTERNAL MEDICINE

## 2020-03-08 PROCEDURE — 6370000000 HC RX 637 (ALT 250 FOR IP): Performed by: PHYSICAL MEDICINE & REHABILITATION

## 2020-03-08 PROCEDURE — 6360000002 HC RX W HCPCS: Performed by: INTERNAL MEDICINE

## 2020-03-08 PROCEDURE — 97530 THERAPEUTIC ACTIVITIES: CPT

## 2020-03-08 PROCEDURE — 97535 SELF CARE MNGMENT TRAINING: CPT

## 2020-03-08 RX ADMIN — DOCUSATE SODIUM 100 MG: 100 CAPSULE, LIQUID FILLED ORAL at 09:24

## 2020-03-08 RX ADMIN — NYSTATIN AND TRIAMCINOLONE ACETONIDE: 100000; 1 CREAM TOPICAL at 21:02

## 2020-03-08 RX ADMIN — ROSUVASTATIN CALCIUM 20 MG: 20 TABLET, FILM COATED ORAL at 20:59

## 2020-03-08 RX ADMIN — HEPARIN SODIUM 5000 UNITS: 10000 INJECTION INTRAVENOUS; SUBCUTANEOUS at 06:05

## 2020-03-08 RX ADMIN — Medication 2000 UNITS: at 09:24

## 2020-03-08 RX ADMIN — HYDROCODONE BITARTRATE AND ACETAMINOPHEN 1 TABLET: 5; 325 TABLET ORAL at 10:49

## 2020-03-08 RX ADMIN — CARVEDILOL 6.25 MG: 6.25 TABLET, FILM COATED ORAL at 18:22

## 2020-03-08 RX ADMIN — SKIN PROTECTANT: 44 OINTMENT TOPICAL at 09:27

## 2020-03-08 RX ADMIN — CARVEDILOL 12.5 MG: 6.25 TABLET, FILM COATED ORAL at 09:24

## 2020-03-08 RX ADMIN — DOCUSATE SODIUM 100 MG: 100 CAPSULE, LIQUID FILLED ORAL at 14:44

## 2020-03-08 RX ADMIN — DOCUSATE SODIUM 100 MG: 100 CAPSULE, LIQUID FILLED ORAL at 20:59

## 2020-03-08 RX ADMIN — ASPIRIN 81 MG 81 MG: 81 TABLET ORAL at 09:24

## 2020-03-08 RX ADMIN — NYSTATIN OINTMENT: 100000 OINTMENT TOPICAL at 09:23

## 2020-03-08 RX ADMIN — CLOPIDOGREL 75 MG: 75 TABLET, FILM COATED ORAL at 09:24

## 2020-03-08 RX ADMIN — FERROUS SULFATE TAB 325 MG (65 MG ELEMENTAL FE) 325 MG: 325 (65 FE) TAB at 09:24

## 2020-03-08 RX ADMIN — HYDROCODONE BITARTRATE AND ACETAMINOPHEN 1 TABLET: 5; 325 TABLET ORAL at 06:21

## 2020-03-08 RX ADMIN — HYDROCODONE BITARTRATE AND ACETAMINOPHEN 1 TABLET: 5; 325 TABLET ORAL at 23:40

## 2020-03-08 RX ADMIN — HEPARIN SODIUM 5000 UNITS: 10000 INJECTION INTRAVENOUS; SUBCUTANEOUS at 18:23

## 2020-03-08 RX ADMIN — HYDROCODONE BITARTRATE AND ACETAMINOPHEN 1 TABLET: 5; 325 TABLET ORAL at 19:19

## 2020-03-08 RX ADMIN — NYSTATIN AND TRIAMCINOLONE ACETONIDE: 100000; 1 CREAM TOPICAL at 09:27

## 2020-03-08 RX ADMIN — FUROSEMIDE 40 MG: 40 TABLET ORAL at 09:22

## 2020-03-08 RX ADMIN — HYDROCODONE BITARTRATE AND ACETAMINOPHEN 1 TABLET: 5; 325 TABLET ORAL at 14:49

## 2020-03-08 RX ADMIN — FERROUS SULFATE TAB 325 MG (65 MG ELEMENTAL FE) 325 MG: 325 (65 FE) TAB at 18:23

## 2020-03-08 RX ADMIN — Medication 250 MG: at 20:59

## 2020-03-08 ASSESSMENT — PAIN - FUNCTIONAL ASSESSMENT
PAIN_FUNCTIONAL_ASSESSMENT: ACTIVITIES ARE NOT PREVENTED
PAIN_FUNCTIONAL_ASSESSMENT: ACTIVITIES ARE NOT PREVENTED

## 2020-03-08 ASSESSMENT — PAIN DESCRIPTION - ONSET
ONSET: ON-GOING

## 2020-03-08 ASSESSMENT — PAIN SCALES - GENERAL
PAINLEVEL_OUTOF10: 6
PAINLEVEL_OUTOF10: 4
PAINLEVEL_OUTOF10: 8
PAINLEVEL_OUTOF10: 0
PAINLEVEL_OUTOF10: 4
PAINLEVEL_OUTOF10: 4
PAINLEVEL_OUTOF10: 3
PAINLEVEL_OUTOF10: 7

## 2020-03-08 ASSESSMENT — PAIN DESCRIPTION - LOCATION
LOCATION: BACK;PELVIS;HIP
LOCATION: BACK
LOCATION: BACK;PELVIS
LOCATION: BACK

## 2020-03-08 ASSESSMENT — PAIN DESCRIPTION - PROGRESSION
CLINICAL_PROGRESSION: NOT CHANGED
CLINICAL_PROGRESSION: NOT CHANGED

## 2020-03-08 ASSESSMENT — PAIN DESCRIPTION - ORIENTATION
ORIENTATION: RIGHT
ORIENTATION: LOWER
ORIENTATION: LOWER
ORIENTATION: RIGHT

## 2020-03-08 ASSESSMENT — PAIN DESCRIPTION - FREQUENCY
FREQUENCY: CONTINUOUS

## 2020-03-08 ASSESSMENT — PAIN DESCRIPTION - PAIN TYPE
TYPE: ACUTE PAIN;CHRONIC PAIN
TYPE: ACUTE PAIN;CHRONIC PAIN
TYPE: CHRONIC PAIN
TYPE: ACUTE PAIN;CHRONIC PAIN

## 2020-03-08 ASSESSMENT — PAIN DESCRIPTION - DESCRIPTORS
DESCRIPTORS: ACHING;DISCOMFORT

## 2020-03-08 NOTE — PROGRESS NOTES
SBA      CGA       SBA  ww      ww      ww  No LOB during sit to stand transfers while using grab bar. Functional Exercises / Activity:  Pt completed UB dressing then LB skill using A.E for joint protection/spinal precautions with good follow noted. Pt engaged in w/c<>commode transfers including clothing mgmt and toilet hygiene at [facility] utilizing grab bars for safety and balance. Pt completed simulated bathing for UB/LB tasks using LH sponge to reach below knees & feet to increase independence with ADL;s  Sensory / Neuromuscular Re-Education:      Cognitive Skills:   Status Comments   Problem   Solving good     Memory good     Sequencing good     Safety fair +      Visual Perception:    Education:  -Pt educated using A.E for LB dressing to increase ADL;s while following spinal precautions. [x] Family teach completed on:3/6/20 Pt's son present for family teach session. He was updated on pt's current self care levels and need for assist when completing tub/shower level bathing. Pt completed functional transfers with ww throughout therapy apt setting with son observing. Pt's son verbalized good understanding and states that himself and other family members with assist pt at discharge as needed. Pain Level:   6/10 pelvis today     Additional Notes:   2/23/2020- Pt complained that she is not sleeping well. When pt questioned further pt stated she normally sleeps in a recliner @ home as she is unable to get in or out of bed prior to her admission. 3/4/20 pt requires increased time & effort to complete ADL tasks during sponge bathing tasks. Pt completes minimal areas unless encouraged to wash more areas & thoroughly, likely due to effort required to complete bathing tasks, due to arthritic joints & pain. Discussed with pt likely her needs with assist to complete bathing at home with pt reporting her granddaughters could assist her.      Patient has made good  progress during treatment sessions toward set

## 2020-03-09 PROCEDURE — 97530 THERAPEUTIC ACTIVITIES: CPT

## 2020-03-09 PROCEDURE — 6360000002 HC RX W HCPCS: Performed by: INTERNAL MEDICINE

## 2020-03-09 PROCEDURE — 6370000000 HC RX 637 (ALT 250 FOR IP): Performed by: INTERNAL MEDICINE

## 2020-03-09 PROCEDURE — 6370000000 HC RX 637 (ALT 250 FOR IP): Performed by: PHYSICAL MEDICINE & REHABILITATION

## 2020-03-09 PROCEDURE — 97110 THERAPEUTIC EXERCISES: CPT

## 2020-03-09 PROCEDURE — 97535 SELF CARE MNGMENT TRAINING: CPT

## 2020-03-09 PROCEDURE — 1280000000 HC REHAB R&B

## 2020-03-09 RX ADMIN — HYDROCODONE BITARTRATE AND ACETAMINOPHEN 1 TABLET: 5; 325 TABLET ORAL at 13:54

## 2020-03-09 RX ADMIN — FERROUS SULFATE TAB 325 MG (65 MG ELEMENTAL FE) 325 MG: 325 (65 FE) TAB at 08:42

## 2020-03-09 RX ADMIN — CARVEDILOL 6.25 MG: 6.25 TABLET, FILM COATED ORAL at 17:35

## 2020-03-09 RX ADMIN — HYDROCODONE BITARTRATE AND ACETAMINOPHEN 1 TABLET: 5; 325 TABLET ORAL at 09:40

## 2020-03-09 RX ADMIN — FUROSEMIDE 40 MG: 40 TABLET ORAL at 08:44

## 2020-03-09 RX ADMIN — DOCUSATE SODIUM 100 MG: 100 CAPSULE, LIQUID FILLED ORAL at 13:54

## 2020-03-09 RX ADMIN — Medication 2000 UNITS: at 08:44

## 2020-03-09 RX ADMIN — CLOPIDOGREL 75 MG: 75 TABLET, FILM COATED ORAL at 08:44

## 2020-03-09 RX ADMIN — FERROUS SULFATE TAB 325 MG (65 MG ELEMENTAL FE) 325 MG: 325 (65 FE) TAB at 17:36

## 2020-03-09 RX ADMIN — ASPIRIN 81 MG 81 MG: 81 TABLET ORAL at 08:43

## 2020-03-09 RX ADMIN — HEPARIN SODIUM 5000 UNITS: 10000 INJECTION INTRAVENOUS; SUBCUTANEOUS at 05:52

## 2020-03-09 RX ADMIN — ROSUVASTATIN CALCIUM 20 MG: 20 TABLET, FILM COATED ORAL at 21:58

## 2020-03-09 RX ADMIN — DOCUSATE SODIUM 100 MG: 100 CAPSULE, LIQUID FILLED ORAL at 08:43

## 2020-03-09 RX ADMIN — HYDROCODONE BITARTRATE AND ACETAMINOPHEN 1 TABLET: 5; 325 TABLET ORAL at 17:40

## 2020-03-09 RX ADMIN — HYDROCODONE BITARTRATE AND ACETAMINOPHEN 1 TABLET: 5; 325 TABLET ORAL at 05:52

## 2020-03-09 RX ADMIN — DOCUSATE SODIUM 100 MG: 100 CAPSULE, LIQUID FILLED ORAL at 21:51

## 2020-03-09 RX ADMIN — HYDROCODONE BITARTRATE AND ACETAMINOPHEN 1 TABLET: 5; 325 TABLET ORAL at 21:53

## 2020-03-09 RX ADMIN — Medication 250 MG: at 21:50

## 2020-03-09 RX ADMIN — HEPARIN SODIUM 5000 UNITS: 10000 INJECTION INTRAVENOUS; SUBCUTANEOUS at 17:36

## 2020-03-09 RX ADMIN — CARVEDILOL 12.5 MG: 6.25 TABLET, FILM COATED ORAL at 08:45

## 2020-03-09 ASSESSMENT — PAIN DESCRIPTION - FREQUENCY
FREQUENCY: CONTINUOUS

## 2020-03-09 ASSESSMENT — PAIN DESCRIPTION - PAIN TYPE
TYPE: ACUTE PAIN
TYPE: ACUTE PAIN
TYPE: ACUTE PAIN;CHRONIC PAIN

## 2020-03-09 ASSESSMENT — PAIN DESCRIPTION - ONSET
ONSET: ON-GOING

## 2020-03-09 ASSESSMENT — PAIN DESCRIPTION - DESCRIPTORS
DESCRIPTORS: ACHING;DISCOMFORT
DESCRIPTORS: ACHING;DISCOMFORT
DESCRIPTORS: ACHING;DISCOMFORT;DULL;SORE
DESCRIPTORS: CONSTANT;DISCOMFORT;ACHING;SORE

## 2020-03-09 ASSESSMENT — PAIN SCALES - GENERAL
PAINLEVEL_OUTOF10: 5
PAINLEVEL_OUTOF10: 7
PAINLEVEL_OUTOF10: 4
PAINLEVEL_OUTOF10: 8
PAINLEVEL_OUTOF10: 5
PAINLEVEL_OUTOF10: 8
PAINLEVEL_OUTOF10: 5
PAINLEVEL_OUTOF10: 7
PAINLEVEL_OUTOF10: 8
PAINLEVEL_OUTOF10: 5
PAINLEVEL_OUTOF10: 4

## 2020-03-09 ASSESSMENT — PAIN DESCRIPTION - PROGRESSION
CLINICAL_PROGRESSION: NOT CHANGED
CLINICAL_PROGRESSION: NOT CHANGED

## 2020-03-09 ASSESSMENT — PAIN DESCRIPTION - LOCATION
LOCATION: BACK
LOCATION: BACK;PELVIS
LOCATION: GROIN;HIP;KNEE
LOCATION: BACK
LOCATION: GROIN;HIP

## 2020-03-09 ASSESSMENT — PAIN DESCRIPTION - ORIENTATION
ORIENTATION: RIGHT
ORIENTATION: RIGHT

## 2020-03-09 NOTE — PROGRESS NOTES
Physical Therapy    Facility/Department: [facility] 5SE REHAB  Treatment Note     NAME: Amaya Driscoll  : 1940  MRN: [id number]    Date of Service: 3/9/2020    Evaluating Therapist: Екатерина Cameron DPT    ROOM: [address] [place], [place] [id number]  DIAGNOSIS: multiple trauma  PMH: To ED on 20 for back/hip pain. X-ray right hip/CT lumbar spine: Fracture about the superior and inferior pubic rami on the right as well as right sacral alar fracture (Right LC 1 pelvic ring injury). Noted Subacute Fx R L5 Transverse Process. Chronic Partly Healed R L5 Pars Fx and Chronic Stable L2 Compression Fx PMH includes anemia, HTN, OA, osteoporosis, CABG x2, valvular heart disease. PRECAUTIONS: WBAT RLE, Falls, Spinal Neutral,     Pt lives alone in a 1 story home with 3 stairs to enter with a L HR. Bed is on first floor and bath is on first floor. Pt ambulated with rollator independently PTA. Equipment Owned: [facility]; Rollator. Pt reporting shuffled patterning with ambulation PTA. Pt reporting her son lives next door and assists PRN. Initial Evaluation  20 AM PM Short Term Goals Long Term Goals    Was pt agreeable to Eval/treatment? yes Yes  Yes     Does pt have pain? 3/10 right groin at rest  8/10 right groin with activty  6/10 R groin pain at rest 8/10 R groin pain     Bed Mobility  Rolling: Julissa  Supine to sit: Julissa  Sit to supine: modA  Scooting: Julissa NT NT sup Mod I   Transfers Sit to stand: min/modA  Stand to sit: min/modA  Stand pivot: min/modA with ww Sit to stand:  Mod I  Stand to sit: Mod I  Stand pivot: Supervision with [facility] Sit<>stand lEiel  Stand pivot: sup front [facility] sba with AAD Mod I with AAD   Ambulation    8 feet with ww with Julissa 100 feet x 2 with [facility] with  feet front [facility] SBA 50 feet with AAD with sba >150 feet with AAD with mod I   Walking 10 feet on uneven surface NT NT NT     Wheel Chair Mobility 76' with bilateral UE sba  NT  50 with bilateral UE sup  >150' with bilateral UE mod I   Car Transfers NT SBA  Verbal cues  sup Mod I Stair negotiation: ascended and descended NT 4 steps with L rail with SBA  Lateral approach, non-reciprocal  4 steps with bilateral rail with Julissa >4 steps with one rail with sba   Curb Step:   ascended and descended NT NT  4 inch step with AAD and Julissa 4 inch step with AAD and sba   Picking up object off the floor NT NT      BLE ROM WFL (gross knee flexion/extension limitations due to OA/audible crepitus noted)       BLE Strength R LE: hip 3-/5, knee 3+/5, ankle 4/5    L LE: hip 3+/5, knee 4/5, ankle 4/5       Balance  Sitting: Independent   Standing: min/modA with ww Sitting: Independent   Standing: SBA with [employer]      Date Family Teach Completed TBA       Is additional Family Teaching Needed? Y or N Y       Hindering Progress Pain, OA Pain       PT recommended ELOS 3 weeks       Team's Discharge Plan        Therapist at Team Meeting          Therapeutic Exercise:   AM:   - functional mobility as noted above  - STS 2 x 5 SBA wheelchair<>front [employer]  PM:  - functional mobility as noted above  - multiple stand pivot transfers wheelchair<>toilet and wheelchair<>ambulation  - seated TherEx: Hip flexion, LAQ, ankle df/pf all performed 3 x 10 each    Patient education  Reviewed proper sequencing and hand placement for all transfers, ambulation, and stair negotiation. Patient response to education:   Pt verbalized understanding Pt demonstrated skill Pt requires further education in this area   x x  x     Additional Comments: Pt continues to tolerate sessions but has pain in R groin limiting mobility. Pt performs all functional mobility in slow labored fashion. During ambulation pt insists on occasionally leaning forward on front [employer] for comfort. Despite education on safety hazards, pt insists it is the only way she can walk. Increased pain in PM requiring increased rest time between bouts of activity.     AM  Time in: 0830  Time out: 0915  PM  Time in: 1300  Time out: 1345    Pt is making fair progress toward

## 2020-03-09 NOTE — PROGRESS NOTES
Occupational Therapy  OCCUPATIONAL THERAPY DAILY NOTE    Date:3/9/2020  Patient Name: Johanna Noguera  MRN: [id number]  : 1940  Room: [address] [place] [address]A     Diagnosis: Pelvic fx- superior & inferior pubic rami fx- R sacral alar fx & subacute fx R L5  Additional Pertinent Hx: OA, HTN, HLD, CKD, CAD, anemia, chronic back pain, pain pump, hx L2 compression fx- stable    Precautions: falls,WBAT RLE, Spinal Precautions     Functional Assessment:   Date Status AE  Comments   Feeding 3/4/20  Mod I      Grooming 3/8/20  S/set up     Bathing 3/8/20 UB: S/SBA (assist to wash back only)  LB: CGA/Min Assist   LH sponge    UB Dressing 3/8/20  Mod I/Sup  seated    LB Dressing 3/9/20  SBA  Reacher   Sock aid Pt donned pants with reacher. Pt donned slip on shoes    Homemaking 3/5/20  SBA  Standing       Functional Transfers / Balance:   Date Status DME  Comments   Sit Balance 3/8/20  Supervision     Stand Balance 3/9/20  SBA ww  Grab bar Table top level engaging BUE's in tx activity    [x] Tub  [] Shower   Transfer 3/6/20    Min A  ww    Commode   Transfer    Toileting  3/9/20       3/9/20    SBA      SBA  W/c to Grab bars Stand pivot transfer from w/c using wall grab rail    Functional   Mobility 3/6/20   SBA  ww    Other:sit to stand     Stand pivot        On/off recliner  3/9/20       3/9/20       3/5/20    SBA      SBA        SBA  ww      ww      ww             Functional Exercises / Activity:  BUE ROM exercises with towel stretches up incline wedge 3 sets 10 reps in all planes   Pt completed standing balance/endurance activity at table top level while engaging BUE's in tx activity at SBA. Pt able to tolerate standing 5 mins before needing a seated rest break   BUE strength exercises : robert box with 8 # wt on table top surface 3 sets 10 reps                                            1 # dumbbell weights 3 sets 10 reps     B hand strength with 5 # digi flex 3 sets 15 reps     Sensory / Neuromuscular Re-Education:      Cognitive Skills:   Status Comments   Problem   Solving good     Memory good     Sequencing good     Safety fair +      Visual Perception:    Education:  Pt was educated on use of AE for LB dressing     [x] Family teach completed on:3/6/20 Pt's son present for family teach session. He was updated on pt's current self care levels and need for assist when completing tub/shower level bathing. Pt completed functional transfers with ww throughout therapy apt setting with son observing. Pt's son verbalized good understanding and states that himself and other family members with assist pt at discharge as needed. Pain Level:   6/10 pelvis today     Additional Notes:   2/23/2020- Pt complained that she is not sleeping well. When pt questioned further pt stated she normally sleeps in a recliner @ home as she is unable to get in or out of bed prior to her admission. 3/4/20 pt requires increased time & effort to complete ADL tasks during sponge bathing tasks. Pt completes minimal areas unless encouraged to wash more areas & thoroughly, likely due to effort required to complete bathing tasks, due to arthritic joints & pain. Discussed with pt likely her needs with assist to complete bathing at home with pt reporting her granddaughters could assist her. Patient has made good  progress during treatment sessions toward set goals. Therapy emphasis to obtain goals: Current Treatment Recommendations: Pain Management, Positioning, Gait Training, Safety Education & Training, Balance Training, Patient/Caregiver Education & Training, Self-Care / ADL, Functional Mobility Training, Equipment Evaluation, Education, & procurement, Home Management Training, Endurance Training     [x] Continue with current OT Plan of care.   [] Prepare for Discharge     DISCHARGE RECOMMENDATIONS  Recommended DME:  [facility]     Post Discharge Care:   []Home Independently  []Home with 24hr Care / Supervision []Home with Partial Supervision []Home with Home Health OT

## 2020-03-10 PROCEDURE — 97530 THERAPEUTIC ACTIVITIES: CPT

## 2020-03-10 PROCEDURE — 6360000002 HC RX W HCPCS: Performed by: INTERNAL MEDICINE

## 2020-03-10 PROCEDURE — 1280000000 HC REHAB R&B

## 2020-03-10 PROCEDURE — 6370000000 HC RX 637 (ALT 250 FOR IP): Performed by: PHYSICAL MEDICINE & REHABILITATION

## 2020-03-10 PROCEDURE — 97535 SELF CARE MNGMENT TRAINING: CPT

## 2020-03-10 PROCEDURE — 6370000000 HC RX 637 (ALT 250 FOR IP): Performed by: INTERNAL MEDICINE

## 2020-03-10 PROCEDURE — 97110 THERAPEUTIC EXERCISES: CPT

## 2020-03-10 RX ADMIN — NYSTATIN AND TRIAMCINOLONE ACETONIDE: 100000; 1 CREAM TOPICAL at 07:59

## 2020-03-10 RX ADMIN — CARVEDILOL 6.25 MG: 6.25 TABLET, FILM COATED ORAL at 19:40

## 2020-03-10 RX ADMIN — HEPARIN SODIUM 5000 UNITS: 10000 INJECTION INTRAVENOUS; SUBCUTANEOUS at 05:30

## 2020-03-10 RX ADMIN — Medication 2000 UNITS: at 07:55

## 2020-03-10 RX ADMIN — NYSTATIN OINTMENT: 100000 OINTMENT TOPICAL at 08:00

## 2020-03-10 RX ADMIN — CARVEDILOL 12.5 MG: 6.25 TABLET, FILM COATED ORAL at 07:56

## 2020-03-10 RX ADMIN — HYDROCODONE BITARTRATE AND ACETAMINOPHEN 1 TABLET: 5; 325 TABLET ORAL at 10:26

## 2020-03-10 RX ADMIN — DOCUSATE SODIUM 100 MG: 100 CAPSULE, LIQUID FILLED ORAL at 07:55

## 2020-03-10 RX ADMIN — Medication 250 MG: at 20:40

## 2020-03-10 RX ADMIN — HYDROCODONE BITARTRATE AND ACETAMINOPHEN 1 TABLET: 5; 325 TABLET ORAL at 19:40

## 2020-03-10 RX ADMIN — DOCUSATE SODIUM 100 MG: 100 CAPSULE, LIQUID FILLED ORAL at 14:25

## 2020-03-10 RX ADMIN — ROSUVASTATIN CALCIUM 20 MG: 20 TABLET, FILM COATED ORAL at 20:41

## 2020-03-10 RX ADMIN — HEPARIN SODIUM 5000 UNITS: 10000 INJECTION INTRAVENOUS; SUBCUTANEOUS at 19:41

## 2020-03-10 RX ADMIN — CLOPIDOGREL 75 MG: 75 TABLET, FILM COATED ORAL at 07:56

## 2020-03-10 RX ADMIN — HYDROCODONE BITARTRATE AND ACETAMINOPHEN 1 TABLET: 5; 325 TABLET ORAL at 05:17

## 2020-03-10 RX ADMIN — FERROUS SULFATE TAB 325 MG (65 MG ELEMENTAL FE) 325 MG: 325 (65 FE) TAB at 19:40

## 2020-03-10 RX ADMIN — ASPIRIN 81 MG 81 MG: 81 TABLET ORAL at 07:56

## 2020-03-10 RX ADMIN — MAGNESIUM HYDROXIDE 30 ML: 2400 SUSPENSION ORAL at 10:29

## 2020-03-10 RX ADMIN — FUROSEMIDE 40 MG: 40 TABLET ORAL at 07:55

## 2020-03-10 RX ADMIN — POLYETHYLENE GLYCOL 3350 17 G: 17 POWDER, FOR SOLUTION ORAL at 10:29

## 2020-03-10 RX ADMIN — FERROUS SULFATE TAB 325 MG (65 MG ELEMENTAL FE) 325 MG: 325 (65 FE) TAB at 07:55

## 2020-03-10 RX ADMIN — DOCUSATE SODIUM 100 MG: 100 CAPSULE, LIQUID FILLED ORAL at 19:40

## 2020-03-10 RX ADMIN — SKIN PROTECTANT: 44 OINTMENT TOPICAL at 07:59

## 2020-03-10 RX ADMIN — HYDROCODONE BITARTRATE AND ACETAMINOPHEN 1 TABLET: 5; 325 TABLET ORAL at 14:27

## 2020-03-10 RX ADMIN — NYSTATIN AND TRIAMCINOLONE ACETONIDE: 100000; 1 CREAM TOPICAL at 20:44

## 2020-03-10 ASSESSMENT — PAIN DESCRIPTION - LOCATION
LOCATION: PELVIS;KNEE
LOCATION: GROIN;HIP
LOCATION: GROIN
LOCATION: GROIN;HIP

## 2020-03-10 ASSESSMENT — PAIN DESCRIPTION - FREQUENCY: FREQUENCY: CONTINUOUS

## 2020-03-10 ASSESSMENT — PAIN SCALES - GENERAL
PAINLEVEL_OUTOF10: 8
PAINLEVEL_OUTOF10: 7
PAINLEVEL_OUTOF10: 6
PAINLEVEL_OUTOF10: 5
PAINLEVEL_OUTOF10: 5
PAINLEVEL_OUTOF10: 8
PAINLEVEL_OUTOF10: 7
PAINLEVEL_OUTOF10: 7

## 2020-03-10 ASSESSMENT — PAIN DESCRIPTION - PROGRESSION
CLINICAL_PROGRESSION: NOT CHANGED
CLINICAL_PROGRESSION: NOT CHANGED

## 2020-03-10 ASSESSMENT — PAIN - FUNCTIONAL ASSESSMENT: PAIN_FUNCTIONAL_ASSESSMENT: ACTIVITIES ARE NOT PREVENTED

## 2020-03-10 ASSESSMENT — PAIN DESCRIPTION - ONSET
ONSET: GRADUAL
ONSET: ON-GOING

## 2020-03-10 ASSESSMENT — PAIN DESCRIPTION - ORIENTATION
ORIENTATION: RIGHT
ORIENTATION: RIGHT
ORIENTATION: RIGHT;LEFT

## 2020-03-10 ASSESSMENT — PAIN DESCRIPTION - DESCRIPTORS
DESCRIPTORS: ACHING;DISCOMFORT

## 2020-03-10 ASSESSMENT — PAIN DESCRIPTION - PAIN TYPE
TYPE: CHRONIC PAIN
TYPE: ACUTE PAIN
TYPE: CHRONIC PAIN

## 2020-03-10 NOTE — PLAN OF CARE
Problem: Falls - Risk of:  Goal: Will remain free from falls  Description: Will remain free from falls  Outcome: Met This Shift

## 2020-03-10 NOTE — PROGRESS NOTES
Progress Note    Subjective/   [de-identified]y.o. year old female on the rehab unit for pelvic fracture. No new complaints. She continues to tolerate rehab. Objective/   VITALS:  BP (!) 121/55   Pulse 66   Temp 98.3 °F (36.8 °C) (Temporal)   Resp 16   Ht 5' 2\" (1.575 m)   Wt 180 lb 4.8 oz (81.8 kg)   SpO2 93%   BMI 32.98 kg/m²   24HR INTAKE/OUTPUT:      Intake/Output Summary (Last 24 hours) at 3/9/2020 2205  Last data filed at 3/9/2020 1230  Gross per 24 hour   Intake 360 ml   Output --   Net 360 ml     Constitutional:  Alert, awake, no apparent distress   Cardiovascular:  S1, S2 without m/r/g   Respiratory:  CTA B without w/r/r   Abdomen: Positive bowel sounds  Ext: No calf tenderness, decreased bilateral LE edema  Neuro: Awake, alert and oriented x3. Good sitting balance. No tremor    Functional Level    Initial Evaluation  2/21/20 AM PM Short Term Goals Long Term Goals    Was pt agreeable to Eval/treatment? yes Yes  Yes       Does pt have pain? 3/10 right groin at rest  8/10 right groin with activty  6/10 R groin pain at rest 8/10 R groin pain       Bed Mobility  Rolling: Julissa  Supine to sit: Julissa  Sit to supine: modA  Scooting: Julissa NT NT sup Mod I   Transfers Sit to stand: min/modA  Stand to sit: min/modA  Stand pivot: min/modA with ww Sit to stand:  Mod I  Stand to sit: Mod I  Stand pivot: Supervision with [employer] Sit<>stand Eliel  Stand pivot: sup front [employer] sba with AAD Mod I with AAD   Ambulation    8 feet with ww with Julissa 100 feet x 2 with [employer] with  feet front [employer] SBA 50 feet with AAD with sba >150 feet with AAD with mod I   Walking 10 feet on uneven surface NT NT NT       Wheel Chair Mobility 76' with bilateral UE sba  NT   50 with bilateral UE sup  >150' with bilateral UE mod I   Car Transfers NT SBA  Verbal cues   sup Mod I   Stair negotiation: ascended and descended NT 4 steps with L rail with SBA  Lateral approach, non-reciprocal   4 steps with bilateral rail with Julissa >4 steps with one

## 2020-03-10 NOTE — PROGRESS NOTES
Physical Therapy    Facility/Department: [facility] 5SE REHAB  Treatment Note     NAME: Tracie William  : 1940  MRN: [id number]    Date of Service: 3/10/2020    Evaluating Therapist: Darling Gates DPT    ROOM: [address] [place] [address]  DIAGNOSIS: multiple trauma  PMH: To ED on 20 for back/hip pain. X-ray right hip/CT lumbar spine: Fracture about the superior and inferior pubic rami on the right as well as right sacral alar fracture (Right LC 1 pelvic ring injury). Noted Subacute Fx R L5 Transverse Process. Chronic Partly Healed R L5 Pars Fx and Chronic Stable L2 Compression Fx PMH includes anemia, HTN, OA, osteoporosis, CABG x2, valvular heart disease. PRECAUTIONS: WBAT RLE, Falls, Spinal Neutral,     Pt lives alone in a 1 story home with 3 stairs to enter with a L HR. Bed is on first floor and bath is on first floor. Pt ambulated with rollator independently PTA. Equipment Owned: [employer]; Rollator. Pt reporting shuffled patterning with ambulation PTA. Pt reporting her son lives next door and assists PRN. Initial Evaluation  20 AM PM Short Term Goals Long Term Goals    Was pt agreeable to Eval/treatment? yes yes yes     Does pt have pain?  3/10 right groin at rest  8/10 right groin with activty  6/10 R groin pain  6/10 R groin/thigh pain        Bed Mobility  Rolling: Julissa  Supine to sit: Julissa  Sit to supine: modA  Scooting: Julissa NT NT sup Mod I   Transfers Sit to stand: min/modA  Stand to sit: min/modA  Stand pivot: min/modA with ww Sit to stand: mod I   Stand to sit: mod I  Stand pivot: mod I with ww Sit to stand: mod I  Stand to sit: mod I   Stand pivot: mod I with ww sba with AAD Mod I with AAD   Ambulation    8 feet with ww with Julissa 150 feet with ww with mod I  150' and 75'  feet with ww with mod I  50 feet with AAD with sba >150 feet with AAD with mod I   Walking 10 feet on uneven surface NT NT      Wheel Chair Mobility 76' with bilateral UE sba    50 with bilateral UE sup  >150' with bilateral UE mod I   Car

## 2020-03-10 NOTE — PROGRESS NOTES
Occupational Therapy  OCCUPATIONAL THERAPY DAILY NOTE    Date:3/10/2020  Patient Name: Maribel Carlin  MRN: [id number]  : 1940  Room: [address] [place] [address]-A     Diagnosis: Pelvic fx- superior & inferior pubic rami fx- R sacral alar fx & subacute fx R L5  Additional Pertinent Hx: OA, HTN, HLD, CKD, CAD, anemia, chronic back pain, pain pump, hx L2 compression fx- stable    Precautions: falls,WBAT RLE, Spinal Precautions     Functional Assessment:   Date Status AE  Comments   Feeding 3/4/20  Mod I      Grooming 3/8/20  S/set up     Bathing 3/8/20 UB: S/SBA (assist to wash back only)  LB: CGA/Min Assist   LH sponge    UB Dressing 3/8/20  Mod I/Sup  seated    LB Dressing 3/9/20  SBA  Reacher   Sock aid    Homemaking 3/10/20  SBA  ww Light meal prep both seated and standing at counter level      Functional Transfers / Balance:   Date Status DME  Comments   Sit Balance 3/8/20  Supervision     Stand Balance 3/9/20  SBA ww  Grab bar    [x] Tub  [] Shower   Transfer 3/6/20    Min A  ww    Commode   Transfer    Toileting  3/10/20       3/10/20    SBA      SBA  W/c to Grab bars    Functional   Mobility 3/10/20   SBA  ww Short distance in pt's room with ww    Other:sit to stand     Stand pivot        On/off recliner  3/10/20       3/9/20       3/5/20    SBA      SBA        SBA  ww      ww      ww             Functional Exercises / Activity:   BUE strength exercises: 2 # weighted ball 3 sets 10 reps in all planes                                             Red can do bar 3 sets 15 reps                                              Wheel and medium resistive theraputty on table top surface   ROM to RUE with ROM  Shoulder arc with pt using 1 # cuff weight       Sensory / Neuromuscular Re-Education:      Cognitive Skills:   Status Comments   Problem   Solving good     Memory good     Sequencing good     Safety fair +      Visual Perception:    Education:  Pt was educated on pacing and energy conservation techniques to utilize during ADL's

## 2020-03-11 VITALS
SYSTOLIC BLOOD PRESSURE: 108 MMHG | HEIGHT: 62 IN | OXYGEN SATURATION: 96 % | DIASTOLIC BLOOD PRESSURE: 58 MMHG | WEIGHT: 180.3 LBS | HEART RATE: 71 BPM | RESPIRATION RATE: 18 BRPM | BODY MASS INDEX: 33.18 KG/M2 | TEMPERATURE: 99.9 F

## 2020-03-11 PROCEDURE — 6370000000 HC RX 637 (ALT 250 FOR IP): Performed by: PHYSICAL MEDICINE & REHABILITATION

## 2020-03-11 PROCEDURE — 6360000002 HC RX W HCPCS: Performed by: INTERNAL MEDICINE

## 2020-03-11 PROCEDURE — 97535 SELF CARE MNGMENT TRAINING: CPT

## 2020-03-11 PROCEDURE — 97530 THERAPEUTIC ACTIVITIES: CPT

## 2020-03-11 PROCEDURE — 97110 THERAPEUTIC EXERCISES: CPT

## 2020-03-11 PROCEDURE — 6370000000 HC RX 637 (ALT 250 FOR IP): Performed by: INTERNAL MEDICINE

## 2020-03-11 RX ORDER — NYSTATIN 100000 U/G
OINTMENT TOPICAL
Qty: 1 TUBE | Refills: 3 | Status: SHIPPED | OUTPATIENT
Start: 2020-03-11 | End: 2021-01-01

## 2020-03-11 RX ORDER — PSEUDOEPHEDRINE HCL 30 MG
100 TABLET ORAL 3 TIMES DAILY
Status: ON HOLD | COMMUNITY
Start: 2020-03-11 | End: 2021-02-09

## 2020-03-11 RX ORDER — HYDROCODONE BITARTRATE AND ACETAMINOPHEN 5; 325 MG/1; MG/1
1 TABLET ORAL EVERY 4 HOURS PRN
Qty: 35 TABLET | Refills: 0 | Status: SHIPPED | OUTPATIENT
Start: 2020-03-11 | End: 2020-03-18

## 2020-03-11 RX ORDER — LIDOCAINE 4 G/G
1 PATCH TOPICAL DAILY
Qty: 30 PATCH | Refills: 1 | Status: SHIPPED | OUTPATIENT
Start: 2020-03-11

## 2020-03-11 RX ORDER — PETROLATUM 42 G/100G
OINTMENT TOPICAL
Qty: 1 TUBE | Refills: 1 | Status: SHIPPED | OUTPATIENT
Start: 2020-03-11 | End: 2021-01-01

## 2020-03-11 RX ORDER — POLYETHYLENE GLYCOL 3350 17 G/17G
17 POWDER, FOR SOLUTION ORAL DAILY PRN
Qty: 527 G | Refills: 1 | COMMUNITY
Start: 2020-03-11 | End: 2020-04-10

## 2020-03-11 RX ORDER — FUROSEMIDE 40 MG/1
40 TABLET ORAL DAILY
Qty: 60 TABLET | Refills: 3 | Status: SHIPPED | OUTPATIENT
Start: 2020-03-11

## 2020-03-11 RX ADMIN — NYSTATIN AND TRIAMCINOLONE ACETONIDE: 100000; 1 CREAM TOPICAL at 09:14

## 2020-03-11 RX ADMIN — NYSTATIN OINTMENT: 100000 OINTMENT TOPICAL at 09:14

## 2020-03-11 RX ADMIN — HYDROCODONE BITARTRATE AND ACETAMINOPHEN 1 TABLET: 5; 325 TABLET ORAL at 09:13

## 2020-03-11 RX ADMIN — ASPIRIN 81 MG 81 MG: 81 TABLET ORAL at 09:12

## 2020-03-11 RX ADMIN — HYDROCODONE BITARTRATE AND ACETAMINOPHEN 1 TABLET: 5; 325 TABLET ORAL at 13:16

## 2020-03-11 RX ADMIN — DOCUSATE SODIUM 100 MG: 100 CAPSULE, LIQUID FILLED ORAL at 09:12

## 2020-03-11 RX ADMIN — CARVEDILOL 12.5 MG: 6.25 TABLET, FILM COATED ORAL at 09:18

## 2020-03-11 RX ADMIN — HYDROCODONE BITARTRATE AND ACETAMINOPHEN 1 TABLET: 5; 325 TABLET ORAL at 04:36

## 2020-03-11 RX ADMIN — HEPARIN SODIUM 5000 UNITS: 10000 INJECTION INTRAVENOUS; SUBCUTANEOUS at 05:55

## 2020-03-11 RX ADMIN — Medication 2000 UNITS: at 09:12

## 2020-03-11 RX ADMIN — DOCUSATE SODIUM 100 MG: 100 CAPSULE, LIQUID FILLED ORAL at 14:38

## 2020-03-11 RX ADMIN — FERROUS SULFATE TAB 325 MG (65 MG ELEMENTAL FE) 325 MG: 325 (65 FE) TAB at 09:12

## 2020-03-11 RX ADMIN — FUROSEMIDE 40 MG: 40 TABLET ORAL at 09:12

## 2020-03-11 RX ADMIN — CLOPIDOGREL 75 MG: 75 TABLET, FILM COATED ORAL at 09:12

## 2020-03-11 ASSESSMENT — PAIN SCALES - GENERAL
PAINLEVEL_OUTOF10: 0
PAINLEVEL_OUTOF10: 8
PAINLEVEL_OUTOF10: 5
PAINLEVEL_OUTOF10: 9
PAINLEVEL_OUTOF10: 6
PAINLEVEL_OUTOF10: 7
PAINLEVEL_OUTOF10: 0

## 2020-03-11 ASSESSMENT — PAIN DESCRIPTION - ORIENTATION: ORIENTATION: LOWER

## 2020-03-11 ASSESSMENT — PAIN DESCRIPTION - LOCATION
LOCATION: GENERALIZED
LOCATION: BACK

## 2020-03-11 ASSESSMENT — PAIN DESCRIPTION - PAIN TYPE: TYPE: ACUTE PAIN;CHRONIC PAIN

## 2020-03-11 ASSESSMENT — PAIN DESCRIPTION - FREQUENCY: FREQUENCY: CONTINUOUS

## 2020-03-11 NOTE — PROGRESS NOTES
Physical Therapy    Facility/Department: [address] REHAB  Treatment Note     NAME: Claudeen Commander  : 1940  MRN: [id number]    Date of Service: 3/11/2020    Evaluating Therapist: Mio Meek DPT    ROOM: 8079/1006-E  DIAGNOSIS: multiple trauma  PMH: To ED on 20 for back/hip pain. X-ray right hip/CT lumbar spine: Fracture about the superior and inferior pubic rami on the right as well as right sacral alar fracture (Right LC 1 pelvic ring injury). Noted Subacute Fx R L5 Transverse Process. Chronic Partly Healed R L5 Pars Fx and Chronic Stable L2 Compression Fx PMH includes anemia, HTN, OA, osteoporosis, CABG x2, valvular heart disease. PRECAUTIONS: WBAT RLE, Falls, Spinal Neutral,     Pt lives alone in a 1 story home with 3 stairs to enter with a L HR. Bed is on first floor and bath is on first floor. Pt ambulated with rollator independently PTA. Equipment Owned: [employer]; Rollator. Pt reporting shuffled patterning with ambulation PTA. Pt reporting her son lives next door and assists PRN. Initial Evaluation  20 AM PM Short Term Goals Long Term Goals    Was pt agreeable to Eval/treatment? yes yes yes     Does pt have pain?  3/10 right groin at rest  8/10 right groin with activty  6/10 R groin pain  6/10 R groin/thigh pain        Bed Mobility  Rolling: Julissa  Supine to sit: Julissa  Sit to supine: modA  Scooting: Julissa NT NT sup Mod I   Transfers Sit to stand: min/modA  Stand to sit: min/modA  Stand pivot: min/modA with ww Sit to stand: mod I   Stand to sit: mod I  Stand pivot: mod I with rollator Sit to stand: mod I  Stand to sit: mod I   Stand pivot: mod I with ww sba with AAD Mod I with AAD   Ambulation    8 feet with ww with Julissa 150 feet with rollator with mod I  150' with ww with mod I  50 feet with AAD with sba >150 feet with AAD with mod I   Walking 10 feet on uneven surface NT 10' with rollator mod I       Wheel Chair Mobility 76' with bilateral UE sba    50 with bilateral UE sup  >150' with bilateral UE mod I   Car Transfers NT mod I  NT  sup Mod I   Stair negotiation: ascended and descended NT 4 steps with L HR sup (sidewards/backwards approach with bilateral UE support) 4 steps with L HR sup (sidewards/backwards approach with bilateral UE support) 4 steps with bilateral rail with Julissa >4 steps with one rail with sba   Curb Step:   ascended and descended NT  NT 4 inch step with AAD and Julissa 4 inch step with AAD and sba   Picking up object off the floor NT Picked up 1# with ww mod I       BLE ROM WFL (gross knee flexion/extension limitations due to OA/audible crepitus noted)       BLE Strength R LE: hip 3-/5, knee 3+/5, ankle 4/5    L LE: hip 3+/5, knee 4/5, ankle 4/5 R LE: hip 3+/5, knee 4-/5, ankle 4/5    L LE: hip 3+/5, knee 4/5, ankle 4/5      Balance  Sitting: Independent   Standing: min/modA with ww       Date Family Teach Completed TBA       Is additional Family Teaching Needed? Y or N Y       Hindering Progress Pain, OA       PT recommended ELOS 3 weeks       Team's Discharge Plan        Therapist at Team Meeting          Therapeutic Exercise:   AM: 5x STS transfers at w/c x2 reps, no cues for hand placement    Seated LAQ, Hip flexion, AP  2x15 to increase LE strength and ROM for transfers    PM: 5x STS transfers at w/c x2 reps, no cues for hand placement      Patient education  Pt educated on hand placement with transfers, WBAT patterning with curb/stair negotiation. Patient response to education:   Pt verbalized understanding Pt demonstrated skill Pt requires further education in this area   yes yes  Reinforcement      Additional Comments: Reviewed all mobility as per above to prepare for d/c home. Utilized rollator this date in AM with no change in functional mobility or safety deficits noted, functioning the same as when using a ww. Increased pain noted with stair negotiation, requiring increased time when descending. In PM, continued to review all mobility as per above.  Pain and fatigue

## 2020-03-11 NOTE — PROGRESS NOTES
Progress Note    Subjective/   [de-identified]y.o. year old female on the rehab unit for multiple fracture. She offers no new complaints. She is agreeable to plan discharge. No shortness of breath or chest pain. No dizziness. She is tolerating therapy. Objective/   VITALS:  /66   Pulse 66   Temp 98.5 °F (36.9 °C) (Oral)   Resp 16   Ht 5' 2\" (1.575 m)   Wt 180 lb 4.8 oz (81.8 kg)   SpO2 96%   BMI 32.98 kg/m²   24HR INTAKE/OUTPUT:      Intake/Output Summary (Last 24 hours) at 3/10/2020 2342  Last data filed at 3/10/2020 1817  Gross per 24 hour   Intake 1260 ml   Output --   Net 1260 ml     Constitutional:  Alert, awake, no apparent distress   Cardiovascular:  S1, S2 without m/r/g   Respiratory:  CTA B without w/r/r   Abdomen: Positive bowel sounds  Ext: 1+ bilateral LE edema  Neuro: Awake, alert and oriented x3. Good sitting balance. No focal weakness noted in the upper extremities. Functional Level      Date   Status   AE    Comments     Feeding   3/4/20    Mod I              Grooming   3/8/20    S/set up             Bathing   3/8/20   UB: S/SBA (assist to wash back only)    LB: CGA/Min Assist    LH sponge     UB Dressing   3/8/20    Mod I/Sup    seated         LB Dressing   3/9/20    SBA    Reacher     Sock aid       Homemaking   3/10/20    SBA    ww   Light meal prep both seated and standing at counter level           Functional Transfers / Balance:      Date Status DME  Comments   Sit Balance 3/8/20  Supervision       Stand Balance 3/9/20  SBA ww  Grab bar     [x]? Tub  []?  Shower   Transfer 3/6/20    Min A  ww     Commode   Transfer     Toileting  3/10/20         3/10/20    SBA        SBA  W/c to Grab bars     Functional   Mobility 3/10/20   SBA  ww Short distance in pt's room with ww    Other:sit to stand      Stand pivot          On/off recliner  3/10/20         3/9/20         3/5/20    SBA        SBA          SBA  ww        ww        ww                  Functional Exercises / Activity:   BUE strength exercises: 2 # weighted ball 3 sets 10 reps in all planes                                             Red can do bar 3 sets 15 reps                                              Wheel and medium resistive theraputty on table top surface   ROM to RUE with ROM  Shoulder arc with pt using 1 # cuff weight         Sensory / Neuromuscular Re-Education:        Cognitive Skills:    Status Comments   Problem   Solving good      Memory good      Sequencing good      Safety fair +            Scheduled Meds:   lidocaine  1 patch Transdermal Daily    docusate sodium  100 mg Oral TID    aspirin  81 mg Oral Daily    carvedilol  12.5 mg Oral QAM    carvedilol  6.25 mg Oral Nightly    clopidogrel  75 mg Oral Daily    ferrous sulfate  325 mg Oral BID    furosemide  40 mg Oral Daily    heparin (porcine)  5,000 Units Subcutaneous Q12H    magnesium gluconate  250 mg Oral Nightly    mineral oil-hydrophilic petrolatum   Topical Daily    nystatin-triamcinolone   Topical BID    rosuvastatin  20 mg Oral Daily    vitamin D  2,000 Units Oral Daily    nystatin   Topical BID     Continuous Infusions:  PRN Meds:HYDROcodone-acetaminophen, magnesium hydroxide, polyethylene glycol  I/O last 3 completed shifts: In: 1260 [P.O.:1260]  Out: -   No intake/output data recorded. Labs reviewed  CBC: No results for input(s): WBC, HGB, PLT in the last 72 hours. BMP:  No results for input(s): NA, K, CL, CO2, BUN, CREATININE, GLUCOSE in the last 72 hours. Hepatic: No results for input(s): AST, ALT, ALB, BILITOT, ALKPHOS in the last 72 hours. BNP: No results for input(s): BNP in the last 72 hours. Lipids: No results for input(s): CHOL, HDL in the last 72 hours. Invalid input(s): LDLCALCU  INR: No results for input(s): INR in the last 72 hours.     Assessment/  Patient Active Problem List:     Fall due to stumbling     Anemia     HTN (hypertension)     OA (osteoarthritis)     Spinal stenosis     Osteoporosis Chronic pain     Facial contusion     Compression fx, lumbar spine ([facility])     Compression fx, thoracic spine ([facility])     Valvular heart disease     Tachycardia     Closed fracture of nasal bone     Acute kidney injury ([facility] [address]     Acute renal failure (ARF) ([facility])     Closed fracture of sacrum, initial encounter ([facility])     DDD (degenerative disc disease), lumbar     Hypoalbuminemia     Unable to ambulate     Physical deconditioning     CKD (chronic kidney disease)     History of aortic valve replacement     Multiple fracture      Plan/  1. For discharge tomorrow  2. Continue current rehab program  3. Continue pain control  4. Continue current medications  5.  Continue DVT prophylaxis          Bill Christine MD

## 2020-03-11 NOTE — PROGRESS NOTES
Physical Therapy    Facility/Department: [facility] REHAB  Discharge Summary      NAME: Santo Rivas  : 1940  MRN: [id number]    Date of Service: 3/11/2020    Evaluating Therapist: Courtney Brody DPT    ROOM: [facility]/9731-T  DIAGNOSIS: multiple trauma  PMH: To ED on 20 for back/hip pain. X-ray right hip/CT lumbar spine: Fracture about the superior and inferior pubic rami on the right as well as right sacral alar fracture (Right LC 1 pelvic ring injury). Noted Subacute Fx R L5 Transverse Process. Chronic Partly Healed R L5 Pars Fx and Chronic Stable L2 Compression Fx PMH includes anemia, HTN, OA, osteoporosis, CABG x2, valvular heart disease. PRECAUTIONS: WBAT RLE, Falls, Spinal Neutral,     Pt lives alone in a 1 story home with 3 stairs to enter with a L HR. Bed is on first floor and bath is on first floor. Pt ambulated with rollator independently PTA. Equipment Owned: [employer]; Rollator. Pt reporting shuffled patterning with ambulation PTA. Pt reporting her son lives next door and assists PRN.       Initial Evaluation  20 Discharge  3/11/20  Short Term Goals Long Term Goals    Bed Mobility  Rolling: Julissa  Supine to sit: Julissa  Sit to supine: modA  Scooting: Julissa Mod I  sup Mod I   Transfers Sit to stand: min/modA  Stand to sit: min/modA  Stand pivot: min/modA with ww Sit to stand: mod I   Stand to sit: mod I  Stand pivot: mod I with ww vs rollator sba with AAD Mod I with AAD   Ambulation    8 feet with ww with Julissa 150 feet with ww vs rollator with mod I  50 feet with AAD with sba >150 feet with AAD with mod I   Walking 10 feet on uneven surface NT 10' with ww vs rollator mod I      Wheel Chair Mobility 76' with bilateral UE sba  NT 50 with bilateral UE sup  >150' with bilateral UE mod I   Car Transfers NT mod I  sup Mod I   Stair negotiation: ascended and descended NT 4 steps with L HR sup (sidewards/backwards approach with bilateral UE support) 4 steps with bilateral rail with Julissa >4 steps with one rail

## 2020-03-11 NOTE — PROGRESS NOTES
Occupational Therapy  OCCUPATIONAL THERAPY DAILY NOTE/Discharge Summary    Date:3/11/2020  Patient Name: Annalisa Adamson  MRN: [id number]  : 1940  Room: [address] [place], [place] [address]     Diagnosis: Pelvic fx- superior & inferior pubic rami fx- R sacral alar fx & subacute fx R L5  Additional Pertinent Hx: OA, HTN, HLD, CKD, CAD, anemia, chronic back pain, pain pump, hx L2 compression fx- stable    Precautions: falls,WBAT RLE, Spinal Precautions     Functional Assessment:   Date Status AE  Comments   Feeding 3/11/20  Mod I      Grooming 3/11/20  Mod I   Seated at sink for oral hygiene   Bathing 3/11/20 UB: SUP   LB: CGA   LH sponge sponge bath seated/standing at sink with assist to steady balance   UB Dressing 3/11/20  Mod I seated Pt able to lucio bra and pullover shirt   LB Dressing 3/11/20  SBA  Reacher   Sock aid  Pt able to lucio underwear, pants, and slip on shoes using reacher   Homemaking 3/10/20  SBA  ww       Functional Transfers / Balance:   Date Status DME  Comments   Sit Balance 3/11/20  Supervision     Stand Balance 3/11/20  SBA ww  Grab bar    [x] Tub  [] Shower   Transfer 3/11/20    SBA  ww  sit down method onto extended tub bench, increased time to swing BLEs into tub   Commode   Transfer    Toileting  3/11/20       3/11/20    SBA      SBA  W/c to Grab bars    Functional   Mobility 3/11/20   SBA  ww To/from bathroom AM   Other:sit to stand     Stand pivot        On/off recliner  3/10/20       3/9/20       3/5/20    SBA      SBA        SBA  ww      ww      ww             Functional Exercises / Activity:  Pt sitting in chair upon arrival to . Completed ADL during 1st session, see above for assessment. Pt appeared to have tolerated session well. Pt continues to be limited by pain. Pt seen this PM in OT gym. Participated in armbike exercise, seated, 2x5 mins, to increase endurance for ease with ADLs.  Participated in word search while wearing B 2# wrist weights to increase leisure and BUE strength for ease with functional transfers. Sensory / Neuromuscular Re-Education:      Cognitive Skills:   Status Comments   Problem   Solving good     Memory good     Sequencing good     Safety fair +      Visual Perception:    Education:  Pt was educated on pacing and energy conservation techniques to utilize during ADL's and functional activities     [x] Family teach completed on:  3/6/20 Pt's son present for family teach session. He was updated on pt's current self care levels and need for assist when completing tub/shower level bathing. Pt completed functional transfers with ww throughout therapy apt setting with son observing. Pt's son verbalized good understanding and states that himself and other family members with assist pt at discharge as needed. Pain Level:   0/10    Additional Notes:   2/23/2020- Pt complained that she is not sleeping well. When pt questioned further pt stated she normally sleeps in a recliner @ home as she is unable to get in or out of bed prior to her admission. Patient has made good  progress during treatment sessions toward set goals. Therapy emphasis to obtain goals: Current Treatment Recommendations: Pain Management, Positioning, Gait Training, Safety Education & Training, Balance Training, Patient/Caregiver Education & Training, Self-Care / ADL, Functional Mobility Training, Equipment Evaluation, Education, & procurement, Home Management Training, Endurance Training     [] Continue with current OT Plan of care. [x] Prepare for Discharge     DISCHARGE RECOMMENDATIONS  OCCUPATIONAL THERAPY DISCHARGE SUMMARY    Discontinue Occupational Therapy intervention. Pt has:   [] met all set goals. [x] made good progress toward set goals. [] has made slow progress toward goals and would benefit from rehab setting change.  [] had a medical decline and therefore was transferred off the Rehab unit.     Long term goals  Time Frame for Long term goals : 3 weeks to address above problem areas  Long term goal 1: Pt demo Mod i to eat all meals  Long term goal 2: Pt demo Mod I grooming seated   Long term goal 3: Pt demo SBA bathing @ shower level seated or sponge bathing both seated & standing  Long term goal 4: Pt demo independent UE & Mod I LE dress with AE as needed  Long term goal 5: Pt demo Mod I commode trf with appropriate DME to ensure pt safety  Long term goals 6: Pt demo SBA tub trf with appropriate DME to ensure pt safety  Long term goal 7: Pt demo Mod I light homemaking activity & demo G- safety & insight  Long term goal 8: Pt demo G- endurance for a 20-30 minute functional activity  Long term goal 9: Pt demo G- safety, insight, reasoning & judgement during functionally based tasks    The Patient has received education on:  [x]Transfer Safety [x]Compensatory tech for ADLs [x]AE training [x]DME training [x]Energy Conservation [x]Home / Kitchen Safety  [x]Fall Prevention  []Other:    Family training was completed: yes    Recommended DME:  BSSARYA ,kathy     Post Discharge Care:   []Home Independently  [x]Home with 24hr Care / Supervision []Home with Partial Supervision [x]Home with Home Health OT []Home with Out Pt OT []Other: ___   Comments:         Time in Time out Tx Time Breakdown  Variance:   First Session  8:10 8:55 [x] Individual Tx- 45  [] Concurrent Tx-  [] Co-Tx -   [] Group Tx -   [] Time Missed -     Second Session 1:45 2:30 [x] Individual Tx-20  [x] Concurrent Tx -25   [] Co-Tx -   [] Group Tx -   [] Time Missed -     Third Session    [] Individual Tx-   [] Concurrent Tx -  [] Co-Tx -   [] Group Tx -   [] Time Missed -         Total Tx Time: [address] [id number]

## 2020-03-11 NOTE — DISCHARGE SUMMARY
Rehabilitation Discharge Summary     Patient Identification  Lee Davis is a [de-identified] y.o. female. :  1940  Admit Date:  2020  Discharge date and time: 3/11/2020  3:53 PM   Attending Provider: Leann att. providers found                                      Discharge Physician: Germain Dennis    Admission Diagnoses: Multiple fracture [T07. XXXA]    Discharge Diagnoses: Multiple trauma, pelvic fracture, multiple compression fractures, acute pain, chronic pain syndrome, osteoporosis, osteoarthritis, hypertension, mobility and self-care deficit    Admission Functional Status:      Initial Evaluation  20     Short Term Goals Long Term Goals    Was pt agreeable to Eval/treatment? yes           Does pt have pain?  3/10 right groin at rest  8/10 right groin with activty            Bed Mobility  Rolling: Julissa  Supine to sit: Julissa  Sit to supine: modA  Scooting: Julissa     sup Mod I   Transfers Sit to stand: min/modA  Stand to sit: min/modA  Stand pivot: min/modA with ww     sba with AAD Mod I with AAD   Ambulation    8 feet with ww with Julissa     50 feet with AAD with sba >150 feet with AAD with mod I   Walking 10 feet on uneven surface NT           Wheel Chair Mobility 76' with bilateral UE sba      50 with bilateral UE sup  >150' with bilateral UE mod I   Car Transfers NT     sup Mod I   Stair negotiation: ascended and descended NT     4 steps with bilateral rail with Julissa >4 steps with one rail with sba   Curb Step:   ascended and descended NT     4 inch step with AAD and Julissa 4 inch step with AAD and sba   Picking up object off the floor NT           BLE ROM WFL (gross knee flexion/extension limitations due to OA/audible crepitus noted)           BLE Strength R LE: hip 3-/5, knee 3+/5, ankle 4/5     L LE: hip 3+/5, knee 4/5, ankle 4/5           Balance  Sitting: Independent   Standing: min/modA with ww                 Balance  Sitting Balance: Supervision  Standing Balance: Minimal assistance(rw)  Toilet pivot: min/modA with ww Sit to stand: mod I   Stand to sit: mod I  Stand pivot: mod I with ww vs rollator sba with AAD Mod I with AAD   Ambulation    8 feet with ww with Julissa 150 feet with ww vs rollator with mod I  50 feet with AAD with sba >150 feet with AAD with mod I   Walking 10 feet on uneven surface NT 10' with ww vs rollator mod I        Wheel Chair Mobility 76' with bilateral UE sba  NT 50 with bilateral UE sup  >150' with bilateral UE mod I   Car Transfers NT mod I  sup Mod I   Stair negotiation: ascended and descended NT 4 steps with L HR sup (sidewards/backwards approach with bilateral UE support) 4 steps with bilateral rail with Julissa >4 steps with one rail with sba   Curb Step:   ascended and descended NT 2 in curb step with ww mod I 4 inch step with AAD and Julissa 4 inch step with AAD and sba   Picking up object off the floor NT Picked up 1# with reach at ww mod I        BLE ROM WFL (gross knee flexion/extension limitations due to OA/audible crepitus noted) WFL (gross knee flexion/extension limitations due to OA/audible crepitus noted)       BLE Strength R LE: hip 3-/5, knee 3+/5, ankle 4/5     L LE: hip 3+/5, knee 4/5, ankle 4/5 R LE: hip 3+/5, knee 4-/5, ankle 4/5     L LE: hip 3+/5, knee 4/5, ankle 4/5       Balance  Sitting: Independent   Standing: min/modA with ww Sitting: Independent   Standing: mod I with ww vs rollator              Date   Status   AE    Comments     Feeding   3/11/20    Mod I              Grooming   3/11/20    Mod I        Seated at sink for oral hygiene     Bathing   3/11/20   UB: SUP     LB: CGA    LH sponge sponge bath seated/standing at sink with assist to steady balance   UB Dressing   3/11/20    Mod I   seated   Pt able to lucio bra and pullover shirt     LB Dressing   3/11/20    SBA    Reacher     Sock aid  Pt able to lucio underwear, pants, and slip on shoes using reacher     Homemaking   3/10/20    SBA    ww              Functional Transfers / Balance:      Date Status DME

## 2020-11-03 PROBLEM — N17.9 ACUTE RENAL FAILURE (ARF) (HCC): Status: RESOLVED | Noted: 2017-12-28 | Resolved: 2020-11-03

## 2021-01-01 ENCOUNTER — APPOINTMENT (OUTPATIENT)
Dept: CT IMAGING | Age: 81
DRG: 556 | End: 2021-01-01
Payer: MEDICARE

## 2021-01-01 ENCOUNTER — HOSPITAL ENCOUNTER (OUTPATIENT)
Dept: GENERAL RADIOLOGY | Age: 81
Discharge: HOME | End: 2021-08-26
Payer: MEDICARE

## 2021-01-01 ENCOUNTER — HOSPITAL ENCOUNTER (INPATIENT)
Age: 81
LOS: 1 days | Discharge: NURSING, REHAB OR LONG-TERM CARE | DRG: 556 | End: 2021-08-07
Attending: STUDENT IN AN ORGANIZED HEALTH CARE EDUCATION/TRAINING PROGRAM | Admitting: FAMILY MEDICINE
Payer: MEDICARE

## 2021-01-01 ENCOUNTER — APPOINTMENT (OUTPATIENT)
Dept: ULTRASOUND IMAGING | Age: 81
DRG: 556 | End: 2021-01-01
Payer: MEDICARE

## 2021-01-01 ENCOUNTER — OFFICE VISIT (OUTPATIENT)
Dept: ORTHOPEDIC SURGERY | Age: 81
End: 2021-01-01
Payer: MEDICARE

## 2021-01-01 ENCOUNTER — TELEPHONE (OUTPATIENT)
Dept: ORTHOPEDIC SURGERY | Age: 81
End: 2021-01-01

## 2021-01-01 VITALS
BODY MASS INDEX: 34.36 KG/M2 | HEART RATE: 80 BPM | WEIGHT: 175 LBS | TEMPERATURE: 97.8 F | RESPIRATION RATE: 18 BRPM | DIASTOLIC BLOOD PRESSURE: 62 MMHG | OXYGEN SATURATION: 97 % | HEIGHT: 60 IN | SYSTOLIC BLOOD PRESSURE: 140 MMHG

## 2021-01-01 VITALS — TEMPERATURE: 97.2 F

## 2021-01-01 DIAGNOSIS — R10.2 PELVIC PAIN: Primary | ICD-10-CM

## 2021-01-01 DIAGNOSIS — R10.2 PELVIC PAIN: ICD-10-CM

## 2021-01-01 DIAGNOSIS — R27.0 ATAXIA: ICD-10-CM

## 2021-01-01 DIAGNOSIS — M25.559 HIP PAIN: ICD-10-CM

## 2021-01-01 DIAGNOSIS — S32.415A CLOSED NONDISPLACED FRACTURE OF ANTERIOR WALL OF LEFT ACETABULUM, INITIAL ENCOUNTER (HCC): Primary | ICD-10-CM

## 2021-01-01 DIAGNOSIS — M25.552 LEFT HIP PAIN: Primary | ICD-10-CM

## 2021-01-01 DIAGNOSIS — S32.415D CLOSED NONDISPLACED FRACTURE OF ANTERIOR WALL OF LEFT ACETABULUM WITH ROUTINE HEALING: ICD-10-CM

## 2021-01-01 LAB
ALBUMIN SERPL-MCNC: 3.1 G/DL (ref 3.5–5.2)
ALBUMIN SERPL-MCNC: 3.3 G/DL (ref 3.5–5.2)
ALBUMIN SERPL-MCNC: 3.6 G/DL (ref 3.5–5.2)
ALP BLD-CCNC: 103 U/L (ref 35–104)
ALP BLD-CCNC: 118 U/L (ref 35–104)
ALP BLD-CCNC: 89 U/L (ref 35–104)
ALT SERPL-CCNC: 10 U/L (ref 0–32)
ALT SERPL-CCNC: 8 U/L (ref 0–32)
ALT SERPL-CCNC: 8 U/L (ref 0–32)
ANION GAP SERPL CALCULATED.3IONS-SCNC: 10 MMOL/L (ref 7–16)
ANION GAP SERPL CALCULATED.3IONS-SCNC: 11 MMOL/L (ref 7–16)
ANION GAP SERPL CALCULATED.3IONS-SCNC: 7 MMOL/L (ref 7–16)
AST SERPL-CCNC: 13 U/L (ref 0–31)
AST SERPL-CCNC: 14 U/L (ref 0–31)
AST SERPL-CCNC: 15 U/L (ref 0–31)
BACTERIA: ABNORMAL /HPF
BASOPHILS ABSOLUTE: 0.04 E9/L (ref 0–0.2)
BASOPHILS ABSOLUTE: 0.04 E9/L (ref 0–0.2)
BASOPHILS ABSOLUTE: 0.05 E9/L (ref 0–0.2)
BASOPHILS RELATIVE PERCENT: 0.3 % (ref 0–2)
BASOPHILS RELATIVE PERCENT: 0.4 % (ref 0–2)
BASOPHILS RELATIVE PERCENT: 0.5 % (ref 0–2)
BILIRUB SERPL-MCNC: 0.3 MG/DL (ref 0–1.2)
BILIRUB SERPL-MCNC: 0.4 MG/DL (ref 0–1.2)
BILIRUB SERPL-MCNC: <0.2 MG/DL (ref 0–1.2)
BILIRUBIN URINE: NEGATIVE
BLOOD CULTURE, ROUTINE: NORMAL
BLOOD, URINE: NEGATIVE
BUN BLDV-MCNC: 24 MG/DL (ref 6–23)
BUN BLDV-MCNC: 27 MG/DL (ref 6–23)
BUN BLDV-MCNC: 40 MG/DL (ref 6–23)
C-REACTIVE PROTEIN: 1.1 MG/DL (ref 0–0.4)
CALCIUM SERPL-MCNC: 8.1 MG/DL (ref 8.6–10.2)
CALCIUM SERPL-MCNC: 9.4 MG/DL (ref 8.6–10.2)
CALCIUM SERPL-MCNC: 9.5 MG/DL (ref 8.6–10.2)
CHLORIDE BLD-SCNC: 105 MMOL/L (ref 98–107)
CHLORIDE BLD-SCNC: 106 MMOL/L (ref 98–107)
CHLORIDE BLD-SCNC: 110 MMOL/L (ref 98–107)
CLARITY: CLEAR
CO2: 24 MMOL/L (ref 22–29)
CO2: 25 MMOL/L (ref 22–29)
CO2: 25 MMOL/L (ref 22–29)
COLOR: YELLOW
CREAT SERPL-MCNC: 1.2 MG/DL (ref 0.5–1)
CREAT SERPL-MCNC: 1.2 MG/DL (ref 0.5–1)
CREAT SERPL-MCNC: 1.5 MG/DL (ref 0.5–1)
CULTURE, BLOOD 2: NORMAL
EOSINOPHILS ABSOLUTE: 0.36 E9/L (ref 0.05–0.5)
EOSINOPHILS ABSOLUTE: 0.36 E9/L (ref 0.05–0.5)
EOSINOPHILS ABSOLUTE: 0.42 E9/L (ref 0.05–0.5)
EOSINOPHILS RELATIVE PERCENT: 3.6 % (ref 0–6)
EOSINOPHILS RELATIVE PERCENT: 3.9 % (ref 0–6)
EOSINOPHILS RELATIVE PERCENT: 3.9 % (ref 0–6)
GFR AFRICAN AMERICAN: 40
GFR AFRICAN AMERICAN: 52
GFR AFRICAN AMERICAN: 52
GFR NON-AFRICAN AMERICAN: 33 ML/MIN/1.73
GFR NON-AFRICAN AMERICAN: 43 ML/MIN/1.73
GFR NON-AFRICAN AMERICAN: 43 ML/MIN/1.73
GLUCOSE BLD-MCNC: 105 MG/DL (ref 74–99)
GLUCOSE BLD-MCNC: 92 MG/DL (ref 74–99)
GLUCOSE BLD-MCNC: 95 MG/DL (ref 74–99)
GLUCOSE URINE: NEGATIVE MG/DL
HCT VFR BLD CALC: 29.8 % (ref 34–48)
HCT VFR BLD CALC: 31.9 % (ref 34–48)
HCT VFR BLD CALC: 34.3 % (ref 34–48)
HEMOGLOBIN: 10.5 G/DL (ref 11.5–15.5)
HEMOGLOBIN: 9.4 G/DL (ref 11.5–15.5)
HEMOGLOBIN: 9.8 G/DL (ref 11.5–15.5)
IMMATURE GRANULOCYTES #: 0.04 E9/L
IMMATURE GRANULOCYTES #: 0.04 E9/L
IMMATURE GRANULOCYTES #: 0.06 E9/L
IMMATURE GRANULOCYTES %: 0.4 % (ref 0–5)
IMMATURE GRANULOCYTES %: 0.4 % (ref 0–5)
IMMATURE GRANULOCYTES %: 0.5 % (ref 0–5)
KETONES, URINE: NEGATIVE MG/DL
LEUKOCYTE ESTERASE, URINE: ABNORMAL
LYMPHOCYTES ABSOLUTE: 1.14 E9/L (ref 1.5–4)
LYMPHOCYTES ABSOLUTE: 1.28 E9/L (ref 1.5–4)
LYMPHOCYTES ABSOLUTE: 1.52 E9/L (ref 1.5–4)
LYMPHOCYTES RELATIVE PERCENT: 13.9 % (ref 20–42)
LYMPHOCYTES RELATIVE PERCENT: 16.3 % (ref 20–42)
LYMPHOCYTES RELATIVE PERCENT: 9.8 % (ref 20–42)
MAGNESIUM: 1.6 MG/DL (ref 1.6–2.6)
MCH RBC QN AUTO: 27.6 PG (ref 26–35)
MCH RBC QN AUTO: 27.9 PG (ref 26–35)
MCH RBC QN AUTO: 28.5 PG (ref 26–35)
MCHC RBC AUTO-ENTMCNC: 30.6 % (ref 32–34.5)
MCHC RBC AUTO-ENTMCNC: 30.7 % (ref 32–34.5)
MCHC RBC AUTO-ENTMCNC: 31.5 % (ref 32–34.5)
MCV RBC AUTO: 90.3 FL (ref 80–99.9)
MCV RBC AUTO: 90.3 FL (ref 80–99.9)
MCV RBC AUTO: 90.9 FL (ref 80–99.9)
MONOCYTES ABSOLUTE: 0.85 E9/L (ref 0.1–0.95)
MONOCYTES ABSOLUTE: 0.91 E9/L (ref 0.1–0.95)
MONOCYTES ABSOLUTE: 0.95 E9/L (ref 0.1–0.95)
MONOCYTES RELATIVE PERCENT: 10.2 % (ref 2–12)
MONOCYTES RELATIVE PERCENT: 7.8 % (ref 2–12)
MONOCYTES RELATIVE PERCENT: 9.2 % (ref 2–12)
NEUTROPHILS ABSOLUTE: 6.38 E9/L (ref 1.8–7.3)
NEUTROPHILS ABSOLUTE: 6.64 E9/L (ref 1.8–7.3)
NEUTROPHILS ABSOLUTE: 9.1 E9/L (ref 1.8–7.3)
NEUTROPHILS RELATIVE PERCENT: 68.7 % (ref 43–80)
NEUTROPHILS RELATIVE PERCENT: 72.2 % (ref 43–80)
NEUTROPHILS RELATIVE PERCENT: 78 % (ref 43–80)
NITRITE, URINE: NEGATIVE
PDW BLD-RTO: 15.8 FL (ref 11.5–15)
PDW BLD-RTO: 15.8 FL (ref 11.5–15)
PDW BLD-RTO: 15.9 FL (ref 11.5–15)
PH UA: 6.5 (ref 5–9)
PLATELET # BLD: 206 E9/L (ref 130–450)
PLATELET # BLD: 226 E9/L (ref 130–450)
PLATELET # BLD: 238 E9/L (ref 130–450)
PMV BLD AUTO: 10.8 FL (ref 7–12)
PMV BLD AUTO: 11.1 FL (ref 7–12)
PMV BLD AUTO: 11.3 FL (ref 7–12)
POTASSIUM REFLEX MAGNESIUM: 3.3 MMOL/L (ref 3.5–5)
POTASSIUM REFLEX MAGNESIUM: 4.5 MMOL/L (ref 3.5–5)
POTASSIUM SERPL-SCNC: 4 MMOL/L (ref 3.5–5)
PROTEIN UA: NEGATIVE MG/DL
RBC # BLD: 3.3 E12/L (ref 3.5–5.5)
RBC # BLD: 3.51 E12/L (ref 3.5–5.5)
RBC # BLD: 3.8 E12/L (ref 3.5–5.5)
RBC UA: ABNORMAL /HPF (ref 0–2)
SARS-COV-2, NAAT: NOT DETECTED
SEDIMENTATION RATE, ERYTHROCYTE: 45 MM/HR (ref 0–20)
SODIUM BLD-SCNC: 141 MMOL/L (ref 132–146)
SPECIFIC GRAVITY UA: 1.01 (ref 1–1.03)
TOTAL PROTEIN: 5.3 G/DL (ref 6.4–8.3)
TOTAL PROTEIN: 5.9 G/DL (ref 6.4–8.3)
TOTAL PROTEIN: 6.2 G/DL (ref 6.4–8.3)
URINE CULTURE, ROUTINE: NORMAL
UROBILINOGEN, URINE: 0.2 E.U./DL
WBC # BLD: 11.7 E9/L (ref 4.5–11.5)
WBC # BLD: 9.2 E9/L (ref 4.5–11.5)
WBC # BLD: 9.3 E9/L (ref 4.5–11.5)
WBC UA: ABNORMAL /HPF (ref 0–5)

## 2021-01-01 PROCEDURE — 99212 OFFICE O/P EST SF 10 MIN: CPT | Performed by: ORTHOPAEDIC SURGERY

## 2021-01-01 PROCEDURE — 6370000000 HC RX 637 (ALT 250 FOR IP): Performed by: FAMILY MEDICINE

## 2021-01-01 PROCEDURE — 99203 OFFICE O/P NEW LOW 30 MIN: CPT | Performed by: ORTHOPAEDIC SURGERY

## 2021-01-01 PROCEDURE — 97165 OT EVAL LOW COMPLEX 30 MIN: CPT

## 2021-01-01 PROCEDURE — 96372 THER/PROPH/DIAG INJ SC/IM: CPT

## 2021-01-01 PROCEDURE — 85651 RBC SED RATE NONAUTOMATED: CPT

## 2021-01-01 PROCEDURE — 85025 COMPLETE CBC W/AUTO DIFF WBC: CPT

## 2021-01-01 PROCEDURE — 6370000000 HC RX 637 (ALT 250 FOR IP): Performed by: STUDENT IN AN ORGANIZED HEALTH CARE EDUCATION/TRAINING PROGRAM

## 2021-01-01 PROCEDURE — 96376 TX/PRO/DX INJ SAME DRUG ADON: CPT

## 2021-01-01 PROCEDURE — 86140 C-REACTIVE PROTEIN: CPT

## 2021-01-01 PROCEDURE — 97535 SELF CARE MNGMENT TRAINING: CPT

## 2021-01-01 PROCEDURE — 27220 TREAT HIP SOCKET FRACTURE: CPT | Performed by: ORTHOPAEDIC SURGERY

## 2021-01-01 PROCEDURE — 6360000002 HC RX W HCPCS: Performed by: FAMILY MEDICINE

## 2021-01-01 PROCEDURE — G8400 PT W/DXA NO RESULTS DOC: HCPCS | Performed by: ORTHOPAEDIC SURGERY

## 2021-01-01 PROCEDURE — 93975 VASCULAR STUDY: CPT

## 2021-01-01 PROCEDURE — 1036F TOBACCO NON-USER: CPT | Performed by: ORTHOPAEDIC SURGERY

## 2021-01-01 PROCEDURE — 83735 ASSAY OF MAGNESIUM: CPT

## 2021-01-01 PROCEDURE — 2500000003 HC RX 250 WO HCPCS: Performed by: STUDENT IN AN ORGANIZED HEALTH CARE EDUCATION/TRAINING PROGRAM

## 2021-01-01 PROCEDURE — 87635 SARS-COV-2 COVID-19 AMP PRB: CPT

## 2021-01-01 PROCEDURE — 72190 X-RAY EXAM OF PELVIS: CPT

## 2021-01-01 PROCEDURE — 80053 COMPREHEN METABOLIC PANEL: CPT

## 2021-01-01 PROCEDURE — 96374 THER/PROPH/DIAG INJ IV PUSH: CPT

## 2021-01-01 PROCEDURE — 36415 COLL VENOUS BLD VENIPUNCTURE: CPT

## 2021-01-01 PROCEDURE — G0378 HOSPITAL OBSERVATION PER HR: HCPCS

## 2021-01-01 PROCEDURE — 6370000000 HC RX 637 (ALT 250 FOR IP): Performed by: PHYSICIAN ASSISTANT

## 2021-01-01 PROCEDURE — 99284 EMERGENCY DEPT VISIT MOD MDM: CPT

## 2021-01-01 PROCEDURE — 73502 X-RAY EXAM HIP UNI 2-3 VIEWS: CPT

## 2021-01-01 PROCEDURE — 81001 URINALYSIS AUTO W/SCOPE: CPT

## 2021-01-01 PROCEDURE — G8427 DOCREV CUR MEDS BY ELIG CLIN: HCPCS | Performed by: ORTHOPAEDIC SURGERY

## 2021-01-01 PROCEDURE — 87040 BLOOD CULTURE FOR BACTERIA: CPT

## 2021-01-01 PROCEDURE — 87088 URINE BACTERIA CULTURE: CPT

## 2021-01-01 PROCEDURE — G8417 CALC BMI ABV UP PARAM F/U: HCPCS | Performed by: ORTHOPAEDIC SURGERY

## 2021-01-01 PROCEDURE — 97161 PT EVAL LOW COMPLEX 20 MIN: CPT

## 2021-01-01 PROCEDURE — 2580000003 HC RX 258: Performed by: FAMILY MEDICINE

## 2021-01-01 PROCEDURE — 4040F PNEUMOC VAC/ADMIN/RCVD: CPT | Performed by: ORTHOPAEDIC SURGERY

## 2021-01-01 PROCEDURE — 1111F DSCHRG MED/CURRENT MED MERGE: CPT | Performed by: ORTHOPAEDIC SURGERY

## 2021-01-01 PROCEDURE — 72192 CT PELVIS W/O DYE: CPT

## 2021-01-01 PROCEDURE — 1200000000 HC SEMI PRIVATE

## 2021-01-01 PROCEDURE — 1123F ACP DISCUSS/DSCN MKR DOCD: CPT | Performed by: ORTHOPAEDIC SURGERY

## 2021-01-01 PROCEDURE — 76770 US EXAM ABDO BACK WALL COMP: CPT

## 2021-01-01 PROCEDURE — 97530 THERAPEUTIC ACTIVITIES: CPT

## 2021-01-01 PROCEDURE — 1090F PRES/ABSN URINE INCON ASSESS: CPT | Performed by: ORTHOPAEDIC SURGERY

## 2021-01-01 RX ORDER — LISINOPRIL 20 MG/1
20 TABLET ORAL DAILY
Status: DISCONTINUED | OUTPATIENT
Start: 2021-01-01 | End: 2021-01-01 | Stop reason: HOSPADM

## 2021-01-01 RX ORDER — HYDRALAZINE HYDROCHLORIDE 20 MG/ML
5 INJECTION INTRAMUSCULAR; INTRAVENOUS EVERY 6 HOURS PRN
Status: DISCONTINUED | OUTPATIENT
Start: 2021-01-01 | End: 2021-01-01 | Stop reason: HOSPADM

## 2021-01-01 RX ORDER — ASPIRIN 81 MG/1
81 TABLET ORAL DAILY
Status: DISCONTINUED | OUTPATIENT
Start: 2021-01-01 | End: 2021-01-01 | Stop reason: HOSPADM

## 2021-01-01 RX ORDER — SODIUM CHLORIDE 9 MG/ML
25 INJECTION, SOLUTION INTRAVENOUS PRN
Status: DISCONTINUED | OUTPATIENT
Start: 2021-01-01 | End: 2021-01-01 | Stop reason: HOSPADM

## 2021-01-01 RX ORDER — TRAMADOL HYDROCHLORIDE 50 MG/1
50 TABLET ORAL EVERY 6 HOURS PRN
Status: DISCONTINUED | OUTPATIENT
Start: 2021-01-01 | End: 2021-01-01 | Stop reason: HOSPADM

## 2021-01-01 RX ORDER — M-VIT,TX,IRON,MINS/CALC/FOLIC 27MG-0.4MG
1 TABLET ORAL DAILY
Status: DISCONTINUED | OUTPATIENT
Start: 2021-01-01 | End: 2021-01-01 | Stop reason: HOSPADM

## 2021-01-01 RX ORDER — ONDANSETRON 2 MG/ML
4 INJECTION INTRAMUSCULAR; INTRAVENOUS EVERY 6 HOURS PRN
Status: DISCONTINUED | OUTPATIENT
Start: 2021-01-01 | End: 2021-01-01 | Stop reason: HOSPADM

## 2021-01-01 RX ORDER — SENNA PLUS 8.6 MG/1
1 TABLET ORAL DAILY PRN
Status: DISCONTINUED | OUTPATIENT
Start: 2021-01-01 | End: 2021-01-01 | Stop reason: HOSPADM

## 2021-01-01 RX ORDER — ROSUVASTATIN CALCIUM 20 MG/1
20 TABLET, COATED ORAL EVERY OTHER DAY
Status: DISCONTINUED | OUTPATIENT
Start: 2021-01-01 | End: 2021-01-01 | Stop reason: HOSPADM

## 2021-01-01 RX ORDER — SODIUM CHLORIDE 9 MG/ML
INJECTION, SOLUTION INTRAVENOUS CONTINUOUS
Status: DISCONTINUED | OUTPATIENT
Start: 2021-01-01 | End: 2021-01-01

## 2021-01-01 RX ORDER — LEVOTHYROXINE SODIUM 0.05 MG/1
50 TABLET ORAL DAILY
Status: DISCONTINUED | OUTPATIENT
Start: 2021-01-01 | End: 2021-01-01 | Stop reason: HOSPADM

## 2021-01-01 RX ORDER — POTASSIUM CHLORIDE 20 MEQ/1
20 TABLET, EXTENDED RELEASE ORAL DAILY
Qty: 7 TABLET | Refills: 0 | DISCHARGE
Start: 2021-01-01 | End: 2021-01-01

## 2021-01-01 RX ORDER — AMLODIPINE BESYLATE 5 MG/1
10 TABLET ORAL ONCE
Status: COMPLETED | OUTPATIENT
Start: 2021-01-01 | End: 2021-01-01

## 2021-01-01 RX ORDER — POTASSIUM CHLORIDE 20 MEQ/1
40 TABLET, EXTENDED RELEASE ORAL 2 TIMES DAILY WITH MEALS
Status: DISCONTINUED | OUTPATIENT
Start: 2021-01-01 | End: 2021-01-01 | Stop reason: HOSPADM

## 2021-01-01 RX ORDER — SODIUM CHLORIDE 0.9 % (FLUSH) 0.9 %
5-40 SYRINGE (ML) INJECTION PRN
Status: DISCONTINUED | OUTPATIENT
Start: 2021-01-01 | End: 2021-01-01 | Stop reason: HOSPADM

## 2021-01-01 RX ORDER — OXYCODONE HYDROCHLORIDE AND ACETAMINOPHEN 5; 325 MG/1; MG/1
1 TABLET ORAL ONCE
Status: COMPLETED | OUTPATIENT
Start: 2021-01-01 | End: 2021-01-01

## 2021-01-01 RX ORDER — SODIUM CHLORIDE 0.9 % (FLUSH) 0.9 %
10 SYRINGE (ML) INJECTION PRN
Status: DISCONTINUED | OUTPATIENT
Start: 2021-01-01 | End: 2021-01-01 | Stop reason: SDUPTHER

## 2021-01-01 RX ORDER — HEPARIN SODIUM 10000 [USP'U]/ML
5000 INJECTION, SOLUTION INTRAVENOUS; SUBCUTANEOUS EVERY 8 HOURS SCHEDULED
Status: DISCONTINUED | OUTPATIENT
Start: 2021-01-01 | End: 2021-01-01 | Stop reason: HOSPADM

## 2021-01-01 RX ORDER — CLOPIDOGREL BISULFATE 75 MG/1
75 TABLET ORAL DAILY
Status: DISCONTINUED | OUTPATIENT
Start: 2021-01-01 | End: 2021-01-01 | Stop reason: HOSPADM

## 2021-01-01 RX ORDER — TRAMADOL HYDROCHLORIDE 50 MG/1
50 TABLET ORAL EVERY 6 HOURS PRN
Qty: 12 TABLET | Refills: 0 | Status: SHIPPED | OUTPATIENT
Start: 2021-01-01 | End: 2021-01-01

## 2021-01-01 RX ORDER — ACETAMINOPHEN 650 MG/1
650 SUPPOSITORY RECTAL EVERY 6 HOURS PRN
Status: DISCONTINUED | OUTPATIENT
Start: 2021-01-01 | End: 2021-01-01 | Stop reason: HOSPADM

## 2021-01-01 RX ORDER — ONDANSETRON 4 MG/1
4 TABLET, ORALLY DISINTEGRATING ORAL EVERY 8 HOURS PRN
Status: DISCONTINUED | OUTPATIENT
Start: 2021-01-01 | End: 2021-01-01 | Stop reason: HOSPADM

## 2021-01-01 RX ORDER — FERROUS SULFATE 325(65) MG
325 TABLET ORAL 2 TIMES DAILY
Status: DISCONTINUED | OUTPATIENT
Start: 2021-01-01 | End: 2021-01-01 | Stop reason: HOSPADM

## 2021-01-01 RX ORDER — POLYETHYLENE GLYCOL 3350 17 G/17G
17 POWDER, FOR SOLUTION ORAL DAILY PRN
Status: DISCONTINUED | OUTPATIENT
Start: 2021-01-01 | End: 2021-01-01 | Stop reason: HOSPADM

## 2021-01-01 RX ORDER — CARVEDILOL 25 MG/1
12.5 TABLET ORAL EVERY MORNING
Status: DISCONTINUED | OUTPATIENT
Start: 2021-01-01 | End: 2021-01-01 | Stop reason: HOSPADM

## 2021-01-01 RX ORDER — POLYETHYLENE GLYCOL 3350 17 G/17G
17 POWDER, FOR SOLUTION ORAL DAILY PRN
Qty: 527 G | Refills: 1 | DISCHARGE
Start: 2021-01-01 | End: 2021-01-01

## 2021-01-01 RX ORDER — CYCLOBENZAPRINE HCL 10 MG
10 TABLET ORAL 3 TIMES DAILY PRN
Status: DISCONTINUED | OUTPATIENT
Start: 2021-01-01 | End: 2021-01-01 | Stop reason: HOSPADM

## 2021-01-01 RX ORDER — SENNA PLUS 8.6 MG/1
1 TABLET ORAL DAILY PRN
DISCHARGE
Start: 2021-01-01 | End: 2021-01-01

## 2021-01-01 RX ORDER — ACETAMINOPHEN 325 MG/1
650 TABLET ORAL EVERY 6 HOURS PRN
Status: DISCONTINUED | OUTPATIENT
Start: 2021-01-01 | End: 2021-01-01 | Stop reason: HOSPADM

## 2021-01-01 RX ORDER — SODIUM CHLORIDE 0.9 % (FLUSH) 0.9 %
5-40 SYRINGE (ML) INJECTION EVERY 12 HOURS SCHEDULED
Status: DISCONTINUED | OUTPATIENT
Start: 2021-01-01 | End: 2021-01-01 | Stop reason: HOSPADM

## 2021-01-01 RX ORDER — IBUPROFEN 200 MG
200 TABLET ORAL EVERY 6 HOURS PRN
Status: ON HOLD | COMMUNITY
End: 2021-01-01 | Stop reason: HOSPADM

## 2021-01-01 RX ORDER — LISINOPRIL 5 MG/1
5 TABLET ORAL DAILY
Status: DISCONTINUED | OUTPATIENT
Start: 2021-01-01 | End: 2021-01-01

## 2021-01-01 RX ORDER — CYCLOBENZAPRINE HCL 10 MG
10 TABLET ORAL 3 TIMES DAILY PRN
DISCHARGE
Start: 2021-01-01 | End: 2021-01-01

## 2021-01-01 RX ORDER — FUROSEMIDE 40 MG/1
40 TABLET ORAL DAILY
Status: DISCONTINUED | OUTPATIENT
Start: 2021-01-01 | End: 2021-01-01 | Stop reason: HOSPADM

## 2021-01-01 RX ORDER — CARVEDILOL 6.25 MG/1
6.25 TABLET ORAL NIGHTLY
Status: DISCONTINUED | OUTPATIENT
Start: 2021-01-01 | End: 2021-01-01 | Stop reason: HOSPADM

## 2021-01-01 RX ORDER — NITROFURANTOIN 25; 75 MG/1; MG/1
100 CAPSULE ORAL 2 TIMES DAILY
Qty: 10 CAPSULE | Refills: 0 | DISCHARGE
Start: 2021-01-01 | End: 2021-01-01

## 2021-01-01 RX ORDER — LISINOPRIL 20 MG/1
20 TABLET ORAL DAILY
Qty: 30 TABLET | Refills: 3 | Status: SHIPPED | OUTPATIENT
Start: 2021-01-01

## 2021-01-01 RX ORDER — PANTOPRAZOLE SODIUM 40 MG/1
40 TABLET, DELAYED RELEASE ORAL
Status: DISCONTINUED | OUTPATIENT
Start: 2021-01-01 | End: 2021-01-01 | Stop reason: HOSPADM

## 2021-01-01 RX ORDER — AMLODIPINE BESYLATE 10 MG/1
10 TABLET ORAL DAILY
Status: DISCONTINUED | OUTPATIENT
Start: 2021-01-01 | End: 2021-01-01

## 2021-01-01 RX ADMIN — MICONAZOLE NITRATE: 20.6 POWDER TOPICAL at 16:23

## 2021-01-01 RX ADMIN — CYCLOBENZAPRINE HYDROCHLORIDE 10 MG: 10 TABLET, FILM COATED ORAL at 01:52

## 2021-01-01 RX ADMIN — CEFTRIAXONE 1000 MG: 1 INJECTION, POWDER, FOR SOLUTION INTRAMUSCULAR; INTRAVENOUS at 01:52

## 2021-01-01 RX ADMIN — HEPARIN SODIUM 5000 UNITS: 10000 INJECTION INTRAVENOUS; SUBCUTANEOUS at 06:19

## 2021-01-01 RX ADMIN — SODIUM CHLORIDE: 9 INJECTION, SOLUTION INTRAVENOUS at 16:01

## 2021-01-01 RX ADMIN — FERROUS SULFATE TAB 325 MG (65 MG ELEMENTAL FE) 325 MG: 325 (65 FE) TAB at 18:22

## 2021-01-01 RX ADMIN — AMLODIPINE BESYLATE 10 MG: 5 TABLET ORAL at 16:58

## 2021-01-01 RX ADMIN — CLOPIDOGREL BISULFATE 75 MG: 75 TABLET ORAL at 10:31

## 2021-01-01 RX ADMIN — HEPARIN SODIUM 5000 UNITS: 10000 INJECTION INTRAVENOUS; SUBCUTANEOUS at 21:59

## 2021-01-01 RX ADMIN — TRAMADOL HYDROCHLORIDE 50 MG: 50 TABLET, FILM COATED ORAL at 09:13

## 2021-01-01 RX ADMIN — FUROSEMIDE 40 MG: 40 TABLET ORAL at 11:09

## 2021-01-01 RX ADMIN — MICONAZOLE NITRATE: 20.6 POWDER TOPICAL at 21:55

## 2021-01-01 RX ADMIN — SODIUM CHLORIDE: 9 INJECTION, SOLUTION INTRAVENOUS at 01:51

## 2021-01-01 RX ADMIN — PANTOPRAZOLE SODIUM 40 MG: 40 TABLET, DELAYED RELEASE ORAL at 06:33

## 2021-01-01 RX ADMIN — LEVOTHYROXINE SODIUM 50 MCG: 0.05 TABLET ORAL at 06:33

## 2021-01-01 RX ADMIN — FERROUS SULFATE TAB 325 MG (65 MG ELEMENTAL FE) 325 MG: 325 (65 FE) TAB at 10:30

## 2021-01-01 RX ADMIN — Medication 1 TABLET: at 09:15

## 2021-01-01 RX ADMIN — PANTOPRAZOLE SODIUM 40 MG: 40 TABLET, DELAYED RELEASE ORAL at 05:58

## 2021-01-01 RX ADMIN — CEFTRIAXONE 1000 MG: 1 INJECTION, POWDER, FOR SOLUTION INTRAMUSCULAR; INTRAVENOUS at 01:31

## 2021-01-01 RX ADMIN — CYCLOBENZAPRINE HYDROCHLORIDE 10 MG: 10 TABLET, FILM COATED ORAL at 15:13

## 2021-01-01 RX ADMIN — ROSUVASTATIN CALCIUM 20 MG: 20 TABLET, FILM COATED ORAL at 09:15

## 2021-01-01 RX ADMIN — POTASSIUM CHLORIDE 40 MEQ: 1500 TABLET, EXTENDED RELEASE ORAL at 11:09

## 2021-01-01 RX ADMIN — TRAMADOL HYDROCHLORIDE 50 MG: 50 TABLET, FILM COATED ORAL at 21:58

## 2021-01-01 RX ADMIN — CARVEDILOL 12.5 MG: 25 TABLET, FILM COATED ORAL at 11:07

## 2021-01-01 RX ADMIN — CARVEDILOL 6.25 MG: 6.25 TABLET, FILM COATED ORAL at 01:52

## 2021-01-01 RX ADMIN — HEPARIN SODIUM 5000 UNITS: 10000 INJECTION INTRAVENOUS; SUBCUTANEOUS at 21:55

## 2021-01-01 RX ADMIN — MICONAZOLE NITRATE: 20.6 POWDER TOPICAL at 10:35

## 2021-01-01 RX ADMIN — CARVEDILOL 12.5 MG: 25 TABLET, FILM COATED ORAL at 09:16

## 2021-01-01 RX ADMIN — LISINOPRIL 5 MG: 5 TABLET ORAL at 11:05

## 2021-01-01 RX ADMIN — TRAMADOL HYDROCHLORIDE 50 MG: 50 TABLET, FILM COATED ORAL at 08:01

## 2021-01-01 RX ADMIN — FERROUS SULFATE TAB 325 MG (65 MG ELEMENTAL FE) 325 MG: 325 (65 FE) TAB at 16:59

## 2021-01-01 RX ADMIN — POTASSIUM CHLORIDE 40 MEQ: 1500 TABLET, EXTENDED RELEASE ORAL at 10:30

## 2021-01-01 RX ADMIN — Medication 1 TABLET: at 10:30

## 2021-01-01 RX ADMIN — PANTOPRAZOLE SODIUM 40 MG: 40 TABLET, DELAYED RELEASE ORAL at 16:59

## 2021-01-01 RX ADMIN — TRAMADOL HYDROCHLORIDE 50 MG: 50 TABLET, FILM COATED ORAL at 15:13

## 2021-01-01 RX ADMIN — ROSUVASTATIN CALCIUM 20 MG: 20 TABLET, FILM COATED ORAL at 10:31

## 2021-01-01 RX ADMIN — CYCLOBENZAPRINE HYDROCHLORIDE 10 MG: 10 TABLET, FILM COATED ORAL at 10:32

## 2021-01-01 RX ADMIN — CLOPIDOGREL BISULFATE 75 MG: 75 TABLET ORAL at 09:15

## 2021-01-01 RX ADMIN — TRAMADOL HYDROCHLORIDE 50 MG: 50 TABLET, FILM COATED ORAL at 01:52

## 2021-01-01 RX ADMIN — Medication 1 TABLET: at 11:12

## 2021-01-01 RX ADMIN — POTASSIUM CHLORIDE 40 MEQ: 1500 TABLET, EXTENDED RELEASE ORAL at 16:59

## 2021-01-01 RX ADMIN — CARVEDILOL 6.25 MG: 6.25 TABLET, FILM COATED ORAL at 21:58

## 2021-01-01 RX ADMIN — LISINOPRIL 20 MG: 20 TABLET ORAL at 10:30

## 2021-01-01 RX ADMIN — HEPARIN SODIUM 5000 UNITS: 10000 INJECTION INTRAVENOUS; SUBCUTANEOUS at 06:33

## 2021-01-01 RX ADMIN — CARVEDILOL 12.5 MG: 25 TABLET, FILM COATED ORAL at 10:30

## 2021-01-01 RX ADMIN — SODIUM CHLORIDE, PRESERVATIVE FREE 10 ML: 5 INJECTION INTRAVENOUS at 21:55

## 2021-01-01 RX ADMIN — SODIUM CHLORIDE: 9 INJECTION, SOLUTION INTRAVENOUS at 08:05

## 2021-01-01 RX ADMIN — PANTOPRAZOLE SODIUM 40 MG: 40 TABLET, DELAYED RELEASE ORAL at 15:59

## 2021-01-01 RX ADMIN — FUROSEMIDE 40 MG: 40 TABLET ORAL at 10:30

## 2021-01-01 RX ADMIN — FERROUS SULFATE TAB 325 MG (65 MG ELEMENTAL FE) 325 MG: 325 (65 FE) TAB at 11:08

## 2021-01-01 RX ADMIN — HEPARIN SODIUM 5000 UNITS: 10000 INJECTION INTRAVENOUS; SUBCUTANEOUS at 05:58

## 2021-01-01 RX ADMIN — OXYCODONE AND ACETAMINOPHEN 1 TABLET: 5; 325 TABLET ORAL at 22:40

## 2021-01-01 RX ADMIN — ASPIRIN 81 MG: 81 TABLET, COATED ORAL at 09:15

## 2021-01-01 RX ADMIN — FUROSEMIDE 40 MG: 40 TABLET ORAL at 09:16

## 2021-01-01 RX ADMIN — TRAMADOL HYDROCHLORIDE 50 MG: 50 TABLET, FILM COATED ORAL at 21:56

## 2021-01-01 RX ADMIN — MICONAZOLE NITRATE: 20.6 POWDER TOPICAL at 09:13

## 2021-01-01 RX ADMIN — CARVEDILOL 6.25 MG: 6.25 TABLET, FILM COATED ORAL at 21:55

## 2021-01-01 RX ADMIN — CLOPIDOGREL BISULFATE 75 MG: 75 TABLET ORAL at 11:08

## 2021-01-01 RX ADMIN — HEPARIN SODIUM 5000 UNITS: 10000 INJECTION INTRAVENOUS; SUBCUTANEOUS at 15:13

## 2021-01-01 RX ADMIN — HEPARIN SODIUM 5000 UNITS: 10000 INJECTION INTRAVENOUS; SUBCUTANEOUS at 14:28

## 2021-01-01 RX ADMIN — FERROUS SULFATE TAB 325 MG (65 MG ELEMENTAL FE) 325 MG: 325 (65 FE) TAB at 09:15

## 2021-01-01 RX ADMIN — LEVOTHYROXINE SODIUM 50 MCG: 0.05 TABLET ORAL at 05:58

## 2021-01-01 RX ADMIN — ASPIRIN 81 MG: 81 TABLET, COATED ORAL at 11:06

## 2021-01-01 ASSESSMENT — PAIN DESCRIPTION - LOCATION
LOCATION: GROIN;HIP;BACK
LOCATION: GROIN;HIP;LEG
LOCATION: HAND;HIP;BACK
LOCATION: GROIN;BACK
LOCATION: GROIN;BACK
LOCATION: GROIN;HIP
LOCATION: HIP
LOCATION: GROIN;HIP;BACK

## 2021-01-01 ASSESSMENT — PAIN DESCRIPTION - PROGRESSION
CLINICAL_PROGRESSION: NOT CHANGED

## 2021-01-01 ASSESSMENT — PAIN DESCRIPTION - FREQUENCY
FREQUENCY: CONTINUOUS

## 2021-01-01 ASSESSMENT — PAIN SCALES - GENERAL
PAINLEVEL_OUTOF10: 4
PAINLEVEL_OUTOF10: 6
PAINLEVEL_OUTOF10: 4
PAINLEVEL_OUTOF10: 10
PAINLEVEL_OUTOF10: 4
PAINLEVEL_OUTOF10: 8
PAINLEVEL_OUTOF10: 9
PAINLEVEL_OUTOF10: 8
PAINLEVEL_OUTOF10: 5
PAINLEVEL_OUTOF10: 7
PAINLEVEL_OUTOF10: 2
PAINLEVEL_OUTOF10: 6
PAINLEVEL_OUTOF10: 7

## 2021-01-01 ASSESSMENT — PAIN DESCRIPTION - PAIN TYPE
TYPE: ACUTE PAIN
TYPE: ACUTE PAIN
TYPE: ACUTE PAIN;CHRONIC PAIN
TYPE: ACUTE PAIN;CHRONIC PAIN
TYPE: ACUTE PAIN
TYPE: ACUTE PAIN;CHRONIC PAIN
TYPE: ACUTE PAIN
TYPE: CHRONIC PAIN;ACUTE PAIN

## 2021-01-01 ASSESSMENT — PAIN DESCRIPTION - ORIENTATION
ORIENTATION: LEFT
ORIENTATION: ANTERIOR;POSTERIOR;LEFT
ORIENTATION: POSTERIOR;LOWER
ORIENTATION: LEFT
ORIENTATION: ANTERIOR;POSTERIOR
ORIENTATION: POSTERIOR;LOWER;LEFT

## 2021-01-01 ASSESSMENT — PAIN DESCRIPTION - DESCRIPTORS
DESCRIPTORS: SHARP
DESCRIPTORS: DISCOMFORT;DULL;ACHING;SORE
DESCRIPTORS: SHARP;STABBING;SORE
DESCRIPTORS: DISCOMFORT;DULL;CONSTANT
DESCRIPTORS: ACHING;DISCOMFORT;SHARP;STABBING
DESCRIPTORS: SHARP
DESCRIPTORS: DISCOMFORT;SHARP;STABBING

## 2021-01-01 ASSESSMENT — PAIN DESCRIPTION - ONSET
ONSET: ON-GOING

## 2021-01-01 ASSESSMENT — PAIN - FUNCTIONAL ASSESSMENT
PAIN_FUNCTIONAL_ASSESSMENT: PREVENTS OR INTERFERES SOME ACTIVE ACTIVITIES AND ADLS

## 2021-02-05 ENCOUNTER — HOSPITAL ENCOUNTER (INPATIENT)
Age: 81
LOS: 5 days | Discharge: NURSING, REHAB OR LONG-TERM CARE | DRG: 391 | End: 2021-02-10
Attending: EMERGENCY MEDICINE | Admitting: FAMILY MEDICINE
Payer: MEDICARE

## 2021-02-05 ENCOUNTER — APPOINTMENT (OUTPATIENT)
Dept: GENERAL RADIOLOGY | Age: 81
DRG: 391 | End: 2021-02-05
Payer: MEDICARE

## 2021-02-05 ENCOUNTER — APPOINTMENT (OUTPATIENT)
Dept: CT IMAGING | Age: 81
DRG: 391 | End: 2021-02-05
Payer: MEDICARE

## 2021-02-05 DIAGNOSIS — N39.0 URINARY TRACT INFECTION WITHOUT HEMATURIA, SITE UNSPECIFIED: Primary | ICD-10-CM

## 2021-02-05 DIAGNOSIS — K27.9 PEPTIC ULCER DISEASE: ICD-10-CM

## 2021-02-05 PROBLEM — M54.50 LOW BACK PAIN: Status: ACTIVE | Noted: 2021-02-05

## 2021-02-05 PROBLEM — E87.70 VOLUME OVERLOAD: Status: ACTIVE | Noted: 2021-02-05

## 2021-02-05 PROBLEM — E78.5 HLD (HYPERLIPIDEMIA): Status: ACTIVE | Noted: 2021-02-05

## 2021-02-05 PROBLEM — I25.10 CAD (CORONARY ARTERY DISEASE): Status: ACTIVE | Noted: 2021-02-05

## 2021-02-05 PROBLEM — N17.9 AKI (ACUTE KIDNEY INJURY) (HCC): Status: ACTIVE | Noted: 2021-02-05

## 2021-02-05 PROBLEM — K29.90 GASTRODUODENITIS: Status: ACTIVE | Noted: 2021-02-05

## 2021-02-05 PROBLEM — M25.552 HIP PAIN, LEFT: Status: ACTIVE | Noted: 2021-02-05

## 2021-02-05 PROBLEM — R60.9 EDEMA: Status: ACTIVE | Noted: 2021-02-05

## 2021-02-05 PROBLEM — S32.029A CLOSED FRACTURE OF SECOND LUMBAR VERTEBRA (HCC): Status: ACTIVE | Noted: 2021-02-05

## 2021-02-05 PROBLEM — E87.6 HYPOKALEMIA: Status: ACTIVE | Noted: 2021-02-05

## 2021-02-05 PROBLEM — N18.9 ACUTE KIDNEY INJURY SUPERIMPOSED ON CHRONIC KIDNEY DISEASE (HCC): Status: ACTIVE | Noted: 2021-02-05

## 2021-02-05 PROBLEM — N17.9 ACUTE KIDNEY INJURY SUPERIMPOSED ON CHRONIC KIDNEY DISEASE (HCC): Status: ACTIVE | Noted: 2021-02-05

## 2021-02-05 LAB
ABO/RH: NORMAL
ALBUMIN SERPL-MCNC: 2.2 G/DL (ref 3.5–5.2)
ALP BLD-CCNC: 147 U/L (ref 35–104)
ALT SERPL-CCNC: 13 U/L (ref 0–32)
ANION GAP SERPL CALCULATED.3IONS-SCNC: 10 MMOL/L (ref 7–16)
ANISOCYTOSIS: ABNORMAL
ANTIBODY SCREEN: NORMAL
APTT: 22.7 SEC (ref 24.5–35.1)
AST SERPL-CCNC: 18 U/L (ref 0–31)
BACTERIA: ABNORMAL /HPF
BASOPHILS ABSOLUTE: 0.03 E9/L (ref 0–0.2)
BASOPHILS RELATIVE PERCENT: 0.2 % (ref 0–2)
BILIRUB SERPL-MCNC: 0.5 MG/DL (ref 0–1.2)
BILIRUBIN URINE: NEGATIVE
BLOOD, URINE: ABNORMAL
BUN BLDV-MCNC: 38 MG/DL (ref 8–23)
BURR CELLS: ABNORMAL
CALCIUM SERPL-MCNC: 8.3 MG/DL (ref 8.6–10.2)
CHLORIDE BLD-SCNC: 105 MMOL/L (ref 98–107)
CLARITY: CLEAR
CO2: 23 MMOL/L (ref 22–29)
COLOR: YELLOW
CREAT SERPL-MCNC: 1.5 MG/DL (ref 0.5–1)
EOSINOPHILS ABSOLUTE: 0.09 E9/L (ref 0.05–0.5)
EOSINOPHILS RELATIVE PERCENT: 0.6 % (ref 0–6)
GFR AFRICAN AMERICAN: 40
GFR NON-AFRICAN AMERICAN: 33 ML/MIN/1.73
GLUCOSE BLD-MCNC: 112 MG/DL (ref 74–99)
GLUCOSE URINE: NEGATIVE MG/DL
HCT VFR BLD CALC: 31.8 % (ref 34–48)
HEMOGLOBIN: 9.7 G/DL (ref 11.5–15.5)
IMMATURE GRANULOCYTES #: 0.09 E9/L
IMMATURE GRANULOCYTES %: 0.6 % (ref 0–5)
INR BLD: 1.3
KETONES, URINE: NEGATIVE MG/DL
LACTIC ACID, SEPSIS: 1.3 MMOL/L (ref 0.5–1.9)
LEUKOCYTE ESTERASE, URINE: ABNORMAL
LYMPHOCYTES ABSOLUTE: 0.83 E9/L (ref 1.5–4)
LYMPHOCYTES RELATIVE PERCENT: 5.6 % (ref 20–42)
MCH RBC QN AUTO: 26.1 PG (ref 26–35)
MCHC RBC AUTO-ENTMCNC: 30.5 % (ref 32–34.5)
MCV RBC AUTO: 85.5 FL (ref 80–99.9)
METER GLUCOSE: 111 MG/DL (ref 74–99)
MONOCYTES ABSOLUTE: 1.57 E9/L (ref 0.1–0.95)
MONOCYTES RELATIVE PERCENT: 10.6 % (ref 2–12)
NEUTROPHILS ABSOLUTE: 12.14 E9/L (ref 1.8–7.3)
NEUTROPHILS RELATIVE PERCENT: 82.4 % (ref 43–80)
NITRITE, URINE: POSITIVE
OVALOCYTES: ABNORMAL
PDW BLD-RTO: 16.7 FL (ref 11.5–15)
PH UA: 6 (ref 5–9)
PLATELET # BLD: 144 E9/L (ref 130–450)
PMV BLD AUTO: 10.5 FL (ref 7–12)
POIKILOCYTES: ABNORMAL
POLYCHROMASIA: ABNORMAL
POTASSIUM SERPL-SCNC: 3.4 MMOL/L (ref 3.5–5)
PRO-BNP: 3919 PG/ML (ref 0–450)
PROTEIN UA: 30 MG/DL
PROTHROMBIN TIME: 14.6 SEC (ref 9.3–12.4)
RBC # BLD: 3.72 E12/L (ref 3.5–5.5)
RBC UA: ABNORMAL /HPF (ref 0–2)
ROULEAUX: ABNORMAL
SARS-COV-2, NAAT: NOT DETECTED
SODIUM BLD-SCNC: 138 MMOL/L (ref 132–146)
SPECIFIC GRAVITY UA: 1.01 (ref 1–1.03)
TOTAL PROTEIN: 5.2 G/DL (ref 6.4–8.3)
TROPONIN: <0.01 NG/ML (ref 0–0.03)
UROBILINOGEN, URINE: 1 E.U./DL
WBC # BLD: 14.8 E9/L (ref 4.5–11.5)
WBC UA: >20 /HPF (ref 0–5)

## 2021-02-05 PROCEDURE — 2060000000 HC ICU INTERMEDIATE R&B

## 2021-02-05 PROCEDURE — P9612 CATHETERIZE FOR URINE SPEC: HCPCS

## 2021-02-05 PROCEDURE — 2580000003 HC RX 258: Performed by: FAMILY MEDICINE

## 2021-02-05 PROCEDURE — 83690 ASSAY OF LIPASE: CPT

## 2021-02-05 PROCEDURE — 36415 COLL VENOUS BLD VENIPUNCTURE: CPT

## 2021-02-05 PROCEDURE — 84439 ASSAY OF FREE THYROXINE: CPT

## 2021-02-05 PROCEDURE — 81001 URINALYSIS AUTO W/SCOPE: CPT

## 2021-02-05 PROCEDURE — 80053 COMPREHEN METABOLIC PANEL: CPT

## 2021-02-05 PROCEDURE — 71045 X-RAY EXAM CHEST 1 VIEW: CPT

## 2021-02-05 PROCEDURE — 86900 BLOOD TYPING SEROLOGIC ABO: CPT

## 2021-02-05 PROCEDURE — 84443 ASSAY THYROID STIM HORMONE: CPT

## 2021-02-05 PROCEDURE — 93005 ELECTROCARDIOGRAM TRACING: CPT | Performed by: STUDENT IN AN ORGANIZED HEALTH CARE EDUCATION/TRAINING PROGRAM

## 2021-02-05 PROCEDURE — 86901 BLOOD TYPING SEROLOGIC RH(D): CPT

## 2021-02-05 PROCEDURE — 85610 PROTHROMBIN TIME: CPT

## 2021-02-05 PROCEDURE — 83605 ASSAY OF LACTIC ACID: CPT

## 2021-02-05 PROCEDURE — C9113 INJ PANTOPRAZOLE SODIUM, VIA: HCPCS | Performed by: FAMILY MEDICINE

## 2021-02-05 PROCEDURE — 83880 ASSAY OF NATRIURETIC PEPTIDE: CPT

## 2021-02-05 PROCEDURE — 6360000002 HC RX W HCPCS: Performed by: FAMILY MEDICINE

## 2021-02-05 PROCEDURE — 74176 CT ABD & PELVIS W/O CONTRAST: CPT

## 2021-02-05 PROCEDURE — 84145 PROCALCITONIN (PCT): CPT

## 2021-02-05 PROCEDURE — 85730 THROMBOPLASTIN TIME PARTIAL: CPT

## 2021-02-05 PROCEDURE — 99284 EMERGENCY DEPT VISIT MOD MDM: CPT

## 2021-02-05 PROCEDURE — U0002 COVID-19 LAB TEST NON-CDC: HCPCS

## 2021-02-05 PROCEDURE — 85025 COMPLETE CBC W/AUTO DIFF WBC: CPT

## 2021-02-05 PROCEDURE — 73502 X-RAY EXAM HIP UNI 2-3 VIEWS: CPT

## 2021-02-05 PROCEDURE — 84484 ASSAY OF TROPONIN QUANT: CPT

## 2021-02-05 PROCEDURE — 82962 GLUCOSE BLOOD TEST: CPT

## 2021-02-05 PROCEDURE — 6370000000 HC RX 637 (ALT 250 FOR IP): Performed by: FAMILY MEDICINE

## 2021-02-05 PROCEDURE — 86850 RBC ANTIBODY SCREEN: CPT

## 2021-02-05 RX ORDER — PANTOPRAZOLE SODIUM 40 MG/10ML
40 INJECTION, POWDER, LYOPHILIZED, FOR SOLUTION INTRAVENOUS 2 TIMES DAILY
Status: DISCONTINUED | OUTPATIENT
Start: 2021-02-05 | End: 2021-02-09

## 2021-02-05 RX ORDER — CARVEDILOL 6.25 MG/1
12.5 TABLET ORAL EVERY MORNING
Status: DISCONTINUED | OUTPATIENT
Start: 2021-02-06 | End: 2021-02-10 | Stop reason: HOSPADM

## 2021-02-05 RX ORDER — POLYETHYLENE GLYCOL 3350 17 G/17G
17 POWDER, FOR SOLUTION ORAL DAILY PRN
Status: DISCONTINUED | OUTPATIENT
Start: 2021-02-05 | End: 2021-02-10 | Stop reason: HOSPADM

## 2021-02-05 RX ORDER — HYDROCODONE BITARTRATE AND ACETAMINOPHEN 5; 325 MG/1; MG/1
1 TABLET ORAL EVERY 6 HOURS PRN
Status: DISCONTINUED | OUTPATIENT
Start: 2021-02-05 | End: 2021-02-10 | Stop reason: HOSPADM

## 2021-02-05 RX ORDER — MORPHINE SULFATE 2 MG/ML
2 INJECTION, SOLUTION INTRAMUSCULAR; INTRAVENOUS EVERY 4 HOURS PRN
Status: DISCONTINUED | OUTPATIENT
Start: 2021-02-05 | End: 2021-02-10 | Stop reason: HOSPADM

## 2021-02-05 RX ORDER — POTASSIUM CHLORIDE 20 MEQ/1
40 TABLET, EXTENDED RELEASE ORAL ONCE
Status: COMPLETED | OUTPATIENT
Start: 2021-02-06 | End: 2021-02-06

## 2021-02-05 RX ORDER — CARVEDILOL 6.25 MG/1
6.25 TABLET ORAL NIGHTLY
Status: DISCONTINUED | OUTPATIENT
Start: 2021-02-05 | End: 2021-02-10 | Stop reason: HOSPADM

## 2021-02-05 RX ORDER — SODIUM CHLORIDE 0.9 % (FLUSH) 0.9 %
10 SYRINGE (ML) INJECTION EVERY 12 HOURS SCHEDULED
Status: DISCONTINUED | OUTPATIENT
Start: 2021-02-05 | End: 2021-02-10 | Stop reason: HOSPADM

## 2021-02-05 RX ORDER — CHOLECALCIFEROL (VITAMIN D3) 50 MCG
2000 TABLET ORAL DAILY
Status: DISCONTINUED | OUTPATIENT
Start: 2021-02-06 | End: 2021-02-10 | Stop reason: HOSPADM

## 2021-02-05 RX ORDER — M-VIT,TX,IRON,MINS/CALC/FOLIC 27MG-0.4MG
1 TABLET ORAL DAILY
Status: DISCONTINUED | OUTPATIENT
Start: 2021-02-06 | End: 2021-02-10 | Stop reason: HOSPADM

## 2021-02-05 RX ORDER — LIDOCAINE 4 G/G
1 PATCH TOPICAL DAILY
Status: DISCONTINUED | OUTPATIENT
Start: 2021-02-06 | End: 2021-02-10 | Stop reason: HOSPADM

## 2021-02-05 RX ORDER — BUMETANIDE 0.25 MG/ML
1 INJECTION, SOLUTION INTRAMUSCULAR; INTRAVENOUS 2 TIMES DAILY
Status: DISCONTINUED | OUTPATIENT
Start: 2021-02-06 | End: 2021-02-10

## 2021-02-05 RX ORDER — SODIUM CHLORIDE AND POTASSIUM CHLORIDE .9; .15 G/100ML; G/100ML
SOLUTION INTRAVENOUS CONTINUOUS
Status: DISCONTINUED | OUTPATIENT
Start: 2021-02-05 | End: 2021-02-05

## 2021-02-05 RX ORDER — BUMETANIDE 0.25 MG/ML
1 INJECTION, SOLUTION INTRAMUSCULAR; INTRAVENOUS ONCE
Status: DISCONTINUED | OUTPATIENT
Start: 2021-02-06 | End: 2021-02-05

## 2021-02-05 RX ORDER — FERROUS SULFATE 325(65) MG
325 TABLET ORAL 2 TIMES DAILY
Status: DISCONTINUED | OUTPATIENT
Start: 2021-02-05 | End: 2021-02-10 | Stop reason: HOSPADM

## 2021-02-05 RX ORDER — ACETAMINOPHEN 325 MG/1
650 TABLET ORAL EVERY 4 HOURS PRN
Status: DISCONTINUED | OUTPATIENT
Start: 2021-02-05 | End: 2021-02-10 | Stop reason: HOSPADM

## 2021-02-05 RX ORDER — ONDANSETRON 2 MG/ML
4 INJECTION INTRAMUSCULAR; INTRAVENOUS EVERY 6 HOURS PRN
Status: DISCONTINUED | OUTPATIENT
Start: 2021-02-05 | End: 2021-02-10 | Stop reason: HOSPADM

## 2021-02-05 RX ORDER — PROMETHAZINE HYDROCHLORIDE 25 MG/1
12.5 TABLET ORAL EVERY 6 HOURS PRN
Status: DISCONTINUED | OUTPATIENT
Start: 2021-02-05 | End: 2021-02-10 | Stop reason: HOSPADM

## 2021-02-05 RX ORDER — SODIUM CHLORIDE 0.9 % (FLUSH) 0.9 %
10 SYRINGE (ML) INJECTION PRN
Status: DISCONTINUED | OUTPATIENT
Start: 2021-02-05 | End: 2021-02-10 | Stop reason: HOSPADM

## 2021-02-05 RX ORDER — ROSUVASTATIN CALCIUM 20 MG/1
20 TABLET, COATED ORAL DAILY
Status: DISCONTINUED | OUTPATIENT
Start: 2021-02-06 | End: 2021-02-10 | Stop reason: HOSPADM

## 2021-02-05 RX ADMIN — PANTOPRAZOLE SODIUM 40 MG: 40 INJECTION, POWDER, FOR SOLUTION INTRAVENOUS at 21:39

## 2021-02-05 RX ADMIN — HYDROCODONE BITARTRATE AND ACETAMINOPHEN 1 TABLET: 5; 325 TABLET ORAL at 21:39

## 2021-02-05 RX ADMIN — SODIUM CHLORIDE, PRESERVATIVE FREE 10 ML: 5 INJECTION INTRAVENOUS at 21:39

## 2021-02-05 RX ADMIN — ONDANSETRON 4 MG: 2 INJECTION INTRAMUSCULAR; INTRAVENOUS at 21:46

## 2021-02-05 RX ADMIN — FERROUS SULFATE TAB 325 MG (65 MG ELEMENTAL FE) 325 MG: 325 (65 FE) TAB at 21:39

## 2021-02-05 RX ADMIN — CARVEDILOL 6.25 MG: 6.25 TABLET, FILM COATED ORAL at 21:39

## 2021-02-05 ASSESSMENT — PAIN SCALES - GENERAL: PAINLEVEL_OUTOF10: 8

## 2021-02-05 NOTE — Clinical Note
Patient Class: Inpatient [101]   REQUIRED: Diagnosis: MEGHAN (acute kidney injury) ([facility], [place] [address] [[id number]]   Estimated Length of Stay: Estimated stay of more than 2 midnights   Admitting Provider: Luis Antonio Mackey [[id number]]   Telemetry Bed Required?: Yes

## 2021-02-06 ENCOUNTER — APPOINTMENT (OUTPATIENT)
Dept: ULTRASOUND IMAGING | Age: 81
DRG: 391 | End: 2021-02-06
Payer: MEDICARE

## 2021-02-06 LAB
ANION GAP SERPL CALCULATED.3IONS-SCNC: 10 MMOL/L (ref 7–16)
BASOPHILS ABSOLUTE: 0.04 E9/L (ref 0–0.2)
BASOPHILS RELATIVE PERCENT: 0.3 % (ref 0–2)
BUN BLDV-MCNC: 36 MG/DL (ref 8–23)
C-REACTIVE PROTEIN: 13.6 MG/DL (ref 0–0.4)
CALCIUM SERPL-MCNC: 8.3 MG/DL (ref 8.6–10.2)
CHLORIDE BLD-SCNC: 107 MMOL/L (ref 98–107)
CO2: 22 MMOL/L (ref 22–29)
CREAT SERPL-MCNC: 1.5 MG/DL (ref 0.5–1)
EKG ATRIAL RATE: 94 BPM
EKG P AXIS: 57 DEGREES
EKG P-R INTERVAL: 168 MS
EKG Q-T INTERVAL: 356 MS
EKG QRS DURATION: 98 MS
EKG QTC CALCULATION (BAZETT): 445 MS
EKG R AXIS: 25 DEGREES
EKG VENTRICULAR RATE: 94 BPM
EOSINOPHILS ABSOLUTE: 0.12 E9/L (ref 0.05–0.5)
EOSINOPHILS RELATIVE PERCENT: 0.9 % (ref 0–6)
GFR AFRICAN AMERICAN: 40
GFR NON-AFRICAN AMERICAN: 33 ML/MIN/1.73
GLUCOSE BLD-MCNC: 96 MG/DL (ref 74–99)
HCT VFR BLD CALC: 28.1 % (ref 34–48)
HEMOGLOBIN: 9.1 G/DL (ref 11.5–15.5)
IMMATURE GRANULOCYTES #: 0.1 E9/L
IMMATURE GRANULOCYTES %: 0.8 % (ref 0–5)
LACTIC ACID, SEPSIS: 0.8 MMOL/L (ref 0.5–1.9)
LIPASE: 14 U/L (ref 13–60)
LIPASE: 15 U/L (ref 13–60)
LYMPHOCYTES ABSOLUTE: 1.12 E9/L (ref 1.5–4)
LYMPHOCYTES RELATIVE PERCENT: 8.5 % (ref 20–42)
MCH RBC QN AUTO: 27.5 PG (ref 26–35)
MCHC RBC AUTO-ENTMCNC: 32.4 % (ref 32–34.5)
MCV RBC AUTO: 84.9 FL (ref 80–99.9)
MONOCYTES ABSOLUTE: 1.48 E9/L (ref 0.1–0.95)
MONOCYTES RELATIVE PERCENT: 11.2 % (ref 2–12)
NEUTROPHILS ABSOLUTE: 10.39 E9/L (ref 1.8–7.3)
NEUTROPHILS RELATIVE PERCENT: 78.3 % (ref 43–80)
PDW BLD-RTO: 16.7 FL (ref 11.5–15)
PLATELET # BLD: 145 E9/L (ref 130–450)
PMV BLD AUTO: 11.2 FL (ref 7–12)
POTASSIUM REFLEX MAGNESIUM: 3.6 MMOL/L (ref 3.5–5)
PROCALCITONIN: 0.71 NG/ML (ref 0–0.08)
RBC # BLD: 3.31 E12/L (ref 3.5–5.5)
SEDIMENTATION RATE, ERYTHROCYTE: 73 MM/HR (ref 0–20)
SODIUM BLD-SCNC: 139 MMOL/L (ref 132–146)
T4 FREE: 0.89 NG/DL (ref 0.93–1.7)
TOTAL CK: 10 U/L (ref 20–180)
TSH SERPL DL<=0.05 MIU/L-ACNC: 2.62 UIU/ML (ref 0.27–4.2)
WBC # BLD: 13.3 E9/L (ref 4.5–11.5)

## 2021-02-06 PROCEDURE — C9113 INJ PANTOPRAZOLE SODIUM, VIA: HCPCS | Performed by: FAMILY MEDICINE

## 2021-02-06 PROCEDURE — 80048 BASIC METABOLIC PNL TOTAL CA: CPT

## 2021-02-06 PROCEDURE — 6370000000 HC RX 637 (ALT 250 FOR IP): Performed by: INTERNAL MEDICINE

## 2021-02-06 PROCEDURE — 93970 EXTREMITY STUDY: CPT

## 2021-02-06 PROCEDURE — 2580000003 HC RX 258: Performed by: FAMILY MEDICINE

## 2021-02-06 PROCEDURE — 6360000002 HC RX W HCPCS: Performed by: FAMILY MEDICINE

## 2021-02-06 PROCEDURE — 85025 COMPLETE CBC W/AUTO DIFF WBC: CPT

## 2021-02-06 PROCEDURE — 82550 ASSAY OF CK (CPK): CPT

## 2021-02-06 PROCEDURE — 93970 EXTREMITY STUDY: CPT | Performed by: RADIOLOGY

## 2021-02-06 PROCEDURE — 2500000003 HC RX 250 WO HCPCS: Performed by: FAMILY MEDICINE

## 2021-02-06 PROCEDURE — 2060000000 HC ICU INTERMEDIATE R&B

## 2021-02-06 PROCEDURE — 87040 BLOOD CULTURE FOR BACTERIA: CPT

## 2021-02-06 PROCEDURE — 99221 1ST HOSP IP/OBS SF/LOW 40: CPT | Performed by: SURGERY

## 2021-02-06 PROCEDURE — 85651 RBC SED RATE NONAUTOMATED: CPT

## 2021-02-06 PROCEDURE — 6370000000 HC RX 637 (ALT 250 FOR IP): Performed by: FAMILY MEDICINE

## 2021-02-06 PROCEDURE — 83690 ASSAY OF LIPASE: CPT

## 2021-02-06 PROCEDURE — 93010 ELECTROCARDIOGRAM REPORT: CPT | Performed by: INTERNAL MEDICINE

## 2021-02-06 PROCEDURE — 86140 C-REACTIVE PROTEIN: CPT

## 2021-02-06 PROCEDURE — 36415 COLL VENOUS BLD VENIPUNCTURE: CPT

## 2021-02-06 PROCEDURE — 99222 1ST HOSP IP/OBS MODERATE 55: CPT | Performed by: ORTHOPAEDIC SURGERY

## 2021-02-06 RX ORDER — GLUCOSAMINE/D3/BOSWELLIA SERRA 1500MG-400
10000 TABLET ORAL
COMMUNITY

## 2021-02-06 RX ORDER — LEVOTHYROXINE SODIUM 0.05 MG/1
50 TABLET ORAL DAILY
Status: DISCONTINUED | OUTPATIENT
Start: 2021-02-06 | End: 2021-02-10 | Stop reason: HOSPADM

## 2021-02-06 RX ORDER — HEPARIN SODIUM 10000 [USP'U]/ML
5000 INJECTION, SOLUTION INTRAVENOUS; SUBCUTANEOUS EVERY 8 HOURS
Status: DISCONTINUED | OUTPATIENT
Start: 2021-02-06 | End: 2021-02-10 | Stop reason: HOSPADM

## 2021-02-06 RX ADMIN — CEFTRIAXONE SODIUM 1000 MG: 1 INJECTION, POWDER, FOR SOLUTION INTRAMUSCULAR; INTRAVENOUS at 02:43

## 2021-02-06 RX ADMIN — FERROUS SULFATE TAB 325 MG (65 MG ELEMENTAL FE) 325 MG: 325 (65 FE) TAB at 16:09

## 2021-02-06 RX ADMIN — BUMETANIDE 1 MG: 0.25 INJECTION INTRAMUSCULAR; INTRAVENOUS at 23:08

## 2021-02-06 RX ADMIN — SODIUM CHLORIDE, PRESERVATIVE FREE 10 ML: 5 INJECTION INTRAVENOUS at 08:27

## 2021-02-06 RX ADMIN — ROSUVASTATIN 20 MG: 20 TABLET, FILM COATED ORAL at 08:29

## 2021-02-06 RX ADMIN — BUMETANIDE 1 MG: 0.25 INJECTION INTRAMUSCULAR; INTRAVENOUS at 00:52

## 2021-02-06 RX ADMIN — Medication 2000 UNITS: at 08:29

## 2021-02-06 RX ADMIN — HEPARIN SODIUM 5000 UNITS: 10000 INJECTION INTRAVENOUS; SUBCUTANEOUS at 05:35

## 2021-02-06 RX ADMIN — FERROUS SULFATE TAB 325 MG (65 MG ELEMENTAL FE) 325 MG: 325 (65 FE) TAB at 08:28

## 2021-02-06 RX ADMIN — BUMETANIDE 1 MG: 0.25 INJECTION INTRAMUSCULAR; INTRAVENOUS at 08:28

## 2021-02-06 RX ADMIN — HEPARIN SODIUM 5000 UNITS: 10000 INJECTION INTRAVENOUS; SUBCUTANEOUS at 21:20

## 2021-02-06 RX ADMIN — HEPARIN SODIUM 5000 UNITS: 10000 INJECTION INTRAVENOUS; SUBCUTANEOUS at 14:54

## 2021-02-06 RX ADMIN — CARVEDILOL 12.5 MG: 6.25 TABLET, FILM COATED ORAL at 08:29

## 2021-02-06 RX ADMIN — POTASSIUM CHLORIDE 40 MEQ: 1500 TABLET, EXTENDED RELEASE ORAL at 00:52

## 2021-02-06 RX ADMIN — SODIUM CHLORIDE, PRESERVATIVE FREE 10 ML: 5 INJECTION INTRAVENOUS at 23:08

## 2021-02-06 RX ADMIN — PANTOPRAZOLE SODIUM 40 MG: 40 INJECTION, POWDER, FOR SOLUTION INTRAVENOUS at 23:08

## 2021-02-06 RX ADMIN — CARVEDILOL 6.25 MG: 6.25 TABLET, FILM COATED ORAL at 21:20

## 2021-02-06 RX ADMIN — Medication 1 TABLET: at 08:29

## 2021-02-06 RX ADMIN — LEVOTHYROXINE SODIUM 50 MCG: 0.05 TABLET ORAL at 11:37

## 2021-02-06 RX ADMIN — PANTOPRAZOLE SODIUM 40 MG: 40 INJECTION, POWDER, FOR SOLUTION INTRAVENOUS at 08:28

## 2021-02-06 RX ADMIN — HYDROCODONE BITARTRATE AND ACETAMINOPHEN 1 TABLET: 5; 325 TABLET ORAL at 16:09

## 2021-02-06 ASSESSMENT — PAIN - FUNCTIONAL ASSESSMENT
PAIN_FUNCTIONAL_ASSESSMENT: PREVENTS OR INTERFERES SOME ACTIVE ACTIVITIES AND ADLS

## 2021-02-06 ASSESSMENT — PAIN DESCRIPTION - ONSET
ONSET: ON-GOING

## 2021-02-06 ASSESSMENT — PAIN SCALES - GENERAL
PAINLEVEL_OUTOF10: 5
PAINLEVEL_OUTOF10: 6
PAINLEVEL_OUTOF10: 8

## 2021-02-06 ASSESSMENT — PAIN DESCRIPTION - LOCATION
LOCATION: HIP
LOCATION: HIP

## 2021-02-06 ASSESSMENT — PAIN DESCRIPTION - PROGRESSION
CLINICAL_PROGRESSION: GRADUALLY WORSENING
CLINICAL_PROGRESSION: GRADUALLY WORSENING

## 2021-02-06 ASSESSMENT — PAIN DESCRIPTION - PAIN TYPE
TYPE: CHRONIC PAIN

## 2021-02-06 ASSESSMENT — PAIN DESCRIPTION - DESCRIPTORS: DESCRIPTORS: ACHING;DISCOMFORT

## 2021-02-06 NOTE — PLAN OF CARE
Problem: Skin Integrity:  Goal: Will show no infection signs and symptoms  Description: Will show no infection signs and symptoms  2/6/2021 1228 by Sridhar Ding RN  Outcome: Met This Shift  2/6/2021 1227 by Sridhar Ding RN  Outcome: Met This Shift  2/6/2021 0219 by Brody Sarmiento RN  Outcome: Met This Shift  Goal: Absence of new skin breakdown  Description: Absence of new skin breakdown  2/6/2021 1228 by Sridhar Ding RN  Outcome: Met This Shift  2/6/2021 1227 by Sridhar Ding RN  Outcome: Met This Shift  2/6/2021 0219 by Brody Sarmiento RN  Outcome: Met This Shift

## 2021-02-06 NOTE — CONSULTS
Department of Orthopedic Surgery  Resident Consult Note          Reason for Consult: Left hip pain    HISTORY OF PRESENT ILLNESS:       Patient is a 79 y.o. female who presents with left hip pain which began approximately last Sunday, 1/31. Onset of her hip pain was not the result of any inciting trauma or recallable injury. She states since Sunday, she continued to have left hip pain and worsening ambulatory dysfunction which provoked patient to see her doctor. Radiographs were obtained and her physician recommended that she come to the emergency department for evaluation. Patient was seen and admitted for left hip and low back pain. Further imaging was also obtained. Orthopedics was consulted today to evaluate the patient's left hip. Upon discussion with patient, patient has had previous issues with her left hip although not as severe as this current presentation. She states the pain is worse with movement. She denies any new numbness/tingling/paresthesias. She denies any fever or chills denies any other orthopedic complaints at this time. Patient has a medical history significant for acute on chronic low back pain with morphine pump. As part of her hospital work-up, patient was found to have a UTI and GI complaints. She also has bilateral lower extremity edema. Patient states she has poor ambulatory mobility at baseline, utilizing a Rollator.     Past Medical History:        Diagnosis Date    Anemia 11/22/2012    CAD (coronary artery disease)     Chronic back pain     CKD (chronic kidney disease)     Compression fracture of lumbar spine, non-traumatic (HCC)     Compression fracture of thoracic vertebra ([facility] [address]     History of blood transfusion     HTN (hypertension) 11/22/2012    Hyperlipidemia     OA (osteoarthritis) 11/22/2012    Osteoporosis 11/22/2012    S/P AVR     TISSUE    Valvular heart disease 11/22/2012     Past Surgical History:        Procedure Laterality Date  APPENDECTOMY      CARDIAC SURGERY      CHOLECYSTECTOMY      CORONARY ARTERY BYPASS GRAFT      ECHO COMPL W DOP COLOR FLOW  11/23/2012         LYMPHADENECTOMY Right     upper rt leg lymph node removed over 20 years ago    PAIN MANAGEMENT PROCEDURE      MORPHINE PAIN PUMP    SKIN BIOPSY      TONSILLECTOMY       Current Medications:   Current Facility-Administered Medications: heparin (porcine) injection 5,000 Units, 5,000 Units, Subcutaneous, Q8H  perflutren lipid microspheres (DEFINITY) injection 1.65 mg, 1.5 mL, Intravenous, ONCE PRN  levothyroxine (SYNTHROID) tablet 50 mcg, 50 mcg, Oral, Daily  pantoprazole (PROTONIX) injection 40 mg, 40 mg, Intravenous, BID  acetaminophen (TYLENOL) tablet 650 mg, 650 mg, Oral, Q4H PRN  carvedilol (COREG) tablet 12.5 mg, 12.5 mg, Oral, QAM  carvedilol (COREG) tablet 6.25 mg, 6.25 mg, Oral, Nightly  vitamin D (CHOLECALCIFEROL) tablet 2,000 Units, 2,000 Units, Oral, Daily  ferrous sulfate (IRON 325) tablet 325 mg, 325 mg, Oral, BID  lidocaine 4 % external patch 1 patch, 1 patch, Transdermal, Daily  therapeutic multivitamin-minerals 1 tablet, 1 tablet, Oral, Daily  rosuvastatin (CRESTOR) tablet 20 mg, 20 mg, Oral, Daily  sodium chloride flush 0.9 % injection 10 mL, 10 mL, Intravenous, 2 times per day  sodium chloride flush 0.9 % injection 10 mL, 10 mL, Intravenous, PRN  promethazine (PHENERGAN) tablet 12.5 mg, 12.5 mg, Oral, Q6H PRN **OR** ondansetron (ZOFRAN) injection 4 mg, 4 mg, Intravenous, Q6H PRN  polyethylene glycol (GLYCOLAX) packet 17 g, 17 g, Oral, Daily PRN  HYDROcodone-acetaminophen (NORCO) 5-325 MG per tablet 1 tablet, 1 tablet, Oral, Q6H PRN  cefTRIAXone (ROCEPHIN) 1,000 mg in sterile water 10 mL IV syringe, 1,000 mg, Intravenous, Q24H  bumetanide (BUMEX) injection 1 mg, 1 mg, Intravenous, BID  morphine (PF) injection 2 mg, 2 mg, Intravenous, Q4H PRN  Allergies:  Lodine [etodolac] and Penicillins    Social History: TOBACCO:   reports that she has never smoked. She has never used smokeless tobacco.  ETOH:   reports no history of alcohol use. DRUGS:   reports no history of drug use. ACTIVITIES OF DAILY LIVING:    OCCUPATION:    Family History:   History reviewed. No pertinent family history. REVIEW OF SYSTEMS:  CONSTITUTIONAL:  negative for  fevers, chills  EYES:  negative for blurred vision, visual disturbance  HEENT:  negative for  hearing loss, voice change  RESPIRATORY:  negative for  dyspnea, wheezing  CARDIOVASCULAR:  negative for  chest pain, palpitations  GASTROINTESTINAL: Positive for pain  GENITOURINARY: Positive for pain  HEMATOLOGIC/LYMPHATIC:  negative for bleeding and petechiae  MUSCULOSKELETAL:  positive for left hip pain   NEUROLOGICAL:  negative for headaches, dizziness  BEHAVIOR/PSYCH:  negative for increased agitation and anxiety    PHYSICAL EXAM:    VITALS:  BP (!) 183/74   Pulse 88   Temp 98 °F (36.7 °C) (Temporal)   Resp 16   Ht 5' 4\" (1.626 m)   Wt 194 lb 4 oz (88.1 kg)   SpO2 95%   BMI 33.34 kg/m²   CONSTITUTIONAL:  awake, alert, cooperative, in mild discomfort, and appears stated age  MUSCULOSKELETAL:  Left hip:  Unable to assess gait examination. Patient has significant pannus overlying the left groin and proximal thigh. in the supine position, there is mild pain with log roll. Hip range of motion is somewhat limited in abduction and adduction. Unable to flex the patient's hip to 90 degrees with knee flexion due to underlying severe stiffness in the left knee. Straight leg raise did allow for flexion at the left hip which provoked some pain within the groin. Patient has tenderness to palpation primarily over the lateral left proximal thigh over the greater trochanter.     Overall, severely limited exam due to patient's body habitus and poor mobility in the left lower extremity  Compartments soft and compressible  +PF/DF/EHL  +2/4 DP & PT pulses, Brisk Cap refill, Toes warm and perfused Distal sensation grossly intact to Peroneals, Sural, Saphenous, and tibial nrs       DATA:    CBC:   Lab Results   Component Value Date    WBC 13.3 02/06/2021    RBC 3.31 02/06/2021    HGB 9.1 02/06/2021    HCT 28.1 02/06/2021    MCV 84.9 02/06/2021    MCH 27.5 02/06/2021    MCHC 32.4 02/06/2021    RDW 16.7 02/06/2021     02/06/2021    MPV 11.2 02/06/2021     PT/INR:    Lab Results   Component Value Date    PROTIME 14.6 02/05/2021    INR 1.3 02/05/2021       Radiology Review:  X-ray pelvis/left hip: No fractures or dislocations noted. Degenerative changes noted within the femoral acetabular joint. Previous right-sided inferior pubic rami fracture noted. CT pelvis: No fractures or dislocations noted    IMPRESSION:  · Left hip pain    PLAN:  · Patient presents with left hip pain since 1/31. She denies any trauma or inciting injury. Based on patient's history and physical exam, there is lower suspicion for acute septic left hip joint at this time requiring acute surgical intervention.   We will continue to follow the patient for improvement   · In the interim, recommend PT/OT as able  · Pain control per primary  · Ice as needed  · We will check ESR/CRP  · Discuss with Dr. Sammi Lopez

## 2021-02-06 NOTE — PROGRESS NOTES
Hospitalist Progress Note      SYNOPSIS: Patient admitted on 2021 presented to [facility] with past medical history of anemia, hypertension, osteoarthritis, coronary artery disease status post CABG, spinal stenosis, chronic back pain in pain management, she has spinal stimulator in the left of the abdomen, valvular heart disease-aortic stenosis status post bioprosthetic aortic valve replacement, lymphedema, obesity, ambulatory dysfunction, uses assistive device at home and CKD. Patient has been having pain in the left hip and lower back for about 6 days, pain is dull, constant, moderate in intensity, worse with movement and associated with worsening ambulatory dysfunction.        Vital signs notable for blood pressure 119/90, labs showed negative SARS-CoV-2, glucose 111, white count 14.8, hemoglobin 9.7, this was recently 10.5, potassium 3.4, creatinine 1.5, usual baseline 1.0, proBNP 3919, INR 1.3 and negative troponin. Urinalysis with greater than 20 white cells, 5-10 red cells with many bacteria and leukocyte esterase. Chest x-ray shows stable pleural thickening, x-ray of the left hip and pelvis showed healed pelvic rami fracture on the right, CT scan of the abdomen and pelvis shows thickening of the gastric antrum and proximal duodenum with surrounding fluid, ? Infectious/inflammatory process versus peptic ulcer disease, peripancreatic edema, trace pleural effusion and small ascites. SUBJECTIVE:  Stable overnight. No other overnight issues reported. Patient seen and examined  Records reviewed.    Patient still complaining of pain  She has a pain pump placed by Dr. Elvia Javier  For MRI lumbar spine      Temp (24hrs), Av.9 °F (36.6 °C), Min:97.5 °F (36.4 °C), Max:98.2 °F (36.8 °C)    DIET: Diet NPO, After Midnight Exceptions are: Sips of Water with Meds  CODE: Full Code    Intake/Output Summary (Last 24 hours) at 2021 0482  Last data filed at 2021 0511  Gross per 24 hour   Intake  Output 1000 ml   Net -1000 ml       Review of Systems  All bolded are positive; please see HPI  General:  Fever, chills, diaphoresis, fatigue, malaise, night sweats, weight loss  Psychological:  Anxiety, disorientation, hallucinations. ENT:  Epistaxis, headaches, vertigo, visual changes. Cardiovascular:  Chest pain, irregular heartbeats, palpitations, paroxysmal nocturnal dyspnea. Respiratory:  Shortness of breath, coughing, sputum production, hemoptysis, wheezing, orthopnea. Gastrointestinal:  Nausea, vomiting, diarrhea, heartburn, constipation, abdominal pain, hematemesis, hematochezia, melena, acholic stools  Genito-Urinary:  Dysuria, urgency, frequency, hematuria  Musculoskeletal:  Joint pain, joint stiffness, joint swelling, muscle pain  Neurology:  Headache, focal neurological deficits, weakness, numbness, paresthesia  Derm:  Rashes, ulcers, excoriations, bruising  Extremities:  Decreased ROM, peripheral edema, mottling      OBJECTIVE:    BP (!) 183/74   Pulse 88   Temp 98 °F (36.7 °C) (Temporal)   Resp 16   Ht 5' 4\" (1.626 m)   Wt 194 lb 4 oz (88.1 kg)   SpO2 95%   BMI 33.34 kg/m²     General appearance:  awake, alert, and oriented to person, place, time, and purpose; appears stated age and cooperative; no apparent distress no labored breathing  HEENT:  Conjunctivae/corneas clear. Neck: Supple. No jugular venous distention. Respiratory: symmetrical; clear to auscultation bilaterally; no wheezes; no rhonchi; no rales  Cardiovascular: rhythm regular; rate controlled; no murmurs  Abdomen: Soft, nontender, nondistended  Extremities:  peripheral pulses present; no peripheral edema; no ulcers  Musculoskeletal: No clubbing, cyanosis, no bilateral lower extremity edema. Brisk capillary refill. Skin:  No rashes  on visible skin  Neurologic: awake, alert and following commands     ASSESSMENT and PLAN:  · Gastroduodenitis-stool for H. pylori. PPI. General surgery following. · UTI- IV Rocephin. Urine culture. Blood culture. Procalcitonin 0.71. · MEGHAN on CKD- patient has signs of volume overload and needs to be diuresed. Gentle diuresis, monitor I's and O's and daily weights. Hold fluids for now. Nephrology consult if kidney function gets worse. Kidneys look normal on CT scan. · Acute on chronic diastolic CHF- patient with volume overload, gentle diuresis with Bumex. Last echo was in 2014, EF 70 to 75% with grade 1 diastolic dysfunction. Repeat echo. · Acute on chronic back pain-  L2 fracture on CT scan, obtain MRI of the lumbar spine to evaluate. Pain control. · Left hip pain- no acute findings on x-ray. Clinical presentation worrisome for septic hip. Ortho evaluation. · Hypokalemia- replace and monitor  · Bilateral lower extremity edema with pain- Doppler ultrasound to rule out DVT. ? Cellulitis. Rocephin should cover.   Free T4 decreased, start synthroid  · Coronary artery disease-   · Hyperlipidemia- statin  · Debility with ambulatory dysfunction- physical therapy as tolerated when appropriate  · Anemia- monitor hemoglobin  · Hypertension-           DISPOSITION: Continue current plan of care    Medications:  REVIEWED DAILY    Infusion Medications   Scheduled Medications    heparin (porcine)  5,000 Units Subcutaneous Q8H    pantoprazole  40 mg Intravenous BID    carvedilol  12.5 mg Oral QAM    carvedilol  6.25 mg Oral Nightly    vitamin D  2,000 Units Oral Daily    ferrous sulfate  325 mg Oral BID    lidocaine  1 patch Transdermal Daily    therapeutic multivitamin-minerals  1 tablet Oral Daily    rosuvastatin  20 mg Oral Daily    sodium chloride flush  10 mL Intravenous 2 times per day    cefTRIAXone (ROCEPHIN) IV  1,000 mg Intravenous Q24H    bumetanide  1 mg Intravenous BID     PRN Meds: acetaminophen, sodium chloride flush, promethazine **OR** ondansetron, polyethylene glycol, HYDROcodone 5 mg - acetaminophen, morphine    Labs:     Recent Labs 02/05/21 1644 02/06/21 0620   WBC 14.8* 13.3*   HGB 9.7* 9.1*   HCT 31.8* 28.1*    145       Recent Labs     02/05/21 1644 02/06/21 0620    139   K 3.4* 3.6    107   CO2 23 22   BUN 38* 36*   CREATININE 1.5* 1.5*   CALCIUM 8.3* 8.3*       Recent Labs     02/05/21  1644 02/05/21  2315 02/06/21 0620   PROT 5.2*  --   --    ALKPHOS 147*  --   --    ALT 13  --   --    AST 18  --   --    BILITOT 0.5  --   --    LIPASE  --  15 14       Recent Labs     02/05/21 1644   INR 1.3       Recent Labs     02/05/21 1644 02/06/21 0620   CKTOTAL  --  10*   TROPONINI <0.01  --        Chronic labs:    Lab Results   Component Value Date    TSH 2.620 02/05/2021    INR 1.3 02/05/2021       Radiology: REVIEWED DAILY    +++++++++++++++++++++++++++++++++++++++++++++++++  [address], [place]  +++++++++++++++++++++++++++++++++++++++++++++++++  NOTE: This report was transcribed using voice recognition software. Every effort was made to ensure accuracy; however, inadvertent computerized transcription errors may be present.

## 2021-02-06 NOTE — CONSULTS
 CORONARY ARTERY BYPASS GRAFT      ECHO COMPL W DOP COLOR FLOW  11/23/2012         LYMPHADENECTOMY Right     upper rt leg lymph node removed over 20 years ago    PAIN MANAGEMENT PROCEDURE      MORPHINE PAIN PUMP    SKIN BIOPSY      TONSILLECTOMY         Medications Prior to Admission    Prior to Admission medications    Medication Sig Start Date End Date Taking? Authorizing Provider   Biotin 04094 MCG TABS Take 10,000 mcg by mouth   Yes Historical Provider, MD   furosemide (LASIX) 40 MG tablet Take 1 tablet by mouth daily 3/11/20  Yes Qamar Shahid MD   Multiple Vitamins-Minerals (THERAPEUTIC MULTIVITAMIN-MINERALS) tablet Take 1 tablet by mouth daily   Yes Historical Provider, MD   ferrous sulfate 325 (65 Fe) MG tablet Take 325 mg by mouth 2 times daily   Yes Historical Provider, MD   carvedilol (COREG) 6.25 MG tablet Take 6.25 mg by mouth nightly   Yes Historical Provider, MD   aspirin 81 MG tablet Take 81 mg by mouth daily   Yes Historical Provider, MD   carvedilol (COREG) 6.25 MG tablet Take 12.5 mg by mouth every morning    Yes Historical Provider, MD   clopidogrel (PLAVIX) 75 MG tablet Take 75 mg by mouth daily   Yes Historical Provider, MD   rosuvastatin (CRESTOR) 20 MG tablet Take 20 mg by mouth daily Pt. Takes every other day at home. Yes Historical Provider, MD   Calcium Carbonate-Vitamin D (CALCIUM + D) 600-200 MG-UNIT TABS Take by mouth 2 times daily    Yes Historical Provider, MD   Cholecalciferol (VITAMIN D) 2000 UNITS CAPS capsule Take 2,000 Units by mouth daily    Yes Historical Provider, MD   lidocaine 4 % external patch Place 1 patch onto the skin daily 3/11/20   Qamar Shahid MD   mineral oil-hydrophilic petrolatum (HYDROPHOR) ointment Apply topically as needed. 3/11/20   Qamar Shahid MD   nystatin (MYCOSTATIN) 960327 UNIT/GM ointment Apply topically 2 times daily.  3/11/20   Qamar Shahid MD nystatin-triamcinolone (MYCOLOG II) 526455-0.1 UNIT/GM-% cream Apply topically 4 times daily. 3/11/20   Jacobo Husbands, MD   docusate sodium (COLACE, DULCOLAX) 100 MG CAPS Take 100 mg by mouth 3 times daily 3/11/20   Ocie Husbands, MD   magnesium 30 MG tablet Take 250 mg by mouth nightly    Historical Provider, MD   acetaminophen (TYLENOL) 325 MG tablet Take 650 mg by mouth every 4 hours as needed for Pain (pain 1-3)    Historical Provider, MD       Allergies   Allergen Reactions    Lodine [Etodolac] Hives    Penicillins Hives       History reviewed. No pertinent family history. Social History     Tobacco Use    Smoking status: Never Smoker    Smokeless tobacco: Never Used   Substance Use Topics    Alcohol use: No    Drug use: No         Review of Systems:   General ROS: negative  Hematological and Lymphatic ROS: negative  Respiratory ROS: no cough, shortness of breath, or wheezing  Cardiovascular ROS: no chest pain or dyspnea on exertion  Gastrointestinal ROS: no abdominal pain, change in bowel habits, or black or bloody stools  Genito-Urinary ROS: no dysuria, trouble voiding, or hematuria  Musculoskeletal ROS: c/o L hip pain and back pain. Hx chronic pain with morphine pump      PHYSICAL EXAM:    Vitals:    02/06/21 0730   BP: (!) 183/74   Pulse: 88   Resp: 16   Temp: 98 °F (36.7 °C)   SpO2: 95%       GENERAL:   A&Ox3. Mild distress, tired appearing  HEAD:  Normocephalic. Atraumatic. EYES:   No scleral icterus. PERRL. LUNGS:  No increased work of breathing. CARDIOVASCULAR: RR  ABDOMEN:  Soft, non-distended, non-tender. No guarding, rigidity, rebound. No masses  EXTREMITIES:   MAEx4. Atraumatic. No LE edema.   SKIN:  Warm and dry  NEUROLOGIC:  GCS   RECTAL: patient refused    LABS:    CBC  Recent Labs     02/06/21  0620   WBC 13.3*   HGB 9.1*   HCT 28.1*        BMP  Recent Labs     02/06/21  0620      K 3.6      CO2 22   BUN 36*   CREATININE 1.5*   CALCIUM 8.3* Liver Function  Recent Labs     02/05/21  1644 02/05/21  1644 02/06/21  0620   LIPASE  --    < > 14   BILITOT 0.5  --   --    AST 18  --   --    ALT 13  --   --    ALKPHOS 147*  --   --    PROT 5.2*  --   --    LABALBU 2.2*  --   --     < > = values in this interval not displayed. No results for input(s): LACTATE in the last 72 hours.   Recent Labs     02/05/21  1644   INR 1.3       RADIOLOGY    Ct Abdomen Pelvis Wo Contrast Additional Contrast? None    Result Date: 2/5/2021 EXAMINATION: CT OF THE ABDOMEN AND PELVIS WITHOUT CONTRAST 2/5/2021 6:32 pm TECHNIQUE: CT of the abdomen and pelvis was performed without the administration of intravenous contrast. Multiplanar reformatted images are provided for review. Dose modulation, iterative reconstruction, and/or weight based adjustment of the mA/kV was utilized to reduce the radiation dose to as low as reasonably achievable. COMPARISON: For CT of the abdomen and pelvis, 11/21/2012 HISTORY: ORDERING SYSTEM PROVIDED HISTORY: LLQ pain and left hip pain TECHNOLOGIST PROVIDED HISTORY: Additional Contrast?->None Reason for exam:->LLQ pain and left hip pain What reading provider will be dictating this exam?->CRC FINDINGS: Lower Chest: Trace right and small left pleural effusions. The heart is normal in size. Minimal dependent atelectasis in the left lower lobe. Organs: Liver: Unremarkable. Gallbladder: Surgically absent. Pancreas: Prominently atrophied. Otherwise, unremarkable. Edema seen along the pancreatic head is likely related to duodenal inflammation. Spleen:  Unremarkable. Adrenals: Unremarkable. Kidneys: Unremarkable. GI/Bowel: Edema is seen along gastric antrum and the proximal duodenum indicating an infectious/inflammatory process. The remainder of the bowel is without wall thickening or obstruction. Mild distal colonic diverticulosis without diverticulitis. Pelvis: The urinary bladder is unremarkable. Within limits of the CT technique, the uterus is unremarkable for age. No gross adnexal abnormality seen. . Peritoneum/Retroperitoneum: No lymphadenopathy. No free air. Fluid surrounding the gastric antrum and duodenum. Small volume pelvic ascites. Prominent calcified atherosclerosis is seen in the aorta. No aneurysm. Bones/Soft Tissues: Mild chronic compression fracture deformity of the L2 vertebral body. Old, healed right superior and inferior pubic rami fracture. No acute fracture.   Severe disc degenerative changes in the EXAMINATION: ONE XRAY VIEW OF THE PELVIS AND TWO XRAY VIEWS LEFT HIP; ONE XRAY VIEW OF THE CHEST 2/5/2021 4:18 pm; 2/5/2021 4:19 pm COMPARISON: Pelvis 18 February 2020. Chest dated 30 March 2018 HISTORY: ORDERING SYSTEM PROVIDED HISTORY: left hip pain TECHNOLOGIST PROVIDED HISTORY: Reason for exam:->left hip pain What reading provider will be dictating this exam?->CRC; ORDERING SYSTEM PROVIDED HISTORY: Shortness of breath TECHNOLOGIST PROVIDED HISTORY: Reason for exam:->Shortness of breath What reading provider will be dictating this exam?->CRC FINDINGS: Pelvis and left hip: No definite acute fracture or dislocation. There are healed fractures of the pubic rami on the right. Normal soft tissues. The bones are demineralized. Chest: Stable probable pleural thickening on the left. Stable postoperative changes. The heart is borderline enlarged. The pulmonary vascularity is normal.  The lungs are clear. Chest: Stable probable pleural thickening on the left. Borderline cardiomegaly. Postoperative changes. Pelvis and left hip: No acute fracture. Healed pubic rami fractures on the right. Osteopenia. ASSESSMENT/PLAN:  79 y.o. female with possible gastritis on CT imaging; chronic anemia    Overall care per Primary  Chronic anemia, appears baseline. Do not feel that EGD is warranted at this time  Recommend stool for H pylori  PPI for possible gastritis, ok for oral dosing  Ok for diet, defer to Primary    Plan discussed with Dr. Ministerio Stoll.     Cori St DO  Resident, PGY-2  2/6/2021  9:58 AM

## 2021-02-06 NOTE — ED PROVIDER NOTES
HPI:     Federico Mckinney is a 79 y.o. female presenting to the ED for left-sided hip pain, beginning 3 days ago. The complaint has been persistent, moderate in severity, and worsened by nothing. Patient states that for the last 3 or 4 days she is having left-sided hip pain and that it hurts to walk secondary to this. She states that she is also been feeling weak for the last few days otherwise she denies any symptoms. Denies any headache, dizziness, fever, chest pain, cough, nausea, vomiting, abdominal pain, diarrhea constipation, urinary symptoms. Review of Systems:   Please see HPI above. All bolded are positive. All un-bolded are negative.     Constitutional Symptoms: fever, chills, fatigue, generalized weakness, diaphoresis, increase in thirst, loss of appetite  Eyes: vision change   Ears, Nose, Mouth, Throat: hearing loss, nasal congestion, sores in the mouth  Cardiovascular: chest pain, chest heaviness, palpitations  Respiratory: shortness of breath, wheezing, coughing  Gastrointestinal: abdominal pain, nausea, vomiting, diarrhea, constipation, melena, hematochezia, hematemesis  Genitourinary: dysuria, hematuria, increased frequency  Musculoskeletal: lower extremity edema, myalgias, arthralgias, back pain, left hip pain  Integumentary: rashes, itching   Neurological: headache, lightheadedness, dizziness, confusion, syncope, numbness, tingling, focal weakness  Psychiatric: depression, suicidal ideation, anxiety  Endocrine: unintentional weight change  Hematologic/Lymphatic: lymphadenopathy, easy bruising, easy bleeding   Allergic/Immunologic: recurrent infections            --------------------------------------------- PAST HISTORY --------------------------------------------- Past Medical History:  has a past medical history of Anemia, CAD (coronary artery disease), Chronic back pain, CKD (chronic kidney disease), Compression fracture of lumbar spine, non-traumatic (HCC), Compression fracture of thoracic vertebra ([address] History of blood transfusion, HTN (hypertension), Hyperlipidemia, OA (osteoarthritis), Osteoporosis, S/P AVR, and Valvular heart disease. Past Surgical History:  has a past surgical history that includes ECHO Compl W Dop Color Flow (11/23/2012); Coronary artery bypass graft; Pain management procedure; Cholecystectomy; Appendectomy; Tonsillectomy; Cardiac surgery; skin biopsy; and lymphadenectomy (Right). Social History:  reports that she has never smoked. She has never used smokeless tobacco. She reports that she does not drink alcohol or use drugs. Family History: family history is not on file. The patients home medications have been reviewed.     Allergies: Lodine [etodolac] and Penicillins    -------------------------------------------------- RESULTS -------------------------------------------------  All laboratory and radiology results have been personally reviewed by myself   LABS:  Results for orders placed or performed during the hospital encounter of 02/05/21   CBC auto differential   Result Value Ref Range    WBC 14.8 (H) 4.5 - 11.5 E9/L    RBC 3.72 3.50 - 5.50 E12/L    Hemoglobin 9.7 (L) 11.5 - 15.5 g/dL    Hematocrit 31.8 (L) 34.0 - 48.0 %    MCV 85.5 80.0 - 99.9 fL    MCH 26.1 26.0 - 35.0 pg    MCHC 30.5 (L) 32.0 - 34.5 %    RDW 16.7 (H) 11.5 - 15.0 fL    Platelets 108 557 - 121 E9/L    MPV 10.5 7.0 - 12.0 fL    Neutrophils % 82.4 (H) 43.0 - 80.0 %    Immature Granulocytes % 0.6 0.0 - 5.0 %    Lymphocytes % 5.6 (L) 20.0 - 42.0 %    Monocytes % 10.6 2.0 - 12.0 %    Eosinophils % 0.6 0.0 - 6.0 %    Basophils % 0.2 0.0 - 2.0 %    Neutrophils Absolute 12.14 (H) 1.80 - 7.30 E9/L    Immature Granulocytes # 0.09 E9/L Lymphocytes Absolute 0.83 (L) 1.50 - 4.00 E9/L    Monocytes Absolute 1.57 (H) 0.10 - 0.95 E9/L    Eosinophils Absolute 0.09 0.05 - 0.50 E9/L    Basophils Absolute 0.03 0.00 - 0.20 E9/L    Anisocytosis 2+     Polychromasia 1+     Poikilocytes 1+     Upper Tract Cells 1+     Ovalocytes 1+     Rouleaux 1+    Comprehensive Metabolic Panel   Result Value Ref Range    Sodium 138 132 - 146 mmol/L    Potassium 3.4 (L) 3.5 - 5.0 mmol/L    Chloride 105 98 - 107 mmol/L    CO2 23 22 - 29 mmol/L    Anion Gap 10 7 - 16 mmol/L    Glucose 112 (H) 74 - 99 mg/dL    BUN 38 (H) 8 - 23 mg/dL    CREATININE 1.5 (H) 0.5 - 1.0 mg/dL    GFR Non-African American 33 >=60 mL/min/1.73    GFR African American 40     Calcium 8.3 (L) 8.6 - 10.2 mg/dL    Total Protein 5.2 (L) 6.4 - 8.3 g/dL    Albumin 2.2 (L) 3.5 - 5.2 g/dL    Total Bilirubin 0.5 0.0 - 1.2 mg/dL    Alkaline Phosphatase 147 (H) 35 - 104 U/L    ALT 13 0 - 32 U/L    AST 18 0 - 31 U/L   Troponin   Result Value Ref Range    Troponin <0.01 0.00 - 0.03 ng/mL   Lactate, Sepsis   Result Value Ref Range    Lactic Acid, Sepsis 1.3 0.5 - 1.9 mmol/L   Urinalysis with Microscopic   Result Value Ref Range    Color, UA Yellow Straw/Yellow    Clarity, UA Clear Clear    Glucose, Ur Negative Negative mg/dL    Bilirubin Urine Negative Negative    Ketones, Urine Negative Negative mg/dL    Specific Gravity, UA 1.015 1.005 - 1.030    Blood, Urine LARGE (A) Negative    pH, UA 6.0 5.0 - 9.0    Protein, UA 30 (A) Negative mg/dL    Urobilinogen, Urine 1.0 <2.0 E.U./dL    Nitrite, Urine POSITIVE (A) Negative    Leukocyte Esterase, Urine MODERATE (A) Negative    WBC, UA >20 (A) 0 - 5 /HPF    RBC, UA 5-10 (A) 0 - 2 /HPF    Bacteria, UA MANY (A) None Seen /HPF   Protime-INR   Result Value Ref Range    Protime 14.6 (H) 9.3 - 12.4 sec    INR 1.3    APTT   Result Value Ref Range    aPTT 22.7 (L) 24.5 - 35.1 sec   Brain Natriuretic Peptide   Result Value Ref Range    Pro-BNP 3,919 (H) 0 - 450 pg/mL   COVID-19 Result Value Ref Range    SARS-CoV-2, NAAT Not Detected Not Detected   POCT Glucose   Result Value Ref Range    Meter Glucose 111 (H) 74 - 99 mg/dL   TYPE AND SCREEN   Result Value Ref Range    ABO/Rh A POS     Antibody Screen NEG        RADIOLOGY:  Interpreted by Radiologist.  CT ABDOMEN PELVIS WO CONTRAST Additional Contrast? None   Final Result   1. Inflammation surrounding gastric antrum and the proximal duodenum is   likely infectious/inflammatory, possibly related to peptic ulcer disease. No   free air. 2. Trace right and small left pleural effusions. Small volume ascites. XR CHEST PORTABLE   Final Result   Chest: Stable probable pleural thickening on the left. Borderline   cardiomegaly. Postoperative changes. Pelvis and left hip: No acute fracture. Healed pubic rami fractures on the   right. Osteopenia. XR HIP 2-3 VW W PELVIS LEFT   Final Result   Chest: Stable probable pleural thickening on the left. Borderline   cardiomegaly. Postoperative changes. Pelvis and left hip: No acute fracture. Healed pubic rami fractures on the   right. Osteopenia. US DUP LOWER EXTREMITIES BILATERAL VENOUS    (Results Pending)   MRI LUMBAR SPINE WO CONTRAST    (Results Pending)       ------------------------- NURSING NOTES AND VITALS REVIEWED ---------------------------   The nursing notes within the ED encounter and vital signs as below have been reviewed.    BP (!) 128/59   Pulse 93   Temp 97.5 °F (36.4 °C) (Temporal)   Resp 18   Ht 5' 4\" (1.626 m)   Wt 190 lb (86.2 kg)   SpO2 90%   BMI 32.61 kg/m²   Oxygen Saturation Interpretation: Normal      ---------------------------------------------------PHYSICAL EXAM--------------------------------------      Constitutional/General: Alert and oriented x3, ill appearing, non toxic in NAD, pale  Head: Normocephalic and atraumatic  Eyes: PERRL, EOMI, conjunctival pallor noted Mouth: Oropharynx clear, handling secretions, no trismus, mucous membranes are dry  Neck: Supple, full ROM,   Pulmonary: Lungs clear to auscultation bilaterally, no wheezes, rales, or rhonchi. Not in respiratory distress  Cardiovascular:  Regular rate and rhythm, no murmurs, gallops, or rubs. 2+ distal pulses  Abdomen: Tenderness to palpation of the left lower quadrant. Soft to touch, no guarding, rigidity, rebound  Extremities: Moves all extremities x 4. Warm and well perfused  Skin: warm and dry without rash  Neurologic: GCS 15, no focal deficit noted  Psych: Normal Affect          EKG: This EKG is signed and interpreted by me.     Rate: 94  Rhythm: Sinus  Interpretation: Sinus rhythm with premature atrial complexes, no acute changes noted, no axis deviation,  ms  Comparison: changes compared to previous EKG  ------------------------------ ED COURSE/MEDICAL DECISION MAKING----------------------  Medications   pantoprazole (PROTONIX) injection 40 mg (40 mg Intravenous Given 2/5/21 2139)   acetaminophen (TYLENOL) tablet 650 mg (has no administration in time range)   carvedilol (COREG) tablet 12.5 mg (has no administration in time range)   carvedilol (COREG) tablet 6.25 mg (6.25 mg Oral Given 2/5/21 2139)   vitamin D (CHOLECALCIFEROL) tablet 2,000 Units (has no administration in time range)   ferrous sulfate (IRON 325) tablet 325 mg (325 mg Oral Given 2/5/21 2139)   lidocaine 4 % external patch 1 patch (has no administration in time range)   therapeutic multivitamin-minerals 1 tablet (has no administration in time range)   rosuvastatin (CRESTOR) tablet 20 mg (has no administration in time range)   sodium chloride flush 0.9 % injection 10 mL (10 mLs Intravenous Given 2/5/21 2139)   sodium chloride flush 0.9 % injection 10 mL (has no administration in time range)   promethazine (PHENERGAN) tablet 12.5 mg ( Oral See Alternative 2/5/21 2146)     Or ondansetron [facility]) injection 4 mg (4 mg Intravenous Given 2/5/21 2146)   polyethylene glycol (GLYCOLAX) packet 17 g (has no administration in time range)   HYDROcodone-acetaminophen (NORCO) 5-325 MG per tablet 1 tablet (1 tablet Oral Given 2/5/21 2139)   cefTRIAXone (ROCEPHIN) 1,000 mg in sterile water 10 mL IV syringe (has no administration in time range)   potassium chloride (KLOR-CON M) extended release tablet 40 mEq (has no administration in time range)   bumetanide (BUMEX) injection 1 mg (has no administration in time range)   morphine (PF) injection 2 mg (has no administration in time range)         ED COURSE:       Medical Decision Making:    Patient presented to the ER today with chief complaint of left hip pain, however upon examination it is more of the left lower quadrant abdominal pain. Patient is ill-appearing, and appears pale and dry. Patient does have a leukocytosis of 14.8 and her urinalysis is positive for urinary tract infection. Patient given Rocephin. CT of the abdomen was ordered as she is having upper quadrant pain which demonstrates inflammatory changes in the area of the stomach concerning for ulcer. Patient was given Protonix. Given these findings, I feel patient would benefit from admission for further management. I did speak to Dr. Kristi Meeks who is agreeable to admission at this time. Questions been answered. Counseling: The emergency provider has spoken with the patient and discussed todays results, in addition to providing specific details for the plan of care and counseling regarding the diagnosis and prognosis. Questions are answered at this time and they are agreeable with the plan.      --------------------------------- IMPRESSION AND DISPOSITION ---------------------------------    IMPRESSION  1. Urinary tract infection without hematuria, site unspecified    2.  Peptic ulcer disease        Current Discharge Medication List          DISPOSITION Disposition: Admit to telemetry  Patient condition is fair      NOTE: This report was transcribed using voice recognition software.  Every effort was made to ensure accuracy; however, inadvertent computerized transcription errors may be present         Janee Liu DO  Resident  02/06/21 0225

## 2021-02-06 NOTE — H&P
Hospital Medicine History & Physical      PCP: Mary Bedoya DO    Date of Admission: 2/5/2021    Date of Service: Pt seen/examined on 2/5/2021 and Admitted to Inpatient with expected LOS greater than two midnights due to medical therapy. Chief Complaint: Left hip pain and back pain      History Of Present Illness:      79 y.o. female who presented to [facility] with past medical history of anemia, hypertension, osteoarthritis, coronary artery disease status post CABG, spinal stenosis, chronic back pain in pain management, she has spinal stimulator in the left of the abdomen, valvular heart disease-aortic stenosis status post bioprosthetic aortic valve replacement, lymphedema, obesity, ambulatory dysfunction, uses assistive device at home and CKD. Patient has been having pain in the left hip and lower back for about 6 days, pain is dull, constant, moderate in intensity, worse with movement and associated with worsening ambulatory dysfunction. She denies any chest pain, shortness of breath, fever or cough. No abdominal pain, nausea, vomiting or change in bowel habits. No urinary complaints. She has leg edema bilaterally. Vital signs notable for blood pressure 119/90, labs showed negative SARS-CoV-2, glucose 111, white count 14.8, hemoglobin 9.7, this was recently 10.5, potassium 3.4, creatinine 1.5, usual baseline 1.0, proBNP 3919, INR 1.3 and negative troponin. Urinalysis with greater than 20 white cells, 5-10 red cells with many bacteria and leukocyte esterase. Chest x-ray shows stable pleural thickening, x-ray of the left hip and pelvis showed healed pelvic rami fracture on the right, CT scan of the abdomen and pelvis shows thickening of the gastric antrum and proximal duodenum with surrounding fluid, ? Infectious/inflammatory process versus peptic ulcer disease, peripancreatic edema, trace pleural effusion and small ascites. She is being admitted for further management. Neck: Supple, with limited range of motion. No jugular venous distention. Trachea midline. Respiratory:  Normal respiratory effort. Diminished air movement otherwise clear to auscultation, bilaterally without Rales/Wheezes/Rhonchi. Cardiovascular:  irregular rate and rhythm with normal S1/S2 without murmurs, rubs or gallops. Abdomen: Soft, non-tender, non-distended with normal bowel sounds. Pain pump visible to the left of the abdomen. Musculoskeletal:  No clubbing/cyanosis, 3+ edema bilaterally all the way up to the thigh and dependent flanks. Limited range of motion with tenderness of the extremities  Skin: Skin color, texture, turgor normal.  Mild redness involving the lower extremities  Neurologic:  Cranial nerves: II-XII intact, Global weakness. Left more than right leg weakness  Psychiatric:  Alert and oriented, thought content appropriate, normal insight  Capillary Refill: Brisk,< 3 seconds   Peripheral Pulses: +palpable, equal bilaterally       Labs:     Recent Labs     02/05/21  1644   WBC 14.8*   HGB 9.7*   HCT 31.8*        Recent Labs     02/05/21  1644      K 3.4*      CO2 23   BUN 38*   CREATININE 1.5*   CALCIUM 8.3*     Recent Labs     02/05/21  1644   AST 18   ALT 13   BILITOT 0.5   ALKPHOS 147*     Recent Labs     02/05/21  1644   INR 1.3     Recent Labs     02/05/21  1644   TROPONINI <0.01       Urinalysis:      Lab Results   Component Value Date    NITRU POSITIVE 02/05/2021    WBCUA >20 02/05/2021    BACTERIA MANY 02/05/2021    RBCUA 5-10 02/05/2021    RBCUA NONE 11/21/2012    BLOODU LARGE 02/05/2021    SPECGRAV 1.015 02/05/2021    GLUCOSEU Negative 02/05/2021       Radiology:   Reviewed and documented    CT ABDOMEN PELVIS WO CONTRAST Additional Contrast? None   Final Result   1. Inflammation surrounding gastric antrum and the proximal duodenum is   likely infectious/inflammatory, possibly related to peptic ulcer disease. No   free air. 3.  MEGHAN on CKD, patient has signs of volume overload and needs to be diuresed. Gentle diuresis, monitor I's and O's and daily weights. Hold fluids for now. Nephrology consult if kidney function gets worse. Kidneys look normal on CT scan. 4.  Acute on chronic diastolic CHF, patient with volume overload, gentle diuresis with Bumex. Last echo was in 2014, EF 70 to 75% with grade 1 diastolic dysfunction. Repeat echo. 5.  Acute on chronic back pain. L2 fracture on CT scan, obtain MRI of the lumbar spine to evaluate. Pain control. 6.  Left hip pain, no acute findings on x-ray. Clinical presentation worrisome for septic hip. Consider orthopedic evaluation. 7.  Hypokalemia, replace and monitor  8. Bilateral lower extremity edema with pain, Doppler ultrasound to rule out DVT. ? Cellulitis. Rocephin should cover. Check thyroid function. 9.  Coronary artery disease  10. Hyperlipidemia, statin  11. Debility with ambulatory dysfunction, physical therapy as tolerated when appropriate  12. Anemia, monitor hemoglobin  13. Hypertension, blood pressures running low, holding parameters on medications      DVT Prophylaxis: Heparin  Diet: Diet NPO, After Midnight Exceptions are: Sips of Water with Meds  Code Status: Full Code    PT/OT Eval Status: As needed    Dispo -inpatient/telemetry       Dana Rogers MD    Thank you Cindy Fishman DO for the opportunity to be involved in this patient's care.

## 2021-02-06 NOTE — PROGRESS NOTES
Patient refuses to have MRI and grand daughter is at bedside.  Patient also has a morphine pump in abdomen and is not sure if it is metal.

## 2021-02-06 NOTE — PLAN OF CARE
Problem: Skin Integrity:  Goal: Will show no infection signs and symptoms  Description: Will show no infection signs and symptoms  2/6/2021 1227 by Corrina Massey RN  Outcome: Met This Shift  2/6/2021 0219 by Geovanna Negron RN  Outcome: Met This Shift     Problem: Falls - Risk of:  Goal: Will remain free from falls  Description: Will remain free from falls  2/6/2021 1227 by Corrina Massey RN  Outcome: Met This Shift     Problem: Pain:  Goal: Pain level will decrease  Description: Pain level will decrease  2/6/2021 0219 by Geovanna Negron RN  Outcome: Met This Shift

## 2021-02-07 ENCOUNTER — APPOINTMENT (OUTPATIENT)
Dept: ULTRASOUND IMAGING | Age: 81
DRG: 391 | End: 2021-02-07
Payer: MEDICARE

## 2021-02-07 LAB
ALBUMIN SERPL-MCNC: 2 G/DL (ref 3.5–5.2)
ALP BLD-CCNC: 112 U/L (ref 35–104)
ALT SERPL-CCNC: 9 U/L (ref 0–32)
ANION GAP SERPL CALCULATED.3IONS-SCNC: 11 MMOL/L (ref 7–16)
AST SERPL-CCNC: 14 U/L (ref 0–31)
BILIRUB SERPL-MCNC: <0.2 MG/DL (ref 0–1.2)
BUN BLDV-MCNC: 30 MG/DL (ref 8–23)
CALCIUM SERPL-MCNC: 8.2 MG/DL (ref 8.6–10.2)
CHLORIDE BLD-SCNC: 103 MMOL/L (ref 98–107)
CO2: 23 MMOL/L (ref 22–29)
CREAT SERPL-MCNC: 1.4 MG/DL (ref 0.5–1)
GFR AFRICAN AMERICAN: 44
GFR NON-AFRICAN AMERICAN: 36 ML/MIN/1.73
GLUCOSE BLD-MCNC: 89 MG/DL (ref 74–99)
HCT VFR BLD CALC: 28.1 % (ref 34–48)
HEMOGLOBIN: 9 G/DL (ref 11.5–15.5)
MCH RBC QN AUTO: 26.9 PG (ref 26–35)
MCHC RBC AUTO-ENTMCNC: 32 % (ref 32–34.5)
MCV RBC AUTO: 84.1 FL (ref 80–99.9)
PDW BLD-RTO: 16.6 FL (ref 11.5–15)
PLATELET # BLD: 166 E9/L (ref 130–450)
PMV BLD AUTO: 11.1 FL (ref 7–12)
POTASSIUM SERPL-SCNC: 3.4 MMOL/L (ref 3.5–5)
RBC # BLD: 3.34 E12/L (ref 3.5–5.5)
SODIUM BLD-SCNC: 137 MMOL/L (ref 132–146)
TOTAL PROTEIN: 4.3 G/DL (ref 6.4–8.3)
WBC # BLD: 12.4 E9/L (ref 4.5–11.5)

## 2021-02-07 PROCEDURE — 85027 COMPLETE CBC AUTOMATED: CPT

## 2021-02-07 PROCEDURE — 2060000000 HC ICU INTERMEDIATE R&B

## 2021-02-07 PROCEDURE — 2500000003 HC RX 250 WO HCPCS: Performed by: FAMILY MEDICINE

## 2021-02-07 PROCEDURE — 6370000000 HC RX 637 (ALT 250 FOR IP): Performed by: FAMILY MEDICINE

## 2021-02-07 PROCEDURE — 99232 SBSQ HOSP IP/OBS MODERATE 35: CPT | Performed by: SURGERY

## 2021-02-07 PROCEDURE — 6360000002 HC RX W HCPCS: Performed by: FAMILY MEDICINE

## 2021-02-07 PROCEDURE — 6370000000 HC RX 637 (ALT 250 FOR IP): Performed by: INTERNAL MEDICINE

## 2021-02-07 PROCEDURE — C9113 INJ PANTOPRAZOLE SODIUM, VIA: HCPCS | Performed by: FAMILY MEDICINE

## 2021-02-07 PROCEDURE — 93971 EXTREMITY STUDY: CPT

## 2021-02-07 PROCEDURE — 36415 COLL VENOUS BLD VENIPUNCTURE: CPT

## 2021-02-07 PROCEDURE — 2580000003 HC RX 258: Performed by: FAMILY MEDICINE

## 2021-02-07 PROCEDURE — 80053 COMPREHEN METABOLIC PANEL: CPT

## 2021-02-07 PROCEDURE — 93971 EXTREMITY STUDY: CPT | Performed by: RADIOLOGY

## 2021-02-07 RX ORDER — POTASSIUM CHLORIDE 7.45 MG/ML
10 INJECTION INTRAVENOUS
Status: DISCONTINUED | OUTPATIENT
Start: 2021-02-07 | End: 2021-02-07

## 2021-02-07 RX ORDER — POTASSIUM CHLORIDE 20 MEQ/1
20 TABLET, EXTENDED RELEASE ORAL ONCE
Status: COMPLETED | OUTPATIENT
Start: 2021-02-07 | End: 2021-02-07

## 2021-02-07 RX ADMIN — FERROUS SULFATE TAB 325 MG (65 MG ELEMENTAL FE) 325 MG: 325 (65 FE) TAB at 09:36

## 2021-02-07 RX ADMIN — ROSUVASTATIN 20 MG: 20 TABLET, FILM COATED ORAL at 09:35

## 2021-02-07 RX ADMIN — CARVEDILOL 6.25 MG: 6.25 TABLET, FILM COATED ORAL at 20:36

## 2021-02-07 RX ADMIN — BUMETANIDE 1 MG: 0.25 INJECTION INTRAMUSCULAR; INTRAVENOUS at 09:34

## 2021-02-07 RX ADMIN — HEPARIN SODIUM 5000 UNITS: 10000 INJECTION INTRAVENOUS; SUBCUTANEOUS at 12:44

## 2021-02-07 RX ADMIN — PANTOPRAZOLE SODIUM 40 MG: 40 INJECTION, POWDER, FOR SOLUTION INTRAVENOUS at 20:47

## 2021-02-07 RX ADMIN — HEPARIN SODIUM 5000 UNITS: 10000 INJECTION INTRAVENOUS; SUBCUTANEOUS at 20:36

## 2021-02-07 RX ADMIN — Medication 2000 UNITS: at 09:35

## 2021-02-07 RX ADMIN — Medication 1 TABLET: at 09:35

## 2021-02-07 RX ADMIN — SODIUM CHLORIDE, PRESERVATIVE FREE 10 ML: 5 INJECTION INTRAVENOUS at 09:35

## 2021-02-07 RX ADMIN — PANTOPRAZOLE SODIUM 40 MG: 40 INJECTION, POWDER, FOR SOLUTION INTRAVENOUS at 09:34

## 2021-02-07 RX ADMIN — HYDROCODONE BITARTRATE AND ACETAMINOPHEN 1 TABLET: 5; 325 TABLET ORAL at 09:39

## 2021-02-07 RX ADMIN — CARVEDILOL 12.5 MG: 6.25 TABLET, FILM COATED ORAL at 09:35

## 2021-02-07 RX ADMIN — HEPARIN SODIUM 5000 UNITS: 10000 INJECTION INTRAVENOUS; SUBCUTANEOUS at 05:23

## 2021-02-07 RX ADMIN — BUMETANIDE 1 MG: 0.25 INJECTION INTRAMUSCULAR; INTRAVENOUS at 20:47

## 2021-02-07 RX ADMIN — CEFTRIAXONE SODIUM 1000 MG: 1 INJECTION, POWDER, FOR SOLUTION INTRAMUSCULAR; INTRAVENOUS at 03:27

## 2021-02-07 RX ADMIN — POTASSIUM CHLORIDE 20 MEQ: 1500 TABLET, EXTENDED RELEASE ORAL at 12:42

## 2021-02-07 RX ADMIN — SODIUM CHLORIDE, PRESERVATIVE FREE 10 ML: 5 INJECTION INTRAVENOUS at 20:36

## 2021-02-07 RX ADMIN — LEVOTHYROXINE SODIUM 50 MCG: 0.05 TABLET ORAL at 06:36

## 2021-02-07 RX ADMIN — FERROUS SULFATE TAB 325 MG (65 MG ELEMENTAL FE) 325 MG: 325 (65 FE) TAB at 18:02

## 2021-02-07 ASSESSMENT — PAIN DESCRIPTION - LOCATION: LOCATION: HIP

## 2021-02-07 ASSESSMENT — PAIN DESCRIPTION - PAIN TYPE
TYPE: CHRONIC PAIN

## 2021-02-07 ASSESSMENT — PAIN DESCRIPTION - PROGRESSION
CLINICAL_PROGRESSION: NOT CHANGED
CLINICAL_PROGRESSION: NOT CHANGED

## 2021-02-07 ASSESSMENT — PAIN SCALES - GENERAL
PAINLEVEL_OUTOF10: 8
PAINLEVEL_OUTOF10: 8
PAINLEVEL_OUTOF10: 6

## 2021-02-07 ASSESSMENT — PAIN DESCRIPTION - FREQUENCY: FREQUENCY: CONTINUOUS

## 2021-02-07 ASSESSMENT — PAIN DESCRIPTION - ONSET
ONSET: ON-GOING
ONSET: ON-GOING

## 2021-02-07 ASSESSMENT — PAIN DESCRIPTION - DESCRIPTORS
DESCRIPTORS: ACHING;DISCOMFORT
DESCRIPTORS: ACHING;DISCOMFORT

## 2021-02-07 NOTE — PROGRESS NOTES
Department of Orthopedic Surgery  Resident Progress Note    Patient seen and examined, resting in bed this morning. Patient states she continues to have pain in the left hip this morning, although the pain over her lateral left thigh does feel improved. No new complaints. Still subjectively admits to difficulty moving the leg without pain    VITALS:  BP (!) 146/64   Pulse 84   Temp 97.2 °F (36.2 °C) (Temporal)   Resp 20   Ht 5' 4\" (1.626 m)   Wt 188 lb 1.6 oz (85.3 kg)   SpO2 91%   BMI 32.29 kg/m²     General: alert and oriented to person, place and time, well-developed and well-nourished, in no acute distress    MUSCULOSKELETAL:   left lower extremity:  · Exam of the left lower extremity continues to be limited due to patient's body habitus and poor overall mobility. She remains stiff at the left knee which prohibits a thorough hip exam  · Mild discomfort with gentle logroll of the left leg  · Minimal discomfort with palpation over the proximal lateral thigh  · +PF/DF/EHL  · +2/4 DP & PT pulses, Brisk Cap refill, Toes warm and perfused  · Distal sensation grossly intact to Peroneals, Sural, Saphenous, and tibial nrs    CBC:   Lab Results   Component Value Date    WBC 13.3 02/06/2021    HGB 9.1 02/06/2021    HCT 28.1 02/06/2021     02/06/2021     PT/INR:    Lab Results   Component Value Date    PROTIME 14.6 02/05/2021    INR 1.3 02/05/2021       ASSESSMENT  · Left hip pain-possible trochanteric bursitis and arthritic left hip joint pain    PLAN      · Patient continues to have left hip pain on exam this morning.  Given patient's intractable pain with elevated ESR/CRP and difficulty with left hip motion, will request IR aspiration of left hip to further assess  · Sed rate: 73, CRP: 13.6  · Continue physical therapy and protocol: WBAT - LLE  · Antibiotics per infectious disease recommendations  · Deep venous thrombosis prophylaxis -per primary  · PT/OT as able  · Pain Control per primary · We will continue to monitor for clinical improvement  · Discussed with Dr. Bharat Pittman

## 2021-02-07 NOTE — PLAN OF CARE
Problem: Skin Integrity:  Goal: Will show no infection signs and symptoms  Description: Will show no infection signs and symptoms  2/7/2021 0103 by Mj Johns RN  Outcome: Met This Shift  2/6/2021 1228 by Ron Storey RN  Outcome: Met This Shift  2/6/2021 1227 by Ron Storey RN  Outcome: Met This Shift  Goal: Absence of new skin breakdown  Description: Absence of new skin breakdown  2/7/2021 0103 by Mj Johns RN  Outcome: Met This Shift  2/6/2021 1228 by Ron Storey RN  Outcome: Met This Shift  2/6/2021 1227 by Ron Storey RN  Outcome: Met This Shift

## 2021-02-07 NOTE — PROGRESS NOTES
Comprehensive Nutrition Assessment    Type and Reason for Visit:  Initial, Positive Nutrition Screen    Nutrition Recommendations/Plan:  Continue Current Diet, Start Oral Nutrition Supplement  ADAT. To order Low Calorie High Protein ONS TID. Nutrition Assessment:  Pt admit 2/2 MEGHAN w/ noted UTI. Gastroduodenitis, pending possible EGD. PO diet advanced however w/ poor intake. Malnutrition Assessment:  Malnutrition Status:  No malnutrition    Context:  Chronic Illness     Findings of the 6 clinical characteristics of malnutrition:  Energy Intake:  Mild decrease in energy intake (Comment)  Weight Loss:  No significant weight loss     Body Fat Loss:  No significant body fat loss     Muscle Mass Loss:  No significant muscle mass loss    Fluid Accumulation:  No significant fluid accumulation     Strength:  Not Performed    Estimated Daily Nutrient Needs:  Energy (kcal):  MSJ 1303 x1.2= 1563; ; Weight Used for Energy Requirements:  Current     Protein (g):  70-80(1.3-1.5gm/kg IBW); Weight Used for Protein Requirements:  Ideal          Nutrition Related Findings:  abd exam WDL, +2 edema, - I/Os      Wounds:  None       Current Nutrition Therapies:    DIET FULL LIQUID;     Anthropometric Measures:  · Height: 5' 4\" (162.6 cm)  · Current Body Weight: 188 lb (85.3 kg)(2/7 actual)   · Admission Body Weight: 190 lb (86.2 kg)(2/5 stated)    · Usual Body Weight: 188 lb (85.3 kg)(02/20 per EMR, actual)     · Ideal Body Weight: 120 lbs; % Ideal Body Weight 156.7 %   · BMI: 32.3  · BMI Categories: Obese Class 1 (BMI 30.0-34.9)(+NS wt loss, no noted wt loss x1 year)       Nutrition Diagnosis:   · Inadequate oral intake related to pain as evidenced by intake 0-25%    Nutrition Interventions:   Nutrition Education/Counseling:  Education not indicated   Coordination of Nutrition Care:  Continue to monitor while inpatient, Coordination of Community Care    Goals:  ADAT; Pt to consume >25% of meals and ONS Nutrition Monitoring and Evaluation:   Food/Nutrient Intake Outcomes:  Diet Advancement/Tolerance, Food and Nutrient Intake, Supplement Intake  Physical Signs/Symptoms Outcomes:  Biochemical Data, Nutrition Focused Physical Findings, Skin, GI Status, Fluid Status or Edema, Hemodynamic Status     Electronically signed by Ling Carpenter MS, RD, LD on 2/7/21 at 11:02 AM EST

## 2021-02-08 ENCOUNTER — ANESTHESIA (OUTPATIENT)
Dept: ENDOSCOPY | Age: 81
DRG: 391 | End: 2021-02-08
Payer: MEDICARE

## 2021-02-08 ENCOUNTER — ANESTHESIA EVENT (OUTPATIENT)
Dept: ENDOSCOPY | Age: 81
DRG: 391 | End: 2021-02-08
Payer: MEDICARE

## 2021-02-08 ENCOUNTER — APPOINTMENT (OUTPATIENT)
Dept: CT IMAGING | Age: 81
DRG: 391 | End: 2021-02-08
Payer: MEDICARE

## 2021-02-08 VITALS
RESPIRATION RATE: 18 BRPM | DIASTOLIC BLOOD PRESSURE: 57 MMHG | OXYGEN SATURATION: 100 % | SYSTOLIC BLOOD PRESSURE: 116 MMHG

## 2021-02-08 LAB
ALBUMIN SERPL-MCNC: 1.9 G/DL (ref 3.5–5.2)
ALP BLD-CCNC: 104 U/L (ref 35–104)
ALT SERPL-CCNC: 11 U/L (ref 0–32)
ANION GAP SERPL CALCULATED.3IONS-SCNC: 8 MMOL/L (ref 7–16)
AST SERPL-CCNC: 19 U/L (ref 0–31)
BILIRUB SERPL-MCNC: <0.2 MG/DL (ref 0–1.2)
BUN BLDV-MCNC: 28 MG/DL (ref 8–23)
CALCIUM SERPL-MCNC: 7.9 MG/DL (ref 8.6–10.2)
CHLORIDE BLD-SCNC: 102 MMOL/L (ref 98–107)
CO2: 27 MMOL/L (ref 22–29)
CREAT SERPL-MCNC: 1.4 MG/DL (ref 0.5–1)
GFR AFRICAN AMERICAN: 44
GFR NON-AFRICAN AMERICAN: 36 ML/MIN/1.73
GLUCOSE BLD-MCNC: 110 MG/DL (ref 74–99)
HCT VFR BLD CALC: 27.6 % (ref 34–48)
HEMOGLOBIN: 8.9 G/DL (ref 11.5–15.5)
MCH RBC QN AUTO: 27.1 PG (ref 26–35)
MCHC RBC AUTO-ENTMCNC: 32.2 % (ref 32–34.5)
MCV RBC AUTO: 83.9 FL (ref 80–99.9)
PDW BLD-RTO: 16.5 FL (ref 11.5–15)
PLATELET # BLD: 184 E9/L (ref 130–450)
PMV BLD AUTO: 11.2 FL (ref 7–12)
POTASSIUM SERPL-SCNC: 3.6 MMOL/L (ref 3.5–5)
RBC # BLD: 3.29 E12/L (ref 3.5–5.5)
SODIUM BLD-SCNC: 137 MMOL/L (ref 132–146)
TOTAL PROTEIN: 4.5 G/DL (ref 6.4–8.3)
WBC # BLD: 11.7 E9/L (ref 4.5–11.5)

## 2021-02-08 PROCEDURE — 2500000003 HC RX 250 WO HCPCS: Performed by: FAMILY MEDICINE

## 2021-02-08 PROCEDURE — 3700000000 HC ANESTHESIA ATTENDED CARE: Performed by: SURGERY

## 2021-02-08 PROCEDURE — 3700000001 HC ADD 15 MINUTES (ANESTHESIA): Performed by: SURGERY

## 2021-02-08 PROCEDURE — 3609012400 HC EGD TRANSORAL BIOPSY SINGLE/MULTIPLE: Performed by: SURGERY

## 2021-02-08 PROCEDURE — 43239 EGD BIOPSY SINGLE/MULTIPLE: CPT | Performed by: SURGERY

## 2021-02-08 PROCEDURE — 2580000003 HC RX 258: Performed by: FAMILY MEDICINE

## 2021-02-08 PROCEDURE — 0DB98ZX EXCISION OF DUODENUM, VIA NATURAL OR ARTIFICIAL OPENING ENDOSCOPIC, DIAGNOSTIC: ICD-10-PCS | Performed by: SURGERY

## 2021-02-08 PROCEDURE — 6360000004 HC RX CONTRAST MEDICATION: Performed by: RADIOLOGY

## 2021-02-08 PROCEDURE — 2500000003 HC RX 250 WO HCPCS: Performed by: RADIOLOGY

## 2021-02-08 PROCEDURE — 77002 NEEDLE LOCALIZATION BY XRAY: CPT | Performed by: RADIOLOGY

## 2021-02-08 PROCEDURE — 20610 DRAIN/INJ JOINT/BURSA W/O US: CPT

## 2021-02-08 PROCEDURE — 2060000000 HC ICU INTERMEDIATE R&B

## 2021-02-08 PROCEDURE — 2580000003 HC RX 258: Performed by: SURGERY

## 2021-02-08 PROCEDURE — 80053 COMPREHEN METABOLIC PANEL: CPT

## 2021-02-08 PROCEDURE — 6370000000 HC RX 637 (ALT 250 FOR IP): Performed by: SURGERY

## 2021-02-08 PROCEDURE — 20610 DRAIN/INJ JOINT/BURSA W/O US: CPT | Performed by: RADIOLOGY

## 2021-02-08 PROCEDURE — 0DB68ZX EXCISION OF STOMACH, VIA NATURAL OR ARTIFICIAL OPENING ENDOSCOPIC, DIAGNOSTIC: ICD-10-PCS | Performed by: SURGERY

## 2021-02-08 PROCEDURE — 6360000002 HC RX W HCPCS: Performed by: RADIOLOGY

## 2021-02-08 PROCEDURE — 88305 TISSUE EXAM BY PATHOLOGIST: CPT

## 2021-02-08 PROCEDURE — 2500000003 HC RX 250 WO HCPCS: Performed by: SURGERY

## 2021-02-08 PROCEDURE — 7100000000 HC PACU RECOVERY - FIRST 15 MIN: Performed by: SURGERY

## 2021-02-08 PROCEDURE — 2580000003 HC RX 258: Performed by: NURSE ANESTHETIST, CERTIFIED REGISTERED

## 2021-02-08 PROCEDURE — C9113 INJ PANTOPRAZOLE SODIUM, VIA: HCPCS | Performed by: FAMILY MEDICINE

## 2021-02-08 PROCEDURE — 6360000002 HC RX W HCPCS: Performed by: FAMILY MEDICINE

## 2021-02-08 PROCEDURE — 6360000002 HC RX W HCPCS: Performed by: SURGERY

## 2021-02-08 PROCEDURE — 6370000000 HC RX 637 (ALT 250 FOR IP): Performed by: INTERNAL MEDICINE

## 2021-02-08 PROCEDURE — 7100000001 HC PACU RECOVERY - ADDTL 15 MIN: Performed by: SURGERY

## 2021-02-08 PROCEDURE — 2709999900 HC NON-CHARGEABLE SUPPLY: Performed by: SURGERY

## 2021-02-08 PROCEDURE — C9113 INJ PANTOPRAZOLE SODIUM, VIA: HCPCS | Performed by: SURGERY

## 2021-02-08 PROCEDURE — 36415 COLL VENOUS BLD VENIPUNCTURE: CPT

## 2021-02-08 PROCEDURE — 88342 IMHCHEM/IMCYTCHM 1ST ANTB: CPT

## 2021-02-08 PROCEDURE — 85027 COMPLETE CBC AUTOMATED: CPT

## 2021-02-08 PROCEDURE — 6360000002 HC RX W HCPCS: Performed by: NURSE ANESTHETIST, CERTIFIED REGISTERED

## 2021-02-08 PROCEDURE — 0S9B3ZZ DRAINAGE OF LEFT HIP JOINT, PERCUTANEOUS APPROACH: ICD-10-PCS | Performed by: RADIOLOGY

## 2021-02-08 PROCEDURE — 6370000000 HC RX 637 (ALT 250 FOR IP): Performed by: FAMILY MEDICINE

## 2021-02-08 PROCEDURE — 2709999900 CT GUIDED NEEDLE PLACEMENT

## 2021-02-08 RX ORDER — PROPOFOL 10 MG/ML
INJECTION, EMULSION INTRAVENOUS PRN
Status: DISCONTINUED | OUTPATIENT
Start: 2021-02-08 | End: 2021-02-08 | Stop reason: SDUPTHER

## 2021-02-08 RX ORDER — MIDAZOLAM HYDROCHLORIDE 1 MG/ML
INJECTION INTRAMUSCULAR; INTRAVENOUS
Status: COMPLETED | OUTPATIENT
Start: 2021-02-08 | End: 2021-02-08

## 2021-02-08 RX ORDER — SUCRALFATE 1 G/1
1 TABLET ORAL EVERY 6 HOURS SCHEDULED
Status: DISCONTINUED | OUTPATIENT
Start: 2021-02-08 | End: 2021-02-10 | Stop reason: HOSPADM

## 2021-02-08 RX ORDER — FENTANYL CITRATE 50 UG/ML
INJECTION, SOLUTION INTRAMUSCULAR; INTRAVENOUS
Status: COMPLETED | OUTPATIENT
Start: 2021-02-08 | End: 2021-02-08

## 2021-02-08 RX ORDER — LIDOCAINE HYDROCHLORIDE 20 MG/ML
INJECTION, SOLUTION INFILTRATION; PERINEURAL
Status: COMPLETED | OUTPATIENT
Start: 2021-02-08 | End: 2021-02-08

## 2021-02-08 RX ORDER — PROPOFOL 10 MG/ML
INJECTION, EMULSION INTRAVENOUS CONTINUOUS PRN
Status: DISCONTINUED | OUTPATIENT
Start: 2021-02-08 | End: 2021-02-08 | Stop reason: SDUPTHER

## 2021-02-08 RX ORDER — SODIUM CHLORIDE 9 MG/ML
INJECTION, SOLUTION INTRAVENOUS CONTINUOUS PRN
Status: DISCONTINUED | OUTPATIENT
Start: 2021-02-08 | End: 2021-02-08 | Stop reason: SDUPTHER

## 2021-02-08 RX ADMIN — LEVOTHYROXINE SODIUM 50 MCG: 0.05 TABLET ORAL at 05:14

## 2021-02-08 RX ADMIN — PANTOPRAZOLE SODIUM 40 MG: 40 INJECTION, POWDER, FOR SOLUTION INTRAVENOUS at 09:10

## 2021-02-08 RX ADMIN — FENTANYL CITRATE 50 MCG: 50 INJECTION, SOLUTION INTRAMUSCULAR; INTRAVENOUS at 11:01

## 2021-02-08 RX ADMIN — SODIUM CHLORIDE: 9 INJECTION, SOLUTION INTRAVENOUS at 12:36

## 2021-02-08 RX ADMIN — IOPAMIDOL 3 ML: 612 INJECTION, SOLUTION INTRAVENOUS at 11:15

## 2021-02-08 RX ADMIN — Medication 1 TABLET: at 09:08

## 2021-02-08 RX ADMIN — PROPOFOL 20 MG: 10 INJECTION, EMULSION INTRAVENOUS at 12:43

## 2021-02-08 RX ADMIN — MIDAZOLAM 1 MG: 1 INJECTION INTRAMUSCULAR; INTRAVENOUS at 11:01

## 2021-02-08 RX ADMIN — LIDOCAINE HYDROCHLORIDE 4 ML: 20 INJECTION, SOLUTION INFILTRATION; PERINEURAL at 11:04

## 2021-02-08 RX ADMIN — HYDROCODONE BITARTRATE AND ACETAMINOPHEN 1 TABLET: 5; 325 TABLET ORAL at 16:56

## 2021-02-08 RX ADMIN — SODIUM CHLORIDE, PRESERVATIVE FREE 10 ML: 5 INJECTION INTRAVENOUS at 20:03

## 2021-02-08 RX ADMIN — HEPARIN SODIUM 5000 UNITS: 10000 INJECTION INTRAVENOUS; SUBCUTANEOUS at 05:14

## 2021-02-08 RX ADMIN — BUMETANIDE 1 MG: 0.25 INJECTION INTRAMUSCULAR; INTRAVENOUS at 20:03

## 2021-02-08 RX ADMIN — HEPARIN SODIUM 5000 UNITS: 10000 INJECTION INTRAVENOUS; SUBCUTANEOUS at 16:01

## 2021-02-08 RX ADMIN — FERROUS SULFATE TAB 325 MG (65 MG ELEMENTAL FE) 325 MG: 325 (65 FE) TAB at 09:09

## 2021-02-08 RX ADMIN — PROPOFOL 20 MG: 10 INJECTION, EMULSION INTRAVENOUS at 12:40

## 2021-02-08 RX ADMIN — SUCRALFATE 1 G: 1 TABLET ORAL at 23:05

## 2021-02-08 RX ADMIN — HYDROCODONE BITARTRATE AND ACETAMINOPHEN 1 TABLET: 5; 325 TABLET ORAL at 23:10

## 2021-02-08 RX ADMIN — CEFTRIAXONE SODIUM 1000 MG: 1 INJECTION, POWDER, FOR SOLUTION INTRAMUSCULAR; INTRAVENOUS at 02:23

## 2021-02-08 RX ADMIN — CARVEDILOL 6.25 MG: 6.25 TABLET, FILM COATED ORAL at 20:02

## 2021-02-08 RX ADMIN — BUMETANIDE 1 MG: 0.25 INJECTION INTRAMUSCULAR; INTRAVENOUS at 09:10

## 2021-02-08 RX ADMIN — PANTOPRAZOLE SODIUM 40 MG: 40 INJECTION, POWDER, FOR SOLUTION INTRAVENOUS at 20:02

## 2021-02-08 RX ADMIN — SODIUM CHLORIDE, PRESERVATIVE FREE 10 ML: 5 INJECTION INTRAVENOUS at 09:13

## 2021-02-08 RX ADMIN — PROPOFOL 30 MG: 10 INJECTION, EMULSION INTRAVENOUS at 12:41

## 2021-02-08 RX ADMIN — HEPARIN SODIUM 5000 UNITS: 10000 INJECTION INTRAVENOUS; SUBCUTANEOUS at 23:05

## 2021-02-08 RX ADMIN — ROSUVASTATIN 20 MG: 20 TABLET, FILM COATED ORAL at 09:10

## 2021-02-08 RX ADMIN — PROPOFOL 20 MG: 10 INJECTION, EMULSION INTRAVENOUS at 12:42

## 2021-02-08 RX ADMIN — SUCRALFATE 1 G: 1 TABLET ORAL at 16:58

## 2021-02-08 RX ADMIN — CARVEDILOL 12.5 MG: 6.25 TABLET, FILM COATED ORAL at 09:09

## 2021-02-08 RX ADMIN — Medication 2000 UNITS: at 09:09

## 2021-02-08 RX ADMIN — PROPOFOL 120 MCG/KG/MIN: 10 INJECTION, EMULSION INTRAVENOUS at 12:40

## 2021-02-08 RX ADMIN — FERROUS SULFATE TAB 325 MG (65 MG ELEMENTAL FE) 325 MG: 325 (65 FE) TAB at 16:57

## 2021-02-08 ASSESSMENT — PAIN DESCRIPTION - LOCATION
LOCATION: HIP
LOCATION: HIP

## 2021-02-08 ASSESSMENT — PAIN SCALES - GENERAL
PAINLEVEL_OUTOF10: 7
PAINLEVEL_OUTOF10: 3
PAINLEVEL_OUTOF10: 8
PAINLEVEL_OUTOF10: 10

## 2021-02-08 ASSESSMENT — PAIN DESCRIPTION - PAIN TYPE: TYPE: ACUTE PAIN

## 2021-02-08 NOTE — PROGRESS NOTES
Called floor and spoke to patient's nurse, Ruiz Wayne RN. Patient has been NPO since midnight and is able to provide his own consent. Asked floor to hold morning heparin dose until we present the procedure to Dr Deana Tompkins II. Floor will be notified when we have a time available.

## 2021-02-08 NOTE — PROGRESS NOTES
Patient arrived via bed with transport to Radiology department for left hip aspiration. Vital signs taken. 22g IV HL in place. Blood sample sent to lab for ordered tests. Procedural instructions given, questions answered, understanding expressed and consent signed. Patient given fluoroscopy education, no questions at this time.

## 2021-02-08 NOTE — PRE SEDATION
You Saenz II, MD  2/8/2021  10:56 AM        PRE-SEDATION PHYSICIAN ASSESSMENT:      1. HISTORY & PHYSICAL EXAMINATION:  Comments: none    Vitals:    02/08/21 1022   BP: (!) 155/79   Pulse: 79   Resp: 18   Temp:    SpO2: 92%       Allergies: Lodine [etodolac] and Penicillins    2. Heart and Lungs immediately prior to procedure demonstrate no contraindications to proceed      Chief Complaint: <principal problem not specified>    Drug: unknown  Tobacco: unknown    3. PAST ANESTHESIA EXPERIENCE:  unknown. 4. AIRWAY/TEETH/HEAD & NECK(Mallampati Classification):  II (soft palate, uvula, fauces visible)    5: NORMAL RANGE OF MOTION OF NECK: No    6. PATIENT WILL LIKELY TOLERATE PLAN OF MODERATE SEDATION    7. ASA 2.     Jewell Morgan MD

## 2021-02-08 NOTE — PROGRESS NOTES
Hospitalist Progress Note      SYNOPSIS: Patient admitted on 2021 presented to [facility] with past medical history of anemia, hypertension, osteoarthritis, coronary artery disease status post CABG, spinal stenosis, chronic back pain in pain management, she has spinal stimulator in the left of the abdomen, valvular heart disease-aortic stenosis status post bioprosthetic aortic valve replacement, lymphedema, obesity, ambulatory dysfunction, uses assistive device at home and CKD. Patient has been having pain in the left hip and lower back for about 6 days, pain is dull, constant, moderate in intensity, worse with movement and associated with worsening ambulatory dysfunction.        Vital signs notable for blood pressure 119/90, labs showed negative SARS-CoV-2, glucose 111, white count 14.8, hemoglobin 9.7, this was recently 10.5, potassium 3.4, creatinine 1.5, usual baseline 1.0, proBNP 3919, INR 1.3 and negative troponin. Urinalysis with greater than 20 white cells, 5-10 red cells with many bacteria and leukocyte esterase. Chest x-ray shows stable pleural thickening, x-ray of the left hip and pelvis showed healed pelvic rami fracture on the right, CT scan of the abdomen and pelvis shows thickening of the gastric antrum and proximal duodenum with surrounding fluid, ? Infectious/inflammatory process versus peptic ulcer disease, peripancreatic edema, trace pleural effusion and small ascites. SUBJECTIVE:    : Remains refusing MRI. Will dc MRI. She still complaints of left hip pain. She has been unable to ambulate due to pain. Patient for IR for aspiration of left hip to further assess.  Patient has a pain pump      Temp (24hrs), Av °F (36.1 °C), Min:96.6 °F (35.9 °C), Max:97.5 °F (36.4 °C)    DIET: Diet NPO, After Midnight Exceptions are: Sips of Water with Meds  CODE: Full Code    Intake/Output Summary (Last 24 hours) at 2021 0856  Last data filed at 2021 0523  Gross per 24 hour   Intake 240 ml Output 2550 ml   Net -2310 ml       Review of Systems  All bolded are positive; please see HPI  General:  Fever, chills, diaphoresis, fatigue, malaise, night sweats, weight loss  Psychological:  Anxiety, disorientation, hallucinations. ENT:  Epistaxis, headaches, vertigo, visual changes. Cardiovascular:  Chest pain, irregular heartbeats, palpitations, paroxysmal nocturnal dyspnea. Respiratory:  Shortness of breath, coughing, sputum production, hemoptysis, wheezing, orthopnea. Gastrointestinal:  Nausea, vomiting, diarrhea, heartburn, constipation, abdominal pain, hematemesis, hematochezia, melena, acholic stools  Genito-Urinary:  Dysuria, urgency, frequency, hematuria  Musculoskeletal:  Joint pain, joint stiffness, joint swelling, muscle pain  Neurology:  Headache, focal neurological deficits, weakness, numbness, paresthesia  Derm:  Rashes, ulcers, excoriations, bruising  Extremities:  Decreased ROM, peripheral edema, mottling      OBJECTIVE:    BP (!) 140/63   Pulse 84   Temp 97.5 °F (36.4 °C) (Temporal)   Resp 18   Ht 5' 4\" (1.626 m)   Wt 190 lb 6.4 oz (86.4 kg)   SpO2 94%   BMI 32.68 kg/m²     General appearance:  awake, somnolent and oriented to person, place, not time,  appears stated age and cooperative; no apparent distress no labored breathing  HEENT:  Conjunctivae/corneas clear. Neck: Supple. No jugular venous distention. Respiratory: symmetrical; clear to auscultation bilaterally; no wheezes; no rhonchi; no rales  Cardiovascular: rhythm regular; rate controlled; 2/6 systolic murmur at the base reviewed  Abdomen: Soft, nontender, nondistended  Extremities:  peripheral pulses present; no peripheral edema; no ulcers-there is significant swelling to the left arm which is the IV site  Musculoskeletal: No clubbing, cyanosis, no bilateral lower extremity edema. Brisk capillary refill.    Skin:  No rashes  on visible skin  Neurologic: awake, alert and following commands     ASSESSMENT and PLAN: · Gastroduodenitis-  PPI. General surgery following. Plan for EGD with biopsy   · UTI- IV Rocephin. Urine culture. Blood culture. Procalcitonin 0.71. · MEGHAN on CKD- patient has signs of volume overload and needs to be diuresed. Gentle diuresis, monitor I's and O's and daily weights. Nephrology consult if kidney function gets worse. Kidneys look normal on CT scan. · Acute on chronic diastolic CHF- patient with volume overload, gentle diuresis with Bumex. Last echo was in 2014, EF 70 to 75% with grade 1 diastolic dysfunction. Repeat echo. · Acute on chronic back pain-  L2 fracture on CT scan, obtain MRI of the lumbar spine to evaluate. Pain control. · Left hip pain- no acute findings on x-ray. Clinical presentation worrisome for septic hip. Ortho evaluation. IR for evaluation of hip  · Hypokalemia- replace and monitor  · Bilateral lower extremity edema with pain- Doppler ultrasound to rule out DVT neg. ?  Cellulitis. Rocephin should cover.   Free T4 decreased, start synthroid  · Coronary artery disease-   · Hyperlipidemia- statin  · Debility with ambulatory dysfunction- physical therapy as tolerated when appropriate  · Anemia- monitor hemoglobin  · Hypertension-       Patient has a pain pump        Medications:  REVIEWED DAILY    Infusion Medications   Scheduled Medications    heparin (porcine)  5,000 Units Subcutaneous Q8H    levothyroxine  50 mcg Oral Daily    pantoprazole  40 mg Intravenous BID    carvedilol  12.5 mg Oral QAM    carvedilol  6.25 mg Oral Nightly    vitamin D  2,000 Units Oral Daily    ferrous sulfate  325 mg Oral BID    lidocaine  1 patch Transdermal Daily    therapeutic multivitamin-minerals  1 tablet Oral Daily    rosuvastatin  20 mg Oral Daily    sodium chloride flush  10 mL Intravenous 2 times per day    cefTRIAXone (ROCEPHIN) IV  1,000 mg Intravenous Q24H    bumetanide  1 mg Intravenous BID

## 2021-02-08 NOTE — BRIEF OP NOTE
Brief Postoperative Note    Edilson Parham  YOB: 1940  [id number]    Pre-operative Diagnosis and Procedure: right hip asp    Post-operative Diagnosis: Same    Anesthesia: Local    Estimated Blood Loss: < 10 cc    Surgeon: Sung PALOMINO     Complications: none    Specimen obtained: none     Findings: none     Brice De La Paz II   2/8/2021 10:56 AM

## 2021-02-08 NOTE — ANESTHESIA PRE PROCEDURE
Department of Anesthesiology  Preprocedure Note       Name:  Edilson Parham   Age:  79 y.o.  :  1940                                          MRN:  [id number]         Date:  2021      Surgeon: Jeovany Brizuela):  Christine Davenport MD    Procedure: Procedure(s):  EGD DIAGNOSTIC ONLY    Medications prior to admission:   Prior to Admission medications    Medication Sig Start Date End Date Taking? Authorizing Provider   Biotin 51403 MCG TABS Take 10,000 mcg by mouth   Yes Historical Provider, MD   furosemide (LASIX) 40 MG tablet Take 1 tablet by mouth daily 3/11/20  Yes Kelly Almodovar MD   Multiple Vitamins-Minerals (THERAPEUTIC MULTIVITAMIN-MINERALS) tablet Take 1 tablet by mouth daily   Yes Historical Provider, MD   ferrous sulfate 325 (65 Fe) MG tablet Take 325 mg by mouth 2 times daily   Yes Historical Provider, MD   carvedilol (COREG) 6.25 MG tablet Take 6.25 mg by mouth nightly   Yes Historical Provider, MD   aspirin 81 MG tablet Take 81 mg by mouth daily   Yes Historical Provider, MD   carvedilol (COREG) 6.25 MG tablet Take 12.5 mg by mouth every morning    Yes Historical Provider, MD   clopidogrel (PLAVIX) 75 MG tablet Take 75 mg by mouth daily   Yes Historical Provider, MD   rosuvastatin (CRESTOR) 20 MG tablet Take 20 mg by mouth daily Pt. Takes every other day at home. Yes Historical Provider, MD   Calcium Carbonate-Vitamin D (CALCIUM + D) 600-200 MG-UNIT TABS Take by mouth 2 times daily    Yes Historical Provider, MD   Cholecalciferol (VITAMIN D) 2000 UNITS CAPS capsule Take 2,000 Units by mouth daily    Yes Historical Provider, MD   lidocaine 4 % external patch Place 1 patch onto the skin daily 3/11/20   Kelly Almodovar MD   mineral oil-hydrophilic petrolatum (HYDROPHOR) ointment Apply topically as needed. 3/11/20   Kelly Almodovar MD   nystatin (MYCOSTATIN) 877016 UNIT/GM ointment Apply topically 2 times daily.  3/11/20   Kelly Almodovar MD nystatin-triamcinolone (MYCOLOG II) 653281-4.1 UNIT/GM-% cream Apply topically 4 times daily.  3/11/20   Theresa Nova MD   docusate sodium (COLACE, DULCOLAX) 100 MG CAPS Take 100 mg by mouth 3 times daily 3/11/20   Theresa Nova MD   magnesium 30 MG tablet Take 250 mg by mouth nightly    Historical Provider, MD   acetaminophen (TYLENOL) 325 MG tablet Take 650 mg by mouth every 4 hours as needed for Pain (pain 1-3)    Historical Provider, MD       Current medications:    Current Facility-Administered Medications   Medication Dose Route Frequency Provider Last Rate Last Admin    heparin (porcine) injection 5,000 Units  5,000 Units Subcutaneous Q8H Juventino Adrian MD   5,000 Units at 02/08/21 0514    perflutren lipid microspheres (DEFINITY) injection 1.65 mg  1.5 mL Intravenous ONCE PRN Merline Schulz DO        levothyroxine (SYNTHROID) tablet 50 mcg  50 mcg Oral Daily AdonisRedcrest KELLI Nava DO   50 mcg at 02/08/21 0514    pantoprazole (PROTONIX) injection 40 mg  40 mg Intravenous BID Juventino Adrian MD   40 mg at 02/08/21 0910    acetaminophen (TYLENOL) tablet 650 mg  650 mg Oral Q4H PRN Juventino Adrian MD        carvedilol (COREG) tablet 12.5 mg  12.5 mg Oral QAM Juventino Adrian MD   12.5 mg at 02/08/21 0909    carvedilol (COREG) tablet 6.25 mg  6.25 mg Oral Nightly Juventino Adrian MD   6.25 mg at 02/07/21 2036    vitamin D (CHOLECALCIFEROL) tablet 2,000 Units  2,000 Units Oral Daily Juventino Adrian MD   2,000 Units at 02/08/21 0909    ferrous sulfate (IRON 325) tablet 325 mg  325 mg Oral BID Juventino Adrian MD   325 mg at 02/08/21 0909    lidocaine 4 % external patch 1 patch  1 patch Transdermal Daily Juventino Adrian MD   1 patch at 02/08/21 0908    therapeutic multivitamin-minerals 1 tablet  1 tablet Oral Daily Juventino Adrian MD   1 tablet at 02/08/21 0908    rosuvastatin (CRESTOR) tablet 20 mg  20 mg Oral Daily Juventino Adrian MD   20 mg at 02/08/21 5367  sodium chloride flush 0.9 % injection 10 mL  10 mL Intravenous 2 times per day Kevin Alonzo MD   10 mL at 02/08/21 0913    sodium chloride flush 0.9 % injection 10 mL  10 mL Intravenous PRN Kevin Alonzo MD        promethazine (PHENERGAN) tablet 12.5 mg  12.5 mg Oral Q6H PRN Kevin Alonzo MD        Or    ondansetron (ZOFRAN) injection 4 mg  4 mg Intravenous Q6H PRN Kevin Alonzo MD   4 mg at 02/05/21 2146    polyethylene glycol (GLYCOLAX) packet 17 g  17 g Oral Daily PRN Kevin Alonzo MD        HYDROcodone-acetaminophen (NORCO) 5-325 MG per tablet 1 tablet  1 tablet Oral Q6H PRN Kevin Alonzo MD   1 tablet at 02/07/21 0939    cefTRIAXone (ROCEPHIN) 1,000 mg in sterile water 10 mL IV syringe  1,000 mg Intravenous Q24H Kevin Alonzo MD   1,000 mg at 02/08/21 0223    bumetanide (BUMEX) injection 1 mg  1 mg Intravenous BID Kevin Alonzo MD   1 mg at 02/08/21 0910    [Held by provider] morphine (PF) injection 2 mg  2 mg Intravenous Q4H PRN Kevin Alonzo MD           Allergies: Allergies   Allergen Reactions    Lodine [Etodolac] Hives    Penicillins Hives       Problem List:    Patient Active Problem List   Diagnosis Code    Fall due to stumbling W01. 0XXA    Anemia D64.9    Essential hypertension I10    OA (osteoarthritis) M19.90    Spinal stenosis M48.00    Osteoporosis M81.0    Chronic pain syndrome G89.4    Facial contusion S00.83XA    Compression fx, lumbar spine (HCC) S32.000A    Compression fx, thoracic spine (HCC) S22.000A    Valvular heart disease I38    Tachycardia R00.0    Closed fracture of nasal bone S02. 2XXA    Acute kidney injury ([address] N17.9    Closed fracture of sacrum, initial encounter ([facility] [address] S32.10XA    DDD (degenerative disc disease), lumbar M51.36    Hypoalbuminemia E88.09    Ambulatory dysfunction R26.2    Debility R53.81    CKD (chronic kidney disease) N18.9    History of aortic valve replacement Z95.2  Multiple fracture T07. Lewanda Leak UTI (urinary tract infection) N39.0    Acute kidney injury superimposed on chronic kidney disease (HCC) N17.9, N18.9    Gastroduodenitis K29.90    Hypokalemia E87.6    Volume overload E87.70    Hip pain, left M25.552    Low back pain M54.5    CAD (coronary artery disease) I25.10    HLD (hyperlipidemia) E78.5    Closed fracture of second lumbar vertebra (HCC) S32.029A    Edema R60.9    Epigastric pain R10.13       Past Medical History:        Diagnosis Date    Anemia 11/22/2012    CAD (coronary artery disease)     Chronic back pain     CKD (chronic kidney disease)     Compression fracture of lumbar spine, non-traumatic (HCC)     Compression fracture of thoracic vertebra (HCC)     History of blood transfusion     HTN (hypertension) 11/22/2012    Hyperlipidemia     OA (osteoarthritis) 11/22/2012    Osteoporosis 11/22/2012    S/P AVR     TISSUE    Valvular heart disease 11/22/2012       Past Surgical History:        Procedure Laterality Date    APPENDECTOMY      CARDIAC SURGERY      CHOLECYSTECTOMY      CORONARY ARTERY BYPASS GRAFT      ECHO COMPL W DOP COLOR FLOW  11/23/2012         LYMPHADENECTOMY Right     upper rt leg lymph node removed over 20 years ago    PAIN MANAGEMENT PROCEDURE      MORPHINE PAIN PUMP    SKIN BIOPSY      TONSILLECTOMY         Social History:    Social History     Tobacco Use    Smoking status: Never Smoker    Smokeless tobacco: Never Used   Substance Use Topics    Alcohol use:  No                                Counseling given: Not Answered      Vital Signs (Current):   Vitals:    02/08/21 1056 02/08/21 1101 02/08/21 1106 02/08/21 1111   BP: 135/67 (!) 141/78 138/85 (!) 127/57   Pulse: 89 90 86 87   Resp: 27 23 21 24   Temp:       TempSrc:       SpO2: 97% 98% 97% 98%   Weight:       Height:                                                  BP Readings from Last 3 Encounters:   02/08/21 (!) 127/57   03/11/20 (!) 108/58 02/20/20 133/71       NPO Status:                                                                                 BMI:   Wt Readings from Last 3 Encounters:   02/08/21 190 lb 6.4 oz (86.4 kg)   02/27/20 180 lb 4.8 oz (81.8 kg)   02/19/20 188 lb 14.4 oz (85.7 kg)     Body mass index is 32.68 kg/m². CBC:   Lab Results   Component Value Date    WBC 11.7 02/08/2021    RBC 3.29 02/08/2021    HGB 8.9 02/08/2021    HCT 27.6 02/08/2021    MCV 83.9 02/08/2021    RDW 16.5 02/08/2021     02/08/2021       CMP:   Lab Results   Component Value Date     02/08/2021    K 3.6 02/08/2021    K 3.6 02/06/2021     02/08/2021    CO2 27 02/08/2021    BUN 28 02/08/2021    CREATININE 1.4 02/08/2021    GFRAA 44 02/08/2021    LABGLOM 36 02/08/2021    GLUCOSE 110 02/08/2021    PROT 4.5 02/08/2021    CALCIUM 7.9 02/08/2021    BILITOT <0.2 02/08/2021    ALKPHOS 104 02/08/2021    AST 19 02/08/2021    ALT 11 02/08/2021       POC Tests: No results for input(s): POCGLU, POCNA, POCK, POCCL, POCBUN, POCHEMO, POCHCT in the last 72 hours.     Coags:   Lab Results   Component Value Date    PROTIME 14.6 02/05/2021    INR 1.3 02/05/2021    APTT 22.7 02/05/2021       HCG (If Applicable): No results found for: PREGTESTUR, PREGSERUM, HCG, HCGQUANT     ABGs: No results found for: PHART, PO2ART, SGH0HZX, RPP9MHI, BEART, J7JBRSXN     Type & Screen (If Applicable):  No results found for: LABABO, LABRH    Drug/Infectious Status (If Applicable):  No results found for: HIV, HEPCAB    COVID-19 Screening (If Applicable):   Lab Results   Component Value Date    COVID19 Not Detected 02/05/2021         Anesthesia Evaluation  Patient summary reviewed and Nursing notes reviewed no history of anesthetic complications:   Airway: Mallampati: III        Dental:      Comment: None loose    Pulmonary:Negative Pulmonary ROS breath sounds clear to auscultation                             Cardiovascular:    (+) hypertension:, CAD:, CABG/stent:,

## 2021-02-08 NOTE — OP NOTE
EGD Op Note    DATE OF PROCEDURE: 2/8/2021     SURGEON: Christine Davenport MD    PREOPERATIVE DIAGNOSIS: gastritis abd pain     POSTOPERATIVE DIAGNOSIS: Same, duodenal bulb ulcers with biopsy, proximal gastritis with biopsy     OPERATION: Procedure(s):  EGD BIOPSY    ANESTHESIA: Local monitored anesthesia. ESTIMATED BLOOD LOSS: minimal    COMPLICATIONS: None. SPECIMENS:    ID Type Source Tests Collected by Time Destination   A : Duodenal Bx Tissue Duodenum SURGICAL PATHOLOGY Christine Davenport MD 2/8/2021 1250    B : Proximal Gastric Bx Tissue Stomach SURGICAL PATHOLOGY Christine Davenport MD 2/8/2021 1250        HISTORY: The patient is a 79y.o. year old female with history of above preop diagnosis. I recommended esophagogastroduodenoscopy with possible biopsy and I explained the risk, benefits, expected outcome, and alternatives to the procedure. Risks included but are not limited to bleeding, infection, respiratory distress, hypotension, and perforation of the esophagus, stomach, or duodenum. Patient understands and is in agreement. PROCEDURE: The patient was given IV conscious sedation per anesthesia. The patient was given supplemental oxygen by nasal cannula. The gastroscope was inserted orally and advanced under direct vision through the esophagus, through the stomach, through the pylorus, and into the duodenum. Findings:  Duodenum:     Descending: wnl     Bulb: moderate to severe duodenitis with large flat ulcers with fibrinous base no bleeding vessel, bx taken cold forceps     Stomach:    Antrum: moderate gastritis     Body: same     Fundus: prominent rugal folds abnormal appearance of mucosa, bx taken ? Malignancy     Esophagus: 40 cm wnl,   GE junction:    Larynx: not examined    The scope was removed and the patient tolerated the procedure well. IMPRESSION/PLAN:   1. PPI carafate, Fu Bx, PUD diet.        Christine Davenport MD  02/08/21  1:00 PM

## 2021-02-08 NOTE — PROGRESS NOTES
Department of Orthopedic Surgery  Resident Progress Note    Patient seen and examined, resting in bed this morning. Patient states she continues to have pain in the left hip this morning. No new complaints. Patient cannot actively move her left leg due to pain and patient co-morbidities. VITALS:  BP (!) 140/63   Pulse 84   Temp 97.5 °F (36.4 °C) (Temporal)   Resp 18   Ht 5' 4\" (1.626 m)   Wt 190 lb 6.4 oz (86.4 kg)   SpO2 94%   BMI 32.68 kg/m²     General: alert and oriented to person, place and time, well-developed and well-nourished, in no acute distress    MUSCULOSKELETAL:   left lower extremity:  · Exam of the left lower extremity continues to be limited due to patient's body habitus and poor overall mobility. She remains stiff at the left knee which prohibits a thorough hip exam  · Mild discomfort with gentle logroll of the left leg  · Minimal discomfort with palpation over the proximal lateral thigh  · +PF/DF/EHL  · +2/4 DP & PT pulses, Brisk Cap refill, Toes warm and perfused  · Distal sensation grossly intact to Peroneals, Sural, Saphenous, and tibial nrs    CBC:   Lab Results   Component Value Date    WBC 12.4 02/07/2021    HGB 9.0 02/07/2021    HCT 28.1 02/07/2021     02/07/2021     PT/INR:    Lab Results   Component Value Date    PROTIME 14.6 02/05/2021    INR 1.3 02/05/2021       ASSESSMENT  · Left hip pain-rule out septic hip joint    PLAN      · Patient continues to have left hip pain on exam this morning.  Given patient's intractable pain with elevated ESR/CRP and difficulty with left hip motion, will request IR aspiration of left hip to further assess  · Sed rate: 73, CRP: 13.6  · Continue physical therapy and protocol: WBAT - LLE  · Antibiotics per infectious disease recommendations  · Deep venous thrombosis prophylaxis -per primary  · PT/OT as able  · Pain Control per primary  · We will continue to monitor for clinical improvement  · Discuss with Dr. Sylvain Loya

## 2021-02-08 NOTE — POST SEDATION
POST SEDATION NOTE:  Time: 10:56 AM    Cardiopulmonary: Vitals Signs Stable: yes    Level of Consciousness: alert    Reversal Agent Used: No    Complications: none    Follow-up/Observations: none    Pain Score: 1    Brian Moffett MD

## 2021-02-09 LAB
ALBUMIN SERPL-MCNC: 2 G/DL (ref 3.5–5.2)
ALP BLD-CCNC: 98 U/L (ref 35–104)
ALT SERPL-CCNC: 14 U/L (ref 0–32)
ANION GAP SERPL CALCULATED.3IONS-SCNC: 10 MMOL/L (ref 7–16)
AST SERPL-CCNC: 22 U/L (ref 0–31)
BILIRUB SERPL-MCNC: <0.2 MG/DL (ref 0–1.2)
BUN BLDV-MCNC: 25 MG/DL (ref 8–23)
CALCIUM SERPL-MCNC: 8.2 MG/DL (ref 8.6–10.2)
CHLORIDE BLD-SCNC: 99 MMOL/L (ref 98–107)
CO2: 25 MMOL/L (ref 22–29)
CREAT SERPL-MCNC: 1.3 MG/DL (ref 0.5–1)
GFR AFRICAN AMERICAN: 48
GFR NON-AFRICAN AMERICAN: 39 ML/MIN/1.73
GLUCOSE BLD-MCNC: 93 MG/DL (ref 74–99)
HCT VFR BLD CALC: 27.2 % (ref 34–48)
HEMOGLOBIN: 8.9 G/DL (ref 11.5–15.5)
LV EF: 65 %
LVEF MODALITY: NORMAL
MCH RBC QN AUTO: 27.4 PG (ref 26–35)
MCHC RBC AUTO-ENTMCNC: 32.7 % (ref 32–34.5)
MCV RBC AUTO: 83.7 FL (ref 80–99.9)
PDW BLD-RTO: 16.1 FL (ref 11.5–15)
PLATELET # BLD: 212 E9/L (ref 130–450)
PMV BLD AUTO: 11 FL (ref 7–12)
POTASSIUM SERPL-SCNC: 3.3 MMOL/L (ref 3.5–5)
RBC # BLD: 3.25 E12/L (ref 3.5–5.5)
SODIUM BLD-SCNC: 134 MMOL/L (ref 132–146)
TOTAL PROTEIN: 4.6 G/DL (ref 6.4–8.3)
WBC # BLD: 12 E9/L (ref 4.5–11.5)

## 2021-02-09 PROCEDURE — 99222 1ST HOSP IP/OBS MODERATE 55: CPT | Performed by: NURSE PRACTITIONER

## 2021-02-09 PROCEDURE — 2500000003 HC RX 250 WO HCPCS: Performed by: SURGERY

## 2021-02-09 PROCEDURE — 6370000000 HC RX 637 (ALT 250 FOR IP): Performed by: SURGERY

## 2021-02-09 PROCEDURE — 2580000003 HC RX 258: Performed by: STUDENT IN AN ORGANIZED HEALTH CARE EDUCATION/TRAINING PROGRAM

## 2021-02-09 PROCEDURE — 80053 COMPREHEN METABOLIC PANEL: CPT

## 2021-02-09 PROCEDURE — 6360000002 HC RX W HCPCS: Performed by: SURGERY

## 2021-02-09 PROCEDURE — 36415 COLL VENOUS BLD VENIPUNCTURE: CPT

## 2021-02-09 PROCEDURE — 2580000003 HC RX 258: Performed by: SURGERY

## 2021-02-09 PROCEDURE — 97162 PT EVAL MOD COMPLEX 30 MIN: CPT

## 2021-02-09 PROCEDURE — 97530 THERAPEUTIC ACTIVITIES: CPT

## 2021-02-09 PROCEDURE — 6360000004 HC RX CONTRAST MEDICATION: Performed by: SURGERY

## 2021-02-09 PROCEDURE — 97166 OT EVAL MOD COMPLEX 45 MIN: CPT

## 2021-02-09 PROCEDURE — 85027 COMPLETE CBC AUTOMATED: CPT

## 2021-02-09 PROCEDURE — 97535 SELF CARE MNGMENT TRAINING: CPT

## 2021-02-09 PROCEDURE — 93306 TTE W/DOPPLER COMPLETE: CPT

## 2021-02-09 PROCEDURE — C9113 INJ PANTOPRAZOLE SODIUM, VIA: HCPCS | Performed by: SURGERY

## 2021-02-09 PROCEDURE — 99232 SBSQ HOSP IP/OBS MODERATE 35: CPT | Performed by: SURGERY

## 2021-02-09 PROCEDURE — 2060000000 HC ICU INTERMEDIATE R&B

## 2021-02-09 RX ORDER — PANTOPRAZOLE SODIUM 40 MG/1
40 TABLET, DELAYED RELEASE ORAL
Status: DISCONTINUED | OUTPATIENT
Start: 2021-02-09 | End: 2021-02-10 | Stop reason: HOSPADM

## 2021-02-09 RX ORDER — SODIUM CHLORIDE, SODIUM LACTATE, POTASSIUM CHLORIDE, CALCIUM CHLORIDE 600; 310; 30; 20 MG/100ML; MG/100ML; MG/100ML; MG/100ML
INJECTION, SOLUTION INTRAVENOUS CONTINUOUS
Status: DISCONTINUED | OUTPATIENT
Start: 2021-02-09 | End: 2021-02-10 | Stop reason: HOSPADM

## 2021-02-09 RX ADMIN — SODIUM CHLORIDE, POTASSIUM CHLORIDE, SODIUM LACTATE AND CALCIUM CHLORIDE: 600; 310; 30; 20 INJECTION, SOLUTION INTRAVENOUS at 15:58

## 2021-02-09 RX ADMIN — Medication 1 TABLET: at 09:16

## 2021-02-09 RX ADMIN — HEPARIN SODIUM 5000 UNITS: 10000 INJECTION INTRAVENOUS; SUBCUTANEOUS at 23:48

## 2021-02-09 RX ADMIN — CARVEDILOL 6.25 MG: 6.25 TABLET, FILM COATED ORAL at 21:09

## 2021-02-09 RX ADMIN — SODIUM CHLORIDE, PRESERVATIVE FREE 10 ML: 5 INJECTION INTRAVENOUS at 21:09

## 2021-02-09 RX ADMIN — HYDROCODONE BITARTRATE AND ACETAMINOPHEN 1 TABLET: 5; 325 TABLET ORAL at 05:16

## 2021-02-09 RX ADMIN — CARVEDILOL 12.5 MG: 6.25 TABLET, FILM COATED ORAL at 09:16

## 2021-02-09 RX ADMIN — CEFTRIAXONE SODIUM 1000 MG: 1 INJECTION, POWDER, FOR SOLUTION INTRAMUSCULAR; INTRAVENOUS at 02:33

## 2021-02-09 RX ADMIN — SUCRALFATE 1 G: 1 TABLET ORAL at 17:52

## 2021-02-09 RX ADMIN — LEVOTHYROXINE SODIUM 50 MCG: 0.05 TABLET ORAL at 05:16

## 2021-02-09 RX ADMIN — SUCRALFATE 1 G: 1 TABLET ORAL at 23:48

## 2021-02-09 RX ADMIN — SUCRALFATE 1 G: 1 TABLET ORAL at 05:15

## 2021-02-09 RX ADMIN — ROSUVASTATIN 20 MG: 20 TABLET, FILM COATED ORAL at 09:17

## 2021-02-09 RX ADMIN — SODIUM CHLORIDE, PRESERVATIVE FREE 10 ML: 5 INJECTION INTRAVENOUS at 09:17

## 2021-02-09 RX ADMIN — PERFLUTREN 1.65 MG: 6.52 INJECTION, SUSPENSION INTRAVENOUS at 09:54

## 2021-02-09 RX ADMIN — PANTOPRAZOLE SODIUM 40 MG: 40 INJECTION, POWDER, FOR SOLUTION INTRAVENOUS at 09:17

## 2021-02-09 RX ADMIN — PANTOPRAZOLE SODIUM 40 MG: 40 TABLET, DELAYED RELEASE ORAL at 15:58

## 2021-02-09 RX ADMIN — FERROUS SULFATE TAB 325 MG (65 MG ELEMENTAL FE) 325 MG: 325 (65 FE) TAB at 17:52

## 2021-02-09 RX ADMIN — Medication 2000 UNITS: at 09:16

## 2021-02-09 RX ADMIN — BUMETANIDE 1 MG: 0.25 INJECTION INTRAMUSCULAR; INTRAVENOUS at 21:09

## 2021-02-09 RX ADMIN — SODIUM CHLORIDE, POTASSIUM CHLORIDE, SODIUM LACTATE AND CALCIUM CHLORIDE: 600; 310; 30; 20 INJECTION, SOLUTION INTRAVENOUS at 05:45

## 2021-02-09 RX ADMIN — HEPARIN SODIUM 5000 UNITS: 10000 INJECTION INTRAVENOUS; SUBCUTANEOUS at 09:17

## 2021-02-09 RX ADMIN — ACETAMINOPHEN 650 MG: 325 TABLET ORAL at 15:58

## 2021-02-09 RX ADMIN — FERROUS SULFATE TAB 325 MG (65 MG ELEMENTAL FE) 325 MG: 325 (65 FE) TAB at 09:16

## 2021-02-09 RX ADMIN — BUMETANIDE 1 MG: 0.25 INJECTION INTRAMUSCULAR; INTRAVENOUS at 09:17

## 2021-02-09 RX ADMIN — SUCRALFATE 1 G: 1 TABLET ORAL at 11:33

## 2021-02-09 RX ADMIN — HEPARIN SODIUM 5000 UNITS: 10000 INJECTION INTRAVENOUS; SUBCUTANEOUS at 15:58

## 2021-02-09 ASSESSMENT — PAIN DESCRIPTION - PAIN TYPE: TYPE: ACUTE PAIN

## 2021-02-09 ASSESSMENT — PAIN DESCRIPTION - ORIENTATION: ORIENTATION: LEFT

## 2021-02-09 ASSESSMENT — PAIN SCALES - GENERAL: PAINLEVEL_OUTOF10: 7

## 2021-02-09 ASSESSMENT — PAIN DESCRIPTION - LOCATION: LOCATION: HIP

## 2021-02-09 NOTE — PROGRESS NOTES
Physician Progress Note      PATIENT:               Cora Clarke  CSN #:                  [id number]  :                       1940  ADMIT DATE:       2021 3:47 PM  [address] [place] DATE:  RESPONDING  PROVIDER #:        Clayton Garcia DO          QUERY TEXT:    Pt admitted with UTI and Gastro duodenitis . Pt noted to have WBC 14.8 and CRP   13.6. If possible, please document in the progress notes and discharge   summary if you are evaluating and /or treating any of the following: The medical record reflects the following:  Risk Factors: UTI and gastro duodenitis  Clinical Indicators: Labs on admit- WBC 14.8 Lactic 1.3 CRP 13.6 VS on admit   97.8, 82, 16, 115/92, 96% RA; per H&P UTI, IV Rocephin. Urine culture  Treatment: IV Antibiotics, Serial labs,  cultures, VS monitoring, continued   inpatient monitoring on an intermediate telemetry unit    Thank you  Modesto Post BSN, RN, CCDS  Clinical Documentation Improvement  Options provided:  -- Sepsis, present on admission  -- Sepsis, present on admission, now resolved  -- Sepsis, not present on admission  -- No Sepsis,UTI only  -- Other - I will add my own diagnosis  -- Disagree - Not applicable / Not valid  -- Disagree - Clinically unable to determine / Unknown  -- Refer to Clinical Documentation Reviewer    PROVIDER RESPONSE TEXT:    This patient has sepsis that was not present on admission.     Query created by: Yariel Loera on  0:96 AM      Electronically signed by:  Clayton Garcia DO 2021 9:33 AM

## 2021-02-09 NOTE — PROGRESS NOTES
Department of Orthopedic Surgery  Resident Progress Note    Patient seen and examined, resting in bed this morning. Patient states she continues to have pain in the left hip this morning in addition to bilateral knees. No new complaints. Patient cannot actively move her left leg due to pain and patient co-morbidities. She did undergo attempted aspiration of her left hip by IR yesterday. VITALS:  BP (!) 140/60   Pulse 80   Temp 98.7 °F (37.1 °C) (Temporal)   Resp 18   Ht 5' 4\" (1.626 m)   Wt 189 lb (85.7 kg)   SpO2 95%   BMI 32.44 kg/m²     General: alert and oriented to person, place and time, well-developed and well-nourished, in no acute distress    MUSCULOSKELETAL:   left lower extremity:  · Exam of the left lower extremity continues to be limited due to patient's body habitus and poor overall mobility. She remains stiff at the left knee which prohibits a thorough hip exam  · Mild discomfort with gentle logroll of the left leg  · Minimal discomfort with palpation over the proximal lateral thigh  · +PF/DF/EHL  · +2/4 DP & PT pulses, Brisk Cap refill, Toes warm and perfused  · Distal sensation grossly intact to Peroneals, Sural, Saphenous, and tibial nrs    CBC:   Lab Results   Component Value Date    WBC 11.7 02/08/2021    HGB 8.9 02/08/2021    HCT 27.6 02/08/2021     02/08/2021     PT/INR:    Lab Results   Component Value Date    PROTIME 14.6 02/05/2021    INR 1.3 02/05/2021       ASSESSMENT  · Left hip pain-rule out septic hip joint    PLAN      · Results of IR aspiration of the left hip joint did not yield any fluid/specimen for evaluation.  Due to this, it is less likely there is a septic process in the left hip joint  · Sed rate: 73, CRP: 13.6  · Continue physical therapy and protocol: WBAT - LLE  · Antibiotics per infectious disease recommendations  · Deep venous thrombosis prophylaxis -per primary  · PT/OT as able  · Pain Control per primary · No acute orthopedic intervention at this time. We will continue to monitor for clinical improvement peripherally  · Discuss with Dr. Sukh Mahmood with resident assessment and we have discussed the patient. We will continue to follow peripherally. She can follow-up in the office in 4 to 6 weeks if she is still having hip pain after discharge.     Lashaun Nguyen MD  [address] [place] [address]

## 2021-02-09 NOTE — CONSULTS
Palliative Care Department  [phone]  Palliative Care Initial Consult  Provider Minh Huang [facility]    [address]  [id number]  Hospital Day: 5  Date of Initial Consult: 2/9/2021  Referring Provider: John Belle DO  Palliative Medicine was consulted for assistance with: Symptom Management    HPI:   [address] is a 79 y.o. with a medical history of anemia, hypertension, osteoarthritis, CAD s/p CABG, spinal stenosis, chronic back pain, spinal stimulator left abdomen, valvular heart disease, aortic stenosis s/p bioprosthetic aortic valve replacement, CKD who was admitted on 2/5/2021 from home with a CHIEF COMPLAINT of left hip and back pain. CT abdomen pelvis showed inflammation at the gastric antrum concerning for possible PUD. General surgery following. Patient was taken for EGD biopsy on 2/8. Palliative medicine consulted for symptom management. PDMP reviewed. Patient follows with pain management. ASSESSMENT/PLAN:     Pertinent Hospital Diagnoses     ? Gastroduodenitis  ? Duodenal ulcers  ? Left hip pain      Palliative Care Encounter / Counseling Regarding Goals of Care  Please see detailed goals of care discussion as below  ? At this time, [address], Does have capacity for medical decision-making. Capacity is time limited and situation/question specific  ? Outcome of goals of care meeting: Continue current management, will defer symptom management as patient has chronic pain and is an established patient of [facility] for chronic pain  ? Code status Full Code  ? Advanced Directives: no POA or living will in [facility]  ?  Surrogate/Legal Alejandro Chisholm, child, [phone], [phone]      Spiritual assessment: no spiritual distress identified  Bereavement and grief: to be determined  Referrals to: none today  SUBJECTIVE:     Current medical issues leading to Palliative Medicine involvement include   Active Hospital Problems    Diagnosis Date Noted    Epigastric pain [R10.13]  UTI (urinary tract infection) [N39.0] 02/05/2021    Acute kidney injury superimposed on chronic kidney disease ([facility] [address] [N17.9, N18.9] 02/05/2021    Gastroduodenitis [K29.90] 02/05/2021    Hypokalemia [E87.6] 02/05/2021    Volume overload [E87.70] 02/05/2021    Hip pain, left [M25.552] 02/05/2021    Low back pain [M54.5] 02/05/2021    CAD (coronary artery disease) [I25.10] 02/05/2021    HLD (hyperlipidemia) [E78.5] 02/05/2021    Closed fracture of second lumbar vertebra ([facility] [address] Erick Gathers 02/05/2021    Edema [R60.9] 02/05/2021    Debility [R53.81] 02/18/2020    Ambulatory dysfunction [R26.2] 02/18/2020    Anemia [D64.9] 11/22/2012    Essential hypertension [I10] 11/22/2012    Chronic pain syndrome [G89.4] 11/22/2012    Compression fx, lumbar spine ([facility] [address] [H48.629L] 11/22/2012       Details of Conversation: Chart reviewed and patient seen. Patient resting in bed, alert and oriented. Role of palliative medicine explained. Discussion regarding symptom management. Patient complains of chronic neck, back, and now hip pain. Patient states that she thought her pain pump was going to help with her hip pain. Patient states she follows with [facility] for chronic pain. Will defer symptom management as patient is a chronic pain patient. Charge RN updated. Palliative medicine will sign off.         OBJECTIVE:   Prognosis: unknown    Physical Exam:  BP (!) 117/55   Pulse 76   Temp 98.9 °F (37.2 °C) (Temporal)   Resp 19   Ht 5' 4\" (1.626 m)   Wt 189 lb (85.7 kg)   SpO2 94%   BMI 32.44 kg/m²   Constitutional:  Asleep, awakens easily to voice  ENMT:  Normocephalic, atraumatic, mucosa moist, EOMI  Lungs: Room air, easy and unlabored respirations  Heart:  RRR  Abd:  Soft, non tender  Ext:  + edema, pulses present  Skin:  Warm and dry  Neuro: Alert and oriented    Objective data reviewed: labs, images, records, medication use, vitals and chart Discussed patient and the plan of care with the other IDT members: Palliative Medicine IDT Team    Time/Communication  Greater than 50% of time spent, total 50 minutes in counseling and coordination of care at the bedside regarding goals of care, symptom management, diagnosis and prognosis and see above. Thank you for allowing Palliative Medicine to participate in the care of Lucina Camacho.

## 2021-02-09 NOTE — ANESTHESIA POSTPROCEDURE EVALUATION
Department of Anesthesiology  Postprocedure Note    Patient: Jasmyn Peck  MRN: [id number]  YOB: 1940  Date of evaluation: 2/8/2021  Time:  8:05 PM     Procedure Summary     Date: 02/08/21 Room / Location: [facility] / [facility]    Anesthesia Start: 1236 Anesthesia Stop: 9632    Procedure: EGD BIOPSY (N/A ) Diagnosis: (anemia)    Surgeons: Leonard Vincent MD Responsible Provider: Hailey Allison DO    Anesthesia Type: MAC ASA Status: 3          Anesthesia Type: No value filed. Harmony Phase I: Harmony Score: 10    Harmony Phase II:      Last vitals: Reviewed and per EMR flowsheets.        Anesthesia Post Evaluation    Patient location during evaluation: PACU  Patient participation: complete - patient participated  Level of consciousness: awake and alert  Pain score: 1  Airway patency: patent  Nausea & Vomiting: no nausea and no vomiting  Complications: no  Cardiovascular status: hemodynamically stable  Respiratory status: acceptable  Hydration status: euvolemic

## 2021-02-09 NOTE — PROGRESS NOTES
Physical Therapy  Physical Therapy Initial Assessment   Name: Brady Dinero  : 1940  MRN: [id number]    Referring Provider:  Brittany Gregorio DO    Date of Service: 2021    Evaluating PT:  Fransisco Walters, PT, DPT [id number]    Room #:  4080/9350-B  Diagnosis:  MEGHAN  PMHx/PSHx:  Anemia, HTN, OA, osteoporosis, valvular heart disease, CKD, CAD, compression fx of thoracic and lumbar spine, chronic back pain, HLD  Precautions:  Falls, WBAT LLE, severe L hip pain  Equipment Needs:  Front [employer]    SUBJECTIVE:    Pt lives alone in a 1 story home with 5 stairs to enter and 1 rail. Bed is on first floor and bath is on first floor. Pt ambulated with rollator PTA. Pt reports family assists with transportation and household duties. Pt reports very minimal activity at home. Pt reporting worsening L hip pain that has further reduced her mobility. OBJECTIVE:   Initial Evaluation  Date: 2021 Treatment Short Term/ Long Term   Goals   AM-PAC 6 Clicks 3/51     Was pt agreeable to Eval/treatment? yes     Does pt have pain? 10/10 L hip, L knee, neck.  RN aware     Bed Mobility  Rolling: maxA  Supine to sit: maxA  Sit to supine: maxA  Scooting: maxA  Rolling: Julissa  Supine to sit: Julissa  Sit to supine: Julissa  Scooting: Julissa   Transfers Sit to stand: maxA  Stand to sit: maxA  Stand pivot: NT, pt unable  Sit to stand: Julissa  Stand to sit: Julissa  Stand pivot: Julissa front [employer]   Ambulation    NT, pt unable to advance BLE  25 feet with front [employer] negotiation: ascended and descended  NT  5 steps with 1 rail maxA   ROM BUE:  Per OT note  BLE:  Limited by pain     Strength BUE:  Per OT note  BLE:  NT, d/t pain     Balance Sitting EOB:  SBA  Dynamic Standing:  NT  Static standing: maxA front [employer]  Sitting EOB:  Independent  Dynamic Standing:  Julissa front [employer]     Pt is A & O x 4  Sensation:  Pt denies numbness and tingling to extremities  Edema:  unremarkable ? Bed mobility: maxA all aspects. Max VC and hand over hand guidance to facilitate efficient use of BUE on bed rail to promote more independent completion of task. Pt required assistance to advance BLE towards EOB as well as lift trunk into sitting position. At EOB, pt sat at SBA level while performing therapeutic exercises as noted above. ? Transfer training: STS maxA performed x 2 reps. Max VC for proper hand placement and sequencing. Pt unable to tolerate static stand for longer than 15 seconds. Attempted side steps at EOB, but pt was unable to advance BLE despite maxA for lateral weight shift.  o Skilled repositioning in supine with bed in chair position. BLE propped on pillows for comfort.    o Pt education as noted above. Pt's/ family goals   1. ASHLYN    Patient and or family understand(s) diagnosis, prognosis, and plan of care. yes    PLAN OF CARE:    Current Treatment Recommendations     [x] Strengthening     [x] ROM   [x] Balance Training   [x] Endurance Training   [x] Transfer Training   [x] Gait Training   [x] Stair Training   [x] Positioning   [x] Safety and Education Training   [x] Patient/Caregiver Education   [] HEP  [] Other     PT care will be provided in accordance with the objectives noted above. The above treatment recommendations will be utilized to address deficits described above in order to restore pt's prior level of function and/or achieve modified functional independence with adaptive strategies. Frequency of treatments: 2-5x/week x 1-2 weeks. Time in  1045  Time out  1115    Total Treatment Time  25 minutes     Evaluation Time includes thorough review of current medical information, gathering information on past medical history/social history and prior level of function, completion of standardized testing/informal observation of tasks, assessment of data and education on plan of care and goals.     CPT codes:  [] Low Complexity PT evaluation 83548 [x] Moderate Complexity PT evaluation 33820  [] High Complexity PT evaluation [id number]  [] PT Re-evaluation 53277  [] Gait training 25865 0 minutes  [] Manual therapy 73189 0 minutes  [x] Therapeutic activities 64429 25 minutes  [] Therapeutic exercises 72655 0 minutes  [] Neuromuscular reeducation 07074 0 minutes     Anu Guevara, PT, DPT  [id number]

## 2021-02-09 NOTE — CARE COORDINATION
Met with pt and discussed discharge planning/transition of care. Pt lives alone in a one story home with 5 steps to enter. Pt has a walker, cane, rollator, wheelchair, shower chair, and bed side commode. Dr. Jf Saldivar is PCP and facundo in [place] is pharmacy. Awaiting therapy evaluations. Discussed probable ASHLYN due to her pain, pt is in agreement. Pt has been to [facility] in past and would like to return. Referral to [facility]), no precert needed, COVID- on 2/5, no symptoms, will not need another COVID unless symptoms evolve. Envelope, ambulance form, and completed HENS in soft chart. Judyth Boast LSW

## 2021-02-09 NOTE — PROGRESS NOTES
Hospitalist Progress Note      SYNOPSIS: Patient admitted on 2021 presented to [facility] with past medical history of anemia, hypertension, osteoarthritis, coronary artery disease status post CABG, spinal stenosis, chronic back pain in pain management, she has spinal stimulator in the left of the abdomen, valvular heart disease-aortic stenosis status post bioprosthetic aortic valve replacement, lymphedema, obesity, ambulatory dysfunction, uses assistive device at home and CKD. Patient has been having pain in the left hip and lower back for about 6 days, pain is dull, constant, moderate in intensity, worse with movement and associated with worsening ambulatory dysfunction.        Vital signs notable for blood pressure 119/90, labs showed negative SARS-CoV-2, glucose 111, white count 14.8, hemoglobin 9.7, this was recently 10.5, potassium 3.4, creatinine 1.5, usual baseline 1.0, proBNP 3919, INR 1.3 and negative troponin. Urinalysis with greater than 20 white cells, 5-10 red cells with many bacteria and leukocyte esterase. Chest x-ray shows stable pleural thickening, x-ray of the left hip and pelvis showed healed pelvic rami fracture on the right, CT scan of the abdomen and pelvis shows thickening of the gastric antrum and proximal duodenum with surrounding fluid, ? Infectious/inflammatory process versus peptic ulcer disease, peripancreatic edema, trace pleural effusion and small ascites.      SUBJECTIVE:    Acute events overnight  Records reviewed      Temp (24hrs), Av.9 °F (36.6 °C), Min:96.1 °F (35.6 °C), Max:98.9 °F (37.2 °C)    DIET: DIET PEPTIC ULCER;  CODE: Full Code    Intake/Output Summary (Last 24 hours) at 2021 1047  Last data filed at 2021 7675  Gross per 24 hour   Intake 425 ml   Output 1247 ml   Net -822 ml         OBJECTIVE:    BP (!) 117/55   Pulse 76   Temp 98.9 °F (37.2 °C) (Temporal)   Resp 19   Ht 5' 4\" (1.626 m)   Wt 189 lb (85.7 kg)   SpO2 94%   BMI 32.44 kg/m² General appearance:  awake, somnolent and oriented to person, place, not time,  appears stated age and cooperative; no apparent distress no labored breathing  HEENT:  Conjunctivae/corneas clear. Neck: Supple. No jugular venous distention. Respiratory: symmetrical; clear to auscultation bilaterally; no wheezes; no rhonchi; no rales  Cardiovascular: rhythm regular; rate controlled; 2/6 systolic murmur at the base reviewed  Abdomen: Soft, nontender, nondistended  Musculoskeletal: No clubbing, cyanosis, no bilateral lower extremity edema. Brisk capillary refill. Skin:  No rashes  on visible skin  Neurologic: awake, alert and following commands     ASSESSMENT and PLAN:  · Gastroduodenitis-  PPI. General surgery following. · UTI- IV Rocephin. Urine culture. Blood culture. Procalcitonin 0.71. · MEGHAN on CKD- patient has signs of volume overload and needs to be diuresed. Gentle diuresis, monitor I's and O's and daily weights. · Acute on chronic diastolic CHF- patient with volume overload, gentle diuresis with Bumex. Last echo was in 2014, EF 70 to 75% with grade 1 diastolic dysfunction. Echocardiogram pending  · Acute on chronic back pain-  L2 fracture on CT scan, obtain MRI of the lumbar spine to evaluate. Pain control. · Left hip pain- no acute findings on x-ray. Clinical presentation worrisome for septic hip. Ortho evaluation. · Hypokalemia- replace and monitor  · Bilateral lower extremity edema with pain- Doppler ultrasound to rule out DVT neg. ?  Cellulitis. Rocephin should cover.   Free T4 decreased, start synthroid  · Coronary artery disease-   · Hyperlipidemia- statin  · Debility with ambulatory dysfunction- physical therapy as tolerated when appropriate  · Anemia- monitor hemoglobin  · Hypertension-       Patient has a pain pump        Medications:  REVIEWED DAILY    Infusion Medications    lactated ringers 75 mL/hr at 02/09/21 0568     Scheduled Medications  sucralfate  1 g Oral 4 times per day    heparin (porcine)  5,000 Units Subcutaneous Q8H    levothyroxine  50 mcg Oral Daily    pantoprazole  40 mg Intravenous BID    carvedilol  12.5 mg Oral QAM    carvedilol  6.25 mg Oral Nightly    vitamin D  2,000 Units Oral Daily    ferrous sulfate  325 mg Oral BID    lidocaine  1 patch Transdermal Daily    therapeutic multivitamin-minerals  1 tablet Oral Daily    rosuvastatin  20 mg Oral Daily    sodium chloride flush  10 mL Intravenous 2 times per day    cefTRIAXone (ROCEPHIN) IV  1,000 mg Intravenous Q24H    bumetanide  1 mg Intravenous BID     PRN Meds: acetaminophen, sodium chloride flush, promethazine **OR** ondansetron, polyethylene glycol, HYDROcodone 5 mg - acetaminophen, [Held by provider] morphine    Labs:     Recent Labs     02/07/21  0649 02/08/21  0558 02/09/21  0629   WBC 12.4* 11.7* 12.0*   HGB 9.0* 8.9* 8.9*   HCT 28.1* 27.6* 27.2*    184 212       Recent Labs     02/07/21  0649 02/08/21  0558 02/09/21  0629    137 134   K 3.4* 3.6 3.3*    102 99   CO2 23 27 25   BUN 30* 28* 25*   CREATININE 1.4* 1.4* 1.3*   CALCIUM 8.2* 7.9* 8.2*       Recent Labs     02/07/21  0649 02/08/21  0558 02/09/21  0629   PROT 4.3* 4.5* 4.6*   ALKPHOS 112* 104 98   ALT 9 11 14   AST 14 19 22   BILITOT <0.2 <0.2 <0.2       No results for input(s): INR in the last 72 hours. No results for input(s): Michael Lei in the last 72 hours.     Chronic labs:    Lab Results   Component Value Date    TSH 2.620 02/05/2021    INR 1.3 02/05/2021       Radiology: REVIEWED DAILY    +++++++++++++++++++++++++++++++++++++++++++++++++  Lisa Pate MD  [facility], [place]  +++++++++++++++++++++++++++++++++++++++++++++++++ NOTE: This report was transcribed using voice recognition software. Every effort was made to ensure accuracy; however, inadvertent computerized transcription errors may be present.

## 2021-02-09 NOTE — PROGRESS NOTES
Occupational Therapy  OCCUPATIONAL THERAPY INITIAL EVALUATION      Date:2021  Patient Name: Shayla Betancourt  MRN: [id number]  : 1940  Room: [address] [place] [address]    Evaluating OT: Gladys Wilcox OTR/L #[id number]    Referring physician: Sebas Grant DO    AM-PAC Daily Activity Raw Score:   Recommended Adaptive Equipment: TBD     Reason for Admission/Diagnosis: MEGHAN, L hip pain, duodenal bulb ulcers with biopsy, proximal gastritis with biops s/p L hip aspiration, s/p EGD biopsy (21)  Pertinent Medical History/Surgery: anemia, CAD, chronic back pain, compression fx of lumbar spine, HTN, HLD, OA, s/p AVR, valvular heart disease, appendectomy, R lymphadenectomy, s/p CABG, spinal stenosis     Precautions:  Falls, tele, alarm, L LE pain ( WBAT L LE per ortho), TAPS     Home Living: Pt lives alone in a one story house with 5 steps to enter with railing on one side. Bedroom and bathroom are located on the first floor. Laundry is located first floor. Bathroom setup: Tub.shower with tub transfer bench, standard commode   Equipment owned: tub transfer bench, rollator  Prior Level of Function:   I/mod I with ADLs with occasional assist from family as needed,  assist with IADLs. Patient reports she completes light cleaning and meal prep. Granddaughter also assists as needed. Patient was using rollator for functional mobility. Driving: no, family provides transportation                             Occupation: not stated  Leisure:Reading, word seaches    Pain Level:  Moderate/ maximum pain in L hip with bed mobility  Cognition: A&O: self:yes, place:yes, time: yes, situation:yes  Communication: WFL  Follows 1-2 step directions   Memory:  good    Sequencing:  fair    Problem solving:  fair    Judgement/safety:  fair      Functional Assessment:   Initial Eval Status  Date: 21 Treatment Status  Date: Short Term Goals=LTG  Treatment frequency: PRN 1-4 x/week   Feeding Set-up/Stand by Assist   Modified [place] Grooming Minimal Assist   Completed grooming tasks while seated edge of bed  Modified [place]     UB Dressing Minimal Assist   Modified [place]    LB Dressing Dependent   Total A to doff/ don socks for safety  Moderate Assist    Bathing Maximal Assist   Moderate Assist    Toileting Dependent   Maximal Assist    Bed Mobility  B rolling: Max A  Supine to sit:  Max A   Sit to supine: Max A  Supine to sit: Min A   Sit to supine: Min A   Functional Transfers Sit to stand: NT  Stand to sit: NT  Stand pivot: NT  Patient declined due to pain  Min A using fww   Functional Mobility NT  Patient declined due to pain  Min A using fww  Short household distance   Balance Sitting:     Static:SBA    Dynamic:NT  Standing: NT     Activity Tolerance Fair-  Fair+   Visual/  Perceptual Glasses: no         Safety       Fair                  Good     Upper Extremities:   Hand Dominance: Right [x]  Left []      ROM and Strength Coordination Short Term Goals=LTG   Right UE Active ROM:   Shoulder: ~0-45 degrees flexion/extension, reports limited ROM at baseline  Elbow-hand: WFL           Strength: 4-/5 Fine/gross Motor Coordination: Impaired, assistance required with ADLs fmc tasks  Opposition:impaired           Left UE Active ROM:   Shoulder: ~0-75 degrees flexion/extension, reports limited ROM at baseline  Elbow-hand: WFL           Strength: 4-/5 Fine/gross Motor Coordination: Impaired, assistance required with ADLs fmc tasks  Opposition:impaired         Hearing: WFL  Sensation:  No c/o numbness or tingling  Tone:  WFL  Edema: none observed B UE Comments: Upon arrival, patient semi-supine in bed and agreeable to session with encouragement. OT evaluation performed with education provided regarding the purpose and benefits of OT session, along with mobility and I/ADL completion. Patient demonstrates functional limitations with ADLs/functional mobility due to pain, weakness, and decreased activity tolerance. At end of session, patient semi-supine in bed with call light and phone within reach, along with all lines and tubes intact. Alarm on. Treatment: OT treatment provided this date includes:  ? ADL training-  Instruction/training on safety and adapted techniques for completion of ADLs: Patient completed grooming tasks while seated edge of bed for pain management and safety. SBA overall for balance. Min A to complete oral care to brush teeth using R UE with assistance with rinsing, and set-up required to open/close toothpaste cap. Set-up/SBA to wash face and hands. ?  Mobility training-  Instruction/training on safety and improved independence. Max A B rolling with verbal cues for technique. Max A supine<>sit with verbal/tactile cues for sequencing, hand placement, and technique. Increased time and effort required. ?  Skilled positioning/alignment-  Proper Positioning/Alignment: Patient re-positioned utilizing TAPS with max A for rolling for re-positioning. Completed for joint/skin protection  ? Skilled monitoring of vitals-  Skilled monitoring of the patient's response throughout treatment. Patient would  benefit from continued skilled OT  to maximize functional outcomes as they relate to I/ADLs and functional mobility. Eval Complexity: Mod  · Evaluation Complexity: Mod Complexity  ? History: Expanded review of medical records and additional review of physical, cognitive, or psychosocial history related to current functional performance  ?  Exam: 5+ performance deficits ? Assistance/Modification: mod/max assistance or modifications required to perform tasks. May have comorbidities that affect occupational performance.       Assessment of current deficits   Functional mobility [x]  ADLs [x] Strength [x]  Cognition []  Functional transfers  [x] IADLs [x] Safety Awareness [x]  Endurance/Activity tolerance [x]  Fine Motor Coordination [x] Balance [x] Vision/perception [] Sensation []   Gross Motor Coordination [x] ROM [x] Delirium []                  Motor Control []    Plan of Care:  OT 1-4x/week for 1-2 weeks PRN   [x] ADL retraining/modified techniques, along with assistive device recommendations to maximize patient safety and performance with self-care while maintaining precautions   [x]Balance retraining/tolerance tasks for facilitation of postural control with dynamic challenges during ADLs and functional activities   [x] Delirium Prevention/treatment to maximize functional outcomes   [] Cognitive Re-Training/ executive function skills for safe participation in ADLs/IADLs, along with functional activities   [x] Energy conservation techniques/Strategies to improve activity tolerance      [x] Environmental modifications for safe mobility and completion of ADLs    [x] Functional mobility training/DME recommendations for increased performance, safety and fall prevention  while maintaining precautions     [x] Functional transfer/mobility training/DME recommendations for increased performance, safety and fall prevention while maintaining precautions           []Neuromuscular re-education: facilitation of righting/equilibrium reactions, midline orientation, scapular stability/mobility, normalization muscle tone and facilitation active functional movement/Attention   [x] Patient/Family education to increase safety and functional independence   [x] Positioning to improve functional independence, safety, and skin integrity

## 2021-02-10 VITALS
BODY MASS INDEX: 32.11 KG/M2 | HEART RATE: 83 BPM | OXYGEN SATURATION: 93 % | RESPIRATION RATE: 20 BRPM | WEIGHT: 188.06 LBS | SYSTOLIC BLOOD PRESSURE: 141 MMHG | HEIGHT: 64 IN | DIASTOLIC BLOOD PRESSURE: 65 MMHG | TEMPERATURE: 99 F

## 2021-02-10 LAB
ALBUMIN SERPL-MCNC: 1.9 G/DL (ref 3.5–5.2)
ALP BLD-CCNC: 91 U/L (ref 35–104)
ALT SERPL-CCNC: 15 U/L (ref 0–32)
ANION GAP SERPL CALCULATED.3IONS-SCNC: 10 MMOL/L (ref 7–16)
AST SERPL-CCNC: 24 U/L (ref 0–31)
BILIRUB SERPL-MCNC: <0.2 MG/DL (ref 0–1.2)
BUN BLDV-MCNC: 23 MG/DL (ref 8–23)
CALCIUM SERPL-MCNC: 8.1 MG/DL (ref 8.6–10.2)
CHLORIDE BLD-SCNC: 99 MMOL/L (ref 98–107)
CO2: 25 MMOL/L (ref 22–29)
CREAT SERPL-MCNC: 1.2 MG/DL (ref 0.5–1)
GFR AFRICAN AMERICAN: 52
GFR NON-AFRICAN AMERICAN: 43 ML/MIN/1.73
GLUCOSE BLD-MCNC: 100 MG/DL (ref 74–99)
HCT VFR BLD CALC: 28.5 % (ref 34–48)
HEMOGLOBIN: 9.3 G/DL (ref 11.5–15.5)
MCH RBC QN AUTO: 27.9 PG (ref 26–35)
MCHC RBC AUTO-ENTMCNC: 32.6 % (ref 32–34.5)
MCV RBC AUTO: 85.6 FL (ref 80–99.9)
PDW BLD-RTO: 16.3 FL (ref 11.5–15)
PLATELET # BLD: 214 E9/L (ref 130–450)
PMV BLD AUTO: 11 FL (ref 7–12)
POTASSIUM SERPL-SCNC: 3.4 MMOL/L (ref 3.5–5)
RBC # BLD: 3.33 E12/L (ref 3.5–5.5)
SODIUM BLD-SCNC: 134 MMOL/L (ref 132–146)
TOTAL PROTEIN: 4.7 G/DL (ref 6.4–8.3)
WBC # BLD: 12.1 E9/L (ref 4.5–11.5)

## 2021-02-10 PROCEDURE — 36415 COLL VENOUS BLD VENIPUNCTURE: CPT

## 2021-02-10 PROCEDURE — 2500000003 HC RX 250 WO HCPCS: Performed by: SURGERY

## 2021-02-10 PROCEDURE — 6370000000 HC RX 637 (ALT 250 FOR IP): Performed by: SURGERY

## 2021-02-10 PROCEDURE — 6360000002 HC RX W HCPCS: Performed by: SURGERY

## 2021-02-10 PROCEDURE — 80053 COMPREHEN METABOLIC PANEL: CPT

## 2021-02-10 PROCEDURE — 85027 COMPLETE CBC AUTOMATED: CPT

## 2021-02-10 PROCEDURE — 2580000003 HC RX 258: Performed by: STUDENT IN AN ORGANIZED HEALTH CARE EDUCATION/TRAINING PROGRAM

## 2021-02-10 PROCEDURE — 2580000003 HC RX 258: Performed by: SURGERY

## 2021-02-10 RX ORDER — SUCRALFATE 1 G/1
1 TABLET ORAL 4 TIMES DAILY
Qty: 120 TABLET | Refills: 3 | Status: SHIPPED | OUTPATIENT
Start: 2021-02-10 | End: 2021-01-01

## 2021-02-10 RX ORDER — SULFAMETHOXAZOLE AND TRIMETHOPRIM 800; 160 MG/1; MG/1
1 TABLET ORAL EVERY 12 HOURS SCHEDULED
Qty: 4 TABLET | Refills: 0 | Status: SHIPPED | OUTPATIENT
Start: 2021-02-10 | End: 2021-02-12

## 2021-02-10 RX ORDER — LEVOTHYROXINE SODIUM 0.05 MG/1
50 TABLET ORAL DAILY
Qty: 30 TABLET | Refills: 3 | Status: SHIPPED | OUTPATIENT
Start: 2021-02-11

## 2021-02-10 RX ORDER — FUROSEMIDE 40 MG/1
40 TABLET ORAL DAILY
Status: DISCONTINUED | OUTPATIENT
Start: 2021-02-11 | End: 2021-02-10 | Stop reason: HOSPADM

## 2021-02-10 RX ORDER — SULFAMETHOXAZOLE AND TRIMETHOPRIM 800; 160 MG/1; MG/1
1 TABLET ORAL EVERY 12 HOURS SCHEDULED
Status: DISCONTINUED | OUTPATIENT
Start: 2021-02-10 | End: 2021-02-10 | Stop reason: HOSPADM

## 2021-02-10 RX ORDER — PANTOPRAZOLE SODIUM 40 MG/1
40 TABLET, DELAYED RELEASE ORAL
Qty: 30 TABLET | Refills: 3 | Status: SHIPPED | OUTPATIENT
Start: 2021-02-10

## 2021-02-10 RX ADMIN — HYDROCODONE BITARTRATE AND ACETAMINOPHEN 1 TABLET: 5; 325 TABLET ORAL at 10:06

## 2021-02-10 RX ADMIN — PANTOPRAZOLE SODIUM 40 MG: 40 TABLET, DELAYED RELEASE ORAL at 06:37

## 2021-02-10 RX ADMIN — FERROUS SULFATE TAB 325 MG (65 MG ELEMENTAL FE) 325 MG: 325 (65 FE) TAB at 09:53

## 2021-02-10 RX ADMIN — LEVOTHYROXINE SODIUM 50 MCG: 0.05 TABLET ORAL at 06:37

## 2021-02-10 RX ADMIN — ONDANSETRON 4 MG: 2 INJECTION INTRAMUSCULAR; INTRAVENOUS at 12:14

## 2021-02-10 RX ADMIN — PROMETHAZINE HYDROCHLORIDE 12.5 MG: 25 TABLET ORAL at 03:29

## 2021-02-10 RX ADMIN — CEFTRIAXONE SODIUM 1000 MG: 1 INJECTION, POWDER, FOR SOLUTION INTRAMUSCULAR; INTRAVENOUS at 03:18

## 2021-02-10 RX ADMIN — ROSUVASTATIN 20 MG: 20 TABLET, FILM COATED ORAL at 09:53

## 2021-02-10 RX ADMIN — SUCRALFATE 1 G: 1 TABLET ORAL at 05:56

## 2021-02-10 RX ADMIN — HEPARIN SODIUM 5000 UNITS: 10000 INJECTION INTRAVENOUS; SUBCUTANEOUS at 10:15

## 2021-02-10 RX ADMIN — CARVEDILOL 12.5 MG: 6.25 TABLET, FILM COATED ORAL at 09:52

## 2021-02-10 RX ADMIN — SODIUM CHLORIDE, POTASSIUM CHLORIDE, SODIUM LACTATE AND CALCIUM CHLORIDE: 600; 310; 30; 20 INJECTION, SOLUTION INTRAVENOUS at 05:56

## 2021-02-10 RX ADMIN — BUMETANIDE 1 MG: 0.25 INJECTION INTRAMUSCULAR; INTRAVENOUS at 09:54

## 2021-02-10 RX ADMIN — SODIUM CHLORIDE, PRESERVATIVE FREE 10 ML: 5 INJECTION INTRAVENOUS at 09:51

## 2021-02-10 RX ADMIN — Medication 2000 UNITS: at 09:52

## 2021-02-10 RX ADMIN — Medication 1 TABLET: at 09:52

## 2021-02-10 ASSESSMENT — PAIN SCALES - GENERAL
PAINLEVEL_OUTOF10: 4
PAINLEVEL_OUTOF10: 0

## 2021-02-10 NOTE — DISCHARGE INSTR - COC
Continuity of Care Form    Patient Name: Marilou Argueta   :  1940  MRN:  [id number]    Admit date:  2021  Discharge date:  ***    Code Status Order: Full Code   Advance Directives:   Advance Care Flowsheet Documentation     Date/Time Healthcare Directive Type of Healthcare Directive Copy in [address] Agent's Name Healthcare Agent's Phone Number    [phone]  Yes, patient has an advance directive for healthcare treatment  Durable power of  for health care   [place]          Admitting Physician:  Jo Loyd MD  PCP: Oscar Arreola DO    Discharging Nurse: [facility] Unit/Room#: 6010/1927-X  Discharging Unit Phone Number: ***    Emergency Contact:   Extended Emergency Contact Information  Primary Emergency Contact: Girish Bhakta   [address] Phone: [phone]  Mobile Phone: [phone]  Relation: Child    Past Surgical History:  Past Surgical History:   Procedure Laterality Date    APPENDECTOMY      CARDIAC SURGERY      CHOLECYSTECTOMY      CORONARY ARTERY BYPASS GRAFT      ECHO COMPL W DOP COLOR FLOW  2012         LYMPHADENECTOMY Right     upper rt leg lymph node removed over 20 years ago    PAIN MANAGEMENT PROCEDURE      MORPHINE PAIN PUMP    SKIN BIOPSY      TONSILLECTOMY      UPPER GASTROINTESTINAL ENDOSCOPY N/A 2021    EGD BIOPSY performed by Angelia Elizabeth MD at [address] [place] [address]       Immunization History:   Immunization History   Administered Date(s) Administered    Influenza Virus Vaccine 2012    Pneumococcal Polysaccharide ([id number]) 2012       Active Problems:  Patient Active Problem List   Diagnosis Code    Fall due to stumbling W01. 0XXA    Anemia D64.9    Essential hypertension I10    OA (osteoarthritis) M19.90    Spinal stenosis M48.00    Osteoporosis M81.0    Chronic pain syndrome G89.4    Facial contusion S00.83XA    Compression fx, lumbar spine (HCC) S32.000A  Compression fx, thoracic spine ([facility]) S22.000A    Valvular heart disease I38    Tachycardia R00.0    Closed fracture of nasal bone S02. 2XXA    Acute kidney injury ([facility] [address] N17.9    Closed fracture of sacrum, initial encounter ([facility] [address] S32.10XA    DDD (degenerative disc disease), lumbar M51.36    Hypoalbuminemia E88.09    Ambulatory dysfunction R26.2    Debility R53.81    CKD (chronic kidney disease) N18.9    History of aortic valve replacement Z95.2    Multiple fracture T07. [place] UTI (urinary tract infection) N39.0    Acute kidney injury superimposed on chronic kidney disease (HCC) N17.9, N18.9    Gastroduodenitis K29.90    Hypokalemia E87.6    Volume overload E87.70    Hip pain, left M25.552    Low back pain M54.5    CAD (coronary artery disease) I25.10    HLD (hyperlipidemia) E78.5    Closed fracture of second lumbar vertebra ([facility]) S32.029A    Edema R60.9    Epigastric pain R10.13       Isolation/Infection:   Isolation          No Isolation        Patient Infection Status     Infection Onset Added Last Indicated Last Indicated By Review Planned Expiration Resolved Resolved By    None active    Resolved    COVID-19 Rule Out 02/05/21 02/05/21 02/05/21 COVID-19 (Ordered)   02/05/21 Rule-Out Test Resulted          Nurse Assessment:  Last Vital Signs: BP (!) 141/65   Pulse 83   Temp 99 °F (37.2 °C) (Temporal)   Resp 20   Ht 5' 4\" (1.626 m)   Wt 188 lb 1 oz (85.3 kg)   SpO2 93%   BMI 32.28 kg/m²     Last documented pain score (0-10 scale): Pain Level: 4  Last Weight:   Wt Readings from Last 1 Encounters:   02/10/21 188 lb 1 oz (85.3 kg)     Mental Status:  oriented and alert    IV Access:  - None    Nursing Mobility/ADLs:  Walking   Dependent  Transfer  Dependent  Bathing  Dependent  Dressing  Dependent  Toileting  Dependent  Feeding  Assisted  Med Admin  Assisted  Med Delivery   whole    Wound Care Documentation and Therapy:        Elimination:  Continence:   · Bowel: No  · Bladder: No Urinary Catheter: {Urinary Catheter:336662074}   Colostomy/Ileostomy/Ileal Conduit: {YES / RV:65436}       Date of Last BM: ***    Intake/Output Summary (Last 24 hours) at 2/10/2021 1059  Last data filed at 2/10/2021 0737  Gross per 24 hour   Intake 2132.87 ml   Output 3850 ml   Net -1717.13 ml     I/O last 3 completed shifts: In: 2152.9 [P.O.:400; I.V.:1752.9]  Out: 3850 [Urine:3850]    Safety Concerns: At Risk for Falls    Impairments/Disabilities:      Vision    Nutrition Therapy:  Current Nutrition Therapy:   - Oral Diet:  General and peptic    Routes of Feeding: Oral  Liquids: Thin Liquids  Daily Fluid Restriction: no  Last Modified Barium Swallow with Video (Video Swallowing Test): not done    Treatments at the Time of Hospital Discharge:   Respiratory Treatments: ***  Oxygen Therapy:  is not on home oxygen therapy.   Ventilator:    - No ventilator support    Rehab Therapies: {THERAPEUTIC INTERVENTION:6936780601}  Weight Bearing Status/Restrictions: No weight bearing restirctions  Other Medical Equipment (for information only, NOT a DME order):  {EQUIPMENT:169333930}  Other Treatments: ***    Patient's personal belongings (please select all that are sent with patient):  None    RN SIGNATURE:  Electronically signed by Allen Najera RN on 2/10/21 at 11:09 AM EST    CASE MANAGEMENT/SOCIAL WORK SECTION    Inpatient Status Date: ***    Readmission Risk Assessment Score:  Readmission Risk              Risk of Unplanned Readmission:        25           Discharging to Facility/ Agency   · Name:   · Address:  · Phone:  · Fax:    Dialysis Facility (if applicable)   · Name:  · Address:  · Dialysis Schedule:  · Phone:  · Fax:    / signature: {Esignature:449755438}    PHYSICIAN SECTION    Prognosis: {Prognosis:2382217404}    Condition at Discharge: [address] Patient Condition:824423474}    Rehab Potential (if transferring to Rehab): {Prognosis:1557858946} Recommended Labs or Other Treatments After Discharge: ***    Physician Certification: I certify the above information and transfer of Lucina Camacho  is necessary for the continuing treatment of the diagnosis listed and that she requires {Admit to Appropriate Level of Care:71490} for {GREATER/LESS:817120076} 30 days.      Update Admission H&P: {CHP DME Changes in NGMWB:640674737}    PHYSICIAN SIGNATURE:  {Esignature:798018237}

## 2021-02-10 NOTE — CARE COORDINATION
Pt discharging today, Lexi([facility]) arranged transport for 2pm . Notified pt and family at bedside of discharge/time. Notified charge RN and RN of discharge/time. Envelope and HENS in soft chart. Bonifacio AVALOS

## 2021-02-11 LAB
BLOOD CULTURE, ROUTINE: NORMAL
CULTURE, BLOOD 2: NORMAL

## 2021-02-11 NOTE — DISCHARGE SUMMARY
Hospitalist Discharge Summary    Patient ID: Ronald Hung   Patient : 1940  Patient's PCP: Diamante Jorgensen DO    Admit Date: 2021   Admitting Physician: Jorge Bravo MD    Discharge Date:  2021   Discharge Physician: Rehana Sharp DO   Discharge Condition: Stable  Discharge Disposition: [address]  course in brief:  (Please refer to daily progress notes for a comprehensive review of the hospitalization by requesting medical records)    79 y.o. female who presented to [facility] with past medical history of anemia, hypertension, osteoarthritis, coronary artery disease status post CABG, spinal stenosis, chronic back pain in pain management, she has spinal stimulator in the left of the abdomen, valvular heart disease-aortic stenosis status post bioprosthetic aortic valve replacement, lymphedema, obesity, ambulatory dysfunction, uses assistive device at home and CKD. Patient has been having pain in the left hip and lower back for about 6 days, pain is dull, constant, moderate in intensity, worse with movement and associated with worsening ambulatory dysfunction. She denies any chest pain, shortness of breath, fever or cough. No abdominal pain, nausea, vomiting or change in bowel habits. No urinary complaints.   She has leg edema bilaterally.     Vital signs notable for blood pressure 119/90, labs showed negative SARS-CoV-2, glucose 111, white count 14.8, hemoglobin 9.7, this was recently 10.5, potassium 3.4, creatinine 1.5, usual baseline 1.0, proBNP 3919, INR 1.3 and negative troponin. Urinalysis with greater than 20 white cells, 5-10 red cells with many bacteria and leukocyte esterase. Chest x-ray shows stable pleural thickening, x-ray of the left hip and pelvis showed healed pelvic rami fracture on the right, CT scan of the abdomen and pelvis shows thickening of the gastric antrum and proximal duodenum with surrounding fluid, ? Infectious/inflammatory process versus peptic ulcer disease, peripancreatic edema, trace pleural effusion and small ascites.      She is being admitted for further management.     Was seen by general surgery. They did not feel EGD was warranted. Started PPI. She was also seen by orthopedics with regards to her hip pain. IR aspiration of the joint did not yield any specimen for evaluation thus septic hip was less likely. She was seen by PT/OT. Pain pump was titrated. She was then discharged on February 10, 2021    Consults:   IP CONSULT TO INTERNAL MEDICINE  IP CONSULT TO GENERAL SURGERY  IP CONSULT TO ORTHOPEDIC SURGERY  IP CONSULT TO PALLIATIVE CARE  IP CONSULT TO PAIN MANAGEMENT    Discharge Diagnoses:  Gastroduodenitis  UTI  MEGHAN on CKD  Acute on chronic diastolic CHF  Acute on chronic back pain  Left hip pain      Discharge Instructions / Follow up:    No future appointments.     Continued appropriate risk factor modification of blood pressure, diabetes and serum lipids will remain essential to reducing risk of future atherosclerotic development    Activity: activity as tolerated    Significant labs:  CBC:   Recent Labs     02/09/21  0629 02/10/21  0617   WBC 12.0* 12.1*   RBC 3.25* 3.33*   HGB 8.9* 9.3*   HCT 27.2* 28.5*   MCV 83.7 85.6   RDW 16.1* 16.3*    214     BMP:   Recent Labs     02/09/21 0140 02/10/21  0617    134   K 3.3* 3.4*   CL 99 99   CO2 25 25   BUN 25* 23   CREATININE 1.3* 1.2*     LFT:  Recent Labs     02/09/21  0629 02/10/21  0617   PROT 4.6* 4.7*   ALKPHOS 98 91   ALT 14 15   AST 22 24   BILITOT <0.2 <0.2     PT/INR: No results for input(s): INR, APTT in the last 72 hours. BNP: No results for input(s): BNP in the last 72 hours.   Hgb A1C: No results found for: LABA1C  Folate and B12:   Lab Results   Component Value Date    OWHHJRCI08 >2000 (H) 12/29/2017   ,   Lab Results   Component Value Date    FOLATE 18.9 12/29/2017     Thyroid Studies:   Lab Results   Component Value Date    TSH 2.620 02/05/2021    O4XBULW 7.1 11/21/2012       Urinalysis:    Lab Results   Component Value Date    NITRU POSITIVE 02/05/2021    WBCUA >20 02/05/2021    BACTERIA MANY 02/05/2021    RBCUA 5-10 02/05/2021    RBCUA NONE 11/21/2012    BLOODU LARGE 02/05/2021    SPECGRAV 1.015 02/05/2021    GLUCOSEU Negative 02/05/2021       Imaging:  Echo Complete    Result Date: 2/9/2021 Transthoracic Echocardiography Report (TTE)  Demographics   Patient Name    Jacqueline Campos Gender            Female   Medical Record  [id number]    Room Number       1834  Number   Account #       [de-identified]   Procedure Date    02/09/2021   Corporate ID                Ordering                              Physician   Accession       [id number]  Referring  Number                      Physician   Date of Birth   1940  Sonographer       Cherelle Nieto [facility]   Age             79 year(s)  Interpreting      [address]                              Physician         Physician Cardiology                                                Myke Ramírez DO                               Any Other  Procedure Type of Study   TTE procedure:Echocardiogram W/Contrast.  Procedure Date Date: 02/09/2021 Start: 09:30 AM Study Location: Portable Technical Quality: Limited visualization due to patient immobility. Indications:Congestive heart failure. Patient Status: Routine Contrast Medium: Definity. Amount - 2 ml Contrast Comments: given by the rn. Height: 60 inches Weight: 194 pounds BSA: 1.84 m^2 BMI: 37.89 kg/m^2 Rhythm: Within normal limits HR: 90 bpm BP: 143/65 mmHg  Findings   Left Ventricle  Ejection fraction is visually estimated at 65%. No regional wall motion  abnormalities seen. Moderate left ventricular concentric hypertrophy  noted. Left ventricle size is normal. There is doppler evidence of stage  II diastolic dysfunction. Right Ventricle  Normal right ventricle structure and function. Left Atrium  Left atrial volume index of 29 ml per meters squared BSA. Right Atrium  Mildly enlarged right atrium size. Mitral Valve  Structurally normal mitral valve. No evidence of mitral valve stenosis. Physiologic and/or trace mitral regurgitation is present. Tricuspid Valve  The tricuspid valve appears structurally normal.  Physiologic and/or trace tricuspid regurgitation. Aortic Valve  Bioprosthetic valve in aortic position. The aortic valve area is 1.1 cm2  with a maximum gradient of 36 mmHg and a mean gradient of 20 mmHg. No  evidence of aortic valve regurgitation. Pulmonic Valve  Pulmonic valve is structurally normal. Physiologic and/or trace pulmonic  regurgitation present. Pericardial Effusion  No evidence for hemodynamically significant pericardial effusion. Aorta  Normal aortic root and ascending aorta. Miscellaneous  The inferior vena cava diameter is normal with normal respiratory  variation. Conclusions   Summary  Ejection fraction is visually estimated at 65%. No regional wall motion abnormalities seen. Moderate left ventricular concentric hypertrophy noted. There is doppler evidence of stage II diastolic dysfunction. Normal right ventricle structure and function. Bioprosthetic valve in aortic position. The aortic valve area is 1.1 cm2  with a maximum gradient of 36 mmHg and a mean gradient of 20 mmHg. No evidence of aortic valve regurgitation. Physiologic and/or trace mitral regurgitation is present. Physiologic and/or trace tricuspid regurgitation. Signature   ----------------------------------------------------------------  Electronically signed by Prachi Zaragoza DO(Interpreting  physician) on 02/09/2021 01:40 PM  ----------------------------------------------------------------  M-Mode/2D Measurements & Calculations   LV Diastolic     LV Systolic Dimension: 2.7 cm   AV Cusp Separation: 1.6  Dimension: 3.5   LV Volume Diastolic: 32.2 ml    cmAO Root Dimension: 2.7  cm               LV Volume Systolic: 54.9 ml     cm  LV FS:22.9 %     LV EDV/LV EDV Index: 51.8 JL/33  LV PW Diastolic: XZ/M^6DC ESV/LV ESV Index: 28.1  1.4 cm           ml/15ml/ m^2  LV PW Systolic:  EF Calculated: 45.8 %           RV Diastolic Dimension:  1.5 cm           LV Mass Index: 111 l/min*m^2    3. 1 cm  Septum           LV Length: 6.5 cm  Diastolic: 1.7                                   Ascending Aorta: 2.6 cm  cm               LVOT: 1.7 cm                    LA volume/Index: 52.7 ml  Septum Systolic:                                 /28.66ml/m^2  1. 7 cm                                           RA Area: 20.1 cm^2  CO: 4.84 l/min  CI: 2.63 l/m*m^2  LV Mass: 204.12  g  Doppler Measurements & Calculations   MV Peak E-Wave: 0.95 AV Peak Velocity:     LVOT Peak Velocity: 1.41 m/s  m/s                  3.01 m/s              LVOT Mean Velocity: 1.03 m/s  MV Peak A-Wave: 0.97 AV Peak Gradient:     LVOT Peak Gradient: 8 mmHgLVOT  m/s                  36.24 mmHg            Mean Gradient: 4.6 mmHg  MV E/A Ratio: 0.98   AV Mean Velocity:  MV Peak Gradient:    2.08 m/s  6.5 mmHg             AV Mean Gradient:  MV Mean Gradient: 3  19.5 mmHg             TR Velocity:2.62 m/s  mmHg                 AV VTI: 49.1 cm       TR Gradient:27.37 mmHg  MV Mean Velocity:    AV Area  0.8 m/s              (Continuity):1.1 cm^2  MV Deceleration  Time: 231.6 msec     LVOT VTI: 23.7 cm  MV P1/2t: 72.7 msec  MVA by PHT:3.03 cm^2  MV Area  (continuity): 1.6  cm^2  MV E' Septal  Velocity: 0.04 m/s  MV E' Lateral  Velocity: 5 m/s  http://Located within [email or web address]? DocKey=[id number]    Ct Abdomen Pelvis Wo Contrast Additional Contrast? None    Result Date: 2/5/2021 EXAMINATION: CT OF THE ABDOMEN AND PELVIS WITHOUT CONTRAST 2/5/2021 6:32 pm TECHNIQUE: CT of the abdomen and pelvis was performed without the administration of intravenous contrast. Multiplanar reformatted images are provided for review. Dose modulation, iterative reconstruction, and/or weight based adjustment of the mA/kV was utilized to reduce the radiation dose to as low as reasonably achievable. COMPARISON: For CT of the abdomen and pelvis, 11/21/2012 HISTORY: ORDERING SYSTEM PROVIDED HISTORY: LLQ pain and left hip pain TECHNOLOGIST PROVIDED HISTORY: Additional Contrast?->None Reason for exam:->LLQ pain and left hip pain What reading provider will be dictating this exam?->CRC FINDINGS: Lower Chest: Trace right and small left pleural effusions. The heart is normal in size. Minimal dependent atelectasis in the left lower lobe. Organs: Liver: Unremarkable. Gallbladder: Surgically absent. Pancreas: Prominently atrophied. Otherwise, unremarkable. Edema seen along the pancreatic head is likely related to duodenal inflammation. Spleen:  Unremarkable. Adrenals: Unremarkable. Kidneys: Unremarkable. GI/Bowel: Edema is seen along gastric antrum and the proximal duodenum indicating an infectious/inflammatory process. The remainder of the bowel is without wall thickening or obstruction. Mild distal colonic diverticulosis without diverticulitis. Pelvis: The urinary bladder is unremarkable. Within limits of the CT technique, the uterus is unremarkable for age. No gross adnexal abnormality seen. . Peritoneum/Retroperitoneum: No lymphadenopathy. No free air. Fluid surrounding the gastric antrum and duodenum. Small volume pelvic ascites. Prominent calcified atherosclerosis is seen in the aorta. No aneurysm. Bones/Soft Tissues: Mild chronic compression fracture deformity of the L2 vertebral body. Old, healed right superior and inferior pubic rami fracture. No acute fracture.   Severe disc degenerative changes in the EXAMINATION: ONE XRAY VIEW OF THE PELVIS AND TWO XRAY VIEWS LEFT HIP; ONE XRAY VIEW OF THE CHEST 2021 4:18 pm; 2021 4:19 pm COMPARISON: Pelvis 2020. Chest dated 2018 HISTORY: ORDERING SYSTEM PROVIDED HISTORY: left hip pain TECHNOLOGIST PROVIDED HISTORY: Reason for exam:->left hip pain What reading provider will be dictating this exam?->CRC; ORDERING SYSTEM PROVIDED HISTORY: Shortness of breath TECHNOLOGIST PROVIDED HISTORY: Reason for exam:->Shortness of breath What reading provider will be dictating this exam?->CRC FINDINGS: Pelvis and left hip: No definite acute fracture or dislocation. There are healed fractures of the pubic rami on the right. Normal soft tissues. The bones are demineralized. Chest: Stable probable pleural thickening on the left. Stable postoperative changes. The heart is borderline enlarged. The pulmonary vascularity is normal.  The lungs are clear. Chest: Stable probable pleural thickening on the left. Borderline cardiomegaly. Postoperative changes. Pelvis and left hip: No acute fracture. Healed pubic rami fractures on the right. Osteopenia. Us Dup Upper Extremity Left Venous    Result Date: 2021  Patient MRN:  [id number] : 1940 Age: 79 years Gender: Female Order Date:  2021 4:34 PM EXAM: US DUP UPPER EXTREMITY LEFT VENOUS NUMBER OF IMAGES:  36 INDICATION:  Significant swelling IV site Significant swelling IV site What reading provider will be dictating this exam?->[facility] COMPARISON: None Within the visualized vessels, there is no evidence for deep venous thrombosis There is good compressibility, there is good augmentation, there is good color flow.      Within the visualized vessels there is no evidence for deep venous thrombosis    Ct Hip Aspiration Left    Result Date: 2021 Patient MRN:  [id number] : 1940 Age: 79 years Gender: Female Order Date:  2021 11:02 AM EXAM: CT HIP ASPIRATION LEFT, CT GUIDED NEEDLE PLACEMENT NUMBER OF IMAGES:  44 INDICATION:  Left hip joint aspiration Please obtain labs including cell count, culture, gram stain, glucose, crystals. Thank you COMPARISON: None FINDINGS: After obtaining informed consent, following the routine sterile prep and drape and after the administration of local anesthesia a needle was inserted under fluoroscopic control , aspiration was performed at 4 separate points, no significant synovial fluid, or pus was identified, subsequently  contrast was injected confirming position into the left hip. Prior to the procedure a timeout occurred at  1102 hours. The patient received 102 seconds of fluoroscopy. The patient received 1 mg of Versed and 50 mcg of fentanyl. The patient was monitored for 30 minutes by a registered nurse. Successful uncomplicated epidural injection. Us Dup Lower Extremities Bilateral Venous    Result Date: 2021  Patient MRN:  [id number] : 1940 Age: 79 years Gender: Female Order Date:  2021 10:01 AM EXAM: US DUP LOWER EXTREMITIES BILATERAL VENOUS NUMBER OF IMAGES:  52 INDICATION:  DVT DVT What reading provider will be dictating this exam?->[facility] COMPARISON: None Within the visualized vessels, there is no evidence for deep venous thrombosis There is good compressibility, there is good augmentation, there is good color flow.      Within the visualized vessels there is no evidence for deep venous thrombosis    Xr Hip 2-3 Vw W Pelvis Left    Result Date: 2021 EXAMINATION: ONE XRAY VIEW OF THE PELVIS AND TWO XRAY VIEWS LEFT HIP; ONE XRAY VIEW OF THE CHEST 2/5/2021 4:18 pm; 2/5/2021 4:19 pm COMPARISON: Pelvis 18 February 2020. Chest dated 30 March 2018 HISTORY: ORDERING SYSTEM PROVIDED HISTORY: left hip pain TECHNOLOGIST PROVIDED HISTORY: Reason for exam:->left hip pain What reading provider will be dictating this exam?->CRC; ORDERING SYSTEM PROVIDED HISTORY: Shortness of breath TECHNOLOGIST PROVIDED HISTORY: Reason for exam:->Shortness of breath What reading provider will be dictating this exam?->CRC FINDINGS: Pelvis and left hip: No definite acute fracture or dislocation. There are healed fractures of the pubic rami on the right. Normal soft tissues. The bones are demineralized. Chest: Stable probable pleural thickening on the left. Stable postoperative changes. The heart is borderline enlarged. The pulmonary vascularity is normal.  The lungs are clear. Chest: Stable probable pleural thickening on the left. Borderline cardiomegaly. Postoperative changes. Pelvis and left hip: No acute fracture. Healed pubic rami fractures on the right. Osteopenia. Discharge Medications:      Medication List      START taking these medications    levothyroxine 50 MCG tablet  Commonly known as: SYNTHROID  Take 1 tablet by mouth Daily     pantoprazole 40 MG tablet  Commonly known as: PROTONIX  Take 1 tablet by mouth 2 times daily (before meals)     sucralfate 1 GM tablet  Commonly known as: CARAFATE  Take 1 tablet by mouth 4 times daily     sulfamethoxazole-trimethoprim 800-160 MG per tablet  Commonly known as:  Bactrim DS  Take 1 tablet by mouth every 12 hours for 2 days        CONTINUE taking these medications    acetaminophen 325 MG tablet  Commonly known as: TYLENOL     aspirin 81 MG tablet     Biotin 88980 MCG Tabs     Calcium + D 600-200 MG-UNIT Tabs  Generic drug: calcium carbonate-vitamin D     * carvedilol 6.25 MG tablet  Commonly known as: COREG * carvedilol 6.25 MG tablet  Commonly known as: COREG     clopidogrel 75 MG tablet  Commonly known as: PLAVIX     Crestor 20 MG tablet  Generic drug: rosuvastatin     ferrous sulfate 325 (65 Fe) MG tablet  Commonly known as: IRON 325     furosemide 40 MG tablet  Commonly known as: LASIX  Take 1 tablet by mouth daily     lidocaine 4 % external patch  Place 1 patch onto the skin daily     magnesium 30 MG tablet     mineral oil-hydrophilic petrolatum ointment  Apply topically as needed. nystatin 415362 UNIT/GM ointment  Commonly known as: MYCOSTATIN  Apply topically 2 times daily. nystatin-triamcinolone 567264-8.7 UNIT/GM-% cream  Commonly known as: MYCOLOG II  Apply topically 4 times daily. therapeutic multivitamin-minerals tablet     vitamin D 50 MCG (2000 UT) Caps capsule         * This list has 2 medication(s) that are the same as other medications prescribed for you. Read the directions carefully, and ask your doctor or other care provider to review them with you. Where to Get Your Medications      These medications were sent to [facility], [address] [facility] [phone]  [address] [place] [id number]    Phone: [phone]   · levothyroxine 50 MCG tablet  · pantoprazole 40 MG tablet  · sucralfate 1 GM tablet  · sulfamethoxazole-trimethoprim 800-160 MG per tablet         Time Spent on discharge is more than 45 minutes in the examination, evaluation, counseling and review of medications and discharge plan.    +++++++++++++++++++++++++++++++++++++++++++++++++  Lisa Pate, [address], [place]  +++++++++++++++++++++++++++++++++++++++++++++++++  NOTE: This report was transcribed using voice recognition software. Every effort was made to ensure accuracy; however, inadvertent computerized transcription errors may be present.

## 2021-02-12 ENCOUNTER — TELEPHONE (OUTPATIENT)
Dept: SURGERY | Age: 81
End: 2021-02-12

## 2021-02-12 NOTE — TELEPHONE ENCOUNTER
Received a call from [facility] the nurse from [facility] who wanting to make a follow up appt with Dr. Bozena Sandy. Spoke with Dr. Bozena Sandy. Per Dr. Bozena Sandy, as long as the patient is doing ok, she does not have to make a follow up appt with Dr. Bozena Sandy. she verbalized understanding.    Electronically signed by Tonia Pugh on 2/12/2021 at 2:13 PM

## 2021-02-17 LAB
SARS-COV-2: NOT DETECTED
SOURCE: NORMAL

## 2021-02-23 ENCOUNTER — HOSPITAL ENCOUNTER (EMERGENCY)
Age: 81
Discharge: HOME | End: 2021-02-24
Attending: EMERGENCY MEDICINE
Payer: MEDICARE

## 2021-02-23 DIAGNOSIS — Z71.1 FEARED COMPLAINT WITHOUT DIAGNOSIS: Primary | ICD-10-CM

## 2021-02-23 PROCEDURE — 99285 EMERGENCY DEPT VISIT HI MDM: CPT

## 2021-02-23 ASSESSMENT — ENCOUNTER SYMPTOMS
PHOTOPHOBIA: 0
NAUSEA: 0
VOMITING: 0
ABDOMINAL PAIN: 0
CHEST TIGHTNESS: 0
ABDOMINAL DISTENTION: 0
SHORTNESS OF BREATH: 0
COUGH: 0
DIARRHEA: 0

## 2021-02-24 VITALS
RESPIRATION RATE: 16 BRPM | BODY MASS INDEX: 32.1 KG/M2 | OXYGEN SATURATION: 95 % | DIASTOLIC BLOOD PRESSURE: 80 MMHG | SYSTOLIC BLOOD PRESSURE: 124 MMHG | TEMPERATURE: 97.5 F | HEIGHT: 64 IN | HEART RATE: 80 BPM | WEIGHT: 188 LBS

## 2021-02-24 NOTE — ED NOTES
Pt in from [facility] facility for decreased SPO2 to 89%. PT states \"it went down to 89% and came right back up\". Pt states she doesn't understand why she was sent to the ED and hat \"I feel fine\". Upon arrival to ED, pt's SPO2 on RA is 95%. Other vss. Pt has a h/o HTN, hypercholesterolemia, and CABG.  Pt has no complaints or concerns     Rudy Mckeon RN  02/23/21 [id number]

## 2021-02-27 LAB
SARS-COV-2: NOT DETECTED
SOURCE: NORMAL

## 2021-03-07 PROBLEM — N39.0 UTI (URINARY TRACT INFECTION): Status: RESOLVED | Noted: 2021-02-05 | Resolved: 2021-03-07

## 2021-08-04 PROBLEM — M25.559 HIP PAIN: Status: ACTIVE | Noted: 2021-01-01

## 2021-08-04 NOTE — LETTER
[address] Medicaid  CERTIFICATION OF NECESSITY  FOR TRANSPORTATION   BY WHEELCHAIR VAN     Individual Information   1. Name: Nat Amador 2. [place] Medicaid Billing Number: self    3. Address: [place] [address]      Transportation Provider Information   4. Provider Name: anuradha    5. [place] Medicaid Provider Number:  National Provider Identifier (NPI):      Certification  7. Criteria:  By signing this document, the practitioner certifies that two statements are true:  A. This individual must be accompanied by a mobility-related assistive device from the point of pick-up to the point of drop-off. B. Transport of this individual by standard passenger vehicle or common carrier is precluded or contraindicated. 8. Period Beginning Date: 2021   9. Length  [x] Not more than 2 day(s)  [] One Year     Additional Information Relevant to Certification   10. Comments or Explanations, If Necessary or Appropriate    left hip pain, compression fx of lumbar spine and thoracic vertebra      Certifying Practitioner Information   11. Name of Practitioner: deya angela md.    [address] Medicaid Provider Number, If Applicable:  Letty 62 Provider Identifier (NPI):      Signature Information   14. Date of Signature: 2021 15. Name of Person Signing: Randi Bolanos    12. Signature and Professional Designation: Electronically signed by BAILEY Skinner on 2021 at 11:27 AM       [facility] [address]  Rev. 2015      I Private Enc? No Unit RM BD: SEYZ 5S NS [id number]    Hospital Service: Med/Surg   Encounter DX: Ataxia [R27.0]   ADM Provider: Kristi Longo MD   Procedure:     ATT Provider: Haja Wing MD   REF Provider:        Admission DX: Ataxia, Hip pain, Left hip pain and DX codes: R27.0, M25.559, M25.552      PATIENT                 Name: Nat Amador : 1940 (80 yrs)   Address: [facility] Sex: Female   [place] city: [address]         Marital Status:     Employer: NONE         Yarsani: Branden   Primary Care Provider: Laura Cedillo DO         Primary Phone: [phone]   EMERGENCY CONTACT   Contact Name Legal Guardian? Relationship to Patient Home Phone Work Phone   1. ErikanathanGirish  2. *No Contact Specified*      Child    [phone]                 GUARANTOR            Guarantor: Sarmad Catherine     : 1940   Address: [address] Sex: Female     [place] [address]     Relation to Patient: Self       Home Phone: [phone]   Guarantor ID: [id number]       Work Phone:     Guarantor Employer: NONE         Status: RETIRED      COVERAGE        PRIMARY INSURANCE   Payor: MEDICARE Plan: MEDICARE PART A AND B   Payor Address: [facility] [address],  [place] [address], [facility] [address]       Group Number:   Insurance Type: INDEMNITY   Subscriber Name: Belkis Gómez : 1940   Subscriber ID: [id number] Soto Hernandez. Rel. to Sub: Self   SECONDARY INSURANCE   Payor: [employer] Plan: Aaron Lanier 61*   Payor Address:  [facility] [id number], [place] [id number]          Group Number:   Insurance Type: INDEMNITY   Subscriber Name: Belkis Dalia : 1940   Subscriber ID: [id number] Pat.  Rel. to Sub: SELF

## 2021-08-05 NOTE — PROGRESS NOTES
Physical Therapy  Physical Therapy Initial Assessment     Name: Raudel Angeles  : 1940  MRN: [id number]      Date of Service: 2021    Evaluating PT:  Sukhdeep Woody, PT [id number]      Room #:  9788/4981-A  Diagnosis:  Ataxia [R27.0]  Hip pain [M25.559]  Left hip pain [M25.552]  PMHx/PSHx:     has a past surgical history that includes ECHO Compl W Dop Color Flow (2012); Coronary artery bypass graft; Pain management procedure; Cholecystectomy; Appendectomy; Tonsillectomy; Cardiac surgery; skin biopsy; lymphadenectomy (Right); and Upper gastrointestinal endoscopy (N/A, 2021). has a past surgical history that includes ECHO Compl W Dop Color Flow (2012); Coronary artery bypass graft; Pain management procedure; Cholecystectomy; Appendectomy; Tonsillectomy; Cardiac surgery; skin biopsy; lymphadenectomy (Right); and Upper gastrointestinal endoscopy (N/A, 2021). Procedure/Surgery:  none  Precautions:  Falls, pain L hip,  , FWB (full weight bearing)  Equipment Needs: To be determined. SUBJECTIVE:    Patient lives alone  in a ranch home  with Ramp to enter. Bed is on 1 floor and bath is on 1 floor. Patient ambulated with rollator  PTA. Equipment owned: Wheelchair, Wheeled Walker and Rollator,  Uses w/c for community mobility. Uses lift for sleeping and to as assist sit to stand. OBJECTIVE:   Initial Evaluation  Date: 24 Treatment Short Term/ Long Term   Goals   AM-PAC 6 Clicks      Was pt agreeable to Eval/treatment? Yes      Does pt have pain? Pain L hip. Bed Mobility  Rolling: NT  Supine to sit: Mod x 2  Sit to supine: Nt  Scooting: Mod x 2  Rolling: Ind  Supine to sit: Ind  Sit to supine: Ind  Scooting: Ind   Transfers Sit to stand: Mod A x 2 with fww. Stand to sit: Min to chair. Stand pivot: Mod A x 2 very slow decreased Wb through L hip. Sit to stand: Min  Stand to sit: Min  Stand pivot: Min with fww.     Ambulation    5 feet with Mod A x 2 with fww.   25 feet with fww Min   Stair negotiation: ascended and descended  NT  Ramped entrance. ROM BUE:  Defer to OT eval  BLE:  Decreased L knee flexion     Strength BUE: Defer to OT eval  RLE:  4/5  LLE:   3+/5  4/5   Balance Sitting EOB:  Ind  Dynamic Standing: Mod A x 2 with fww. Sitting EOB:  Ind  Dynamic Standing:  Min     Patient is Alert & Oriented x person, place, time and situation and follows directions   Sensation:  Pt denies numbness and tingling to extremities  Edema:  none  Therapeutic Exercises:  Functional activity as stated above. Patient education  Pt educated on role of PT, need for safe mobility and sequencing for transfers. Patient response to education:   Pt verbalized understanding Pt demonstrated skill Pt requires further education in this area   yes yes Reminders     ASSESSMENT:    Conditions Requiring Skilled Therapeutic Intervention:    [x]Decreased strength     [x]Decreased ROM  [x]Decreased functional mobility  [x]Decreased balance   [x]Decreased endurance   [x]Decreased posture  []Decreased sensation  []Decreased coordination   []Decreased vision  [x]Decreased safety awareness   []Increased pain       Comments:      RN cleared patient for participation in therapy session. Patient was seen this date for PT evaluation. . Patient was agreeable to intervention. Results of the functional assessment are noted above. Upon entering the room patient was found supine in bed. Sat EOB x 10 minutes to increase dynamic sitting balance and activity tolerance. Transfers and gait completed with fww for safety. Increased cues required for sequencing. Gait completed in the room with chair follow. Cues for safety required. At end of session, patient in chair with  call light and phone within reach,  all lines and tubes intact, nursing notified. This patient can benefit from the continuation of skilled PT possibly in a rehab setting to maximize functional level and return to PLOF.      Treatment: Patient practiced and was instructed in the following treatment:    · Bed mobility training - pt given verbal and tactile cues to facilitate proper sequencing and safety during rolling and supine>sit as well as provided with physical assistance to complete task    · Assistive device training - pt educated on using [employer] during gait, [employer] approximation/negotiation, and hand placement during sit<>stand to [employer]  · STS and transfer training - educated on hand/foot placement, safety, and sequencing during STS and pivot transfers using assistive device  · Gait training - verbal cues for [employer] approximation/negotiation, upright posture, and safety during 90 and 180 degree turns during gait     Pt's/ family goals   1. Return to home when stronger. Prognosis is  good for reaching above PT goals. Patient and or family understand(s) diagnosis, prognosis, and plan of care. PHYSICAL THERAPY PLAN OF CARE:    PT POC is established based on physician order and patient diagnosis     Referring provider/PT Order:    PT eval and treat  Electronically Signed By  Sarah Fleming MD Date  Aug 5, 2021     Diagnosis:  Ataxia [R27.0]  Hip pain [M25.559]  Left hip pain [M25.552]  Specific instructions for next treatment:  Increase ambulation distance.      Current Treatment Recommendations:     [x] Strengthening to improve independence with functional mobility   [x] ROM to improve independence with functional mobility   [x] Balance Training to improve static/dynamic balance and to reduce fall risk  [x] Endurance Training to improve activity tolerance during functional mobility   [x] Transfer Training to improve safety and independence with all functional transfers   [x] Gait Training to improve gait mechanics, endurance and asses need for appropriate assistive device  [x] Stair Training in preparation for safe discharge home and/or into the community   [] Positioning to prevent skin breakdown and contractures  [x] Safety and Education Training   [] Patient/Caregiver Education   [] HEP  [] Other     PT long term treatment goals are located in above grid    Frequency of treatments: 2-5x/week x 1-2 weeks. Time in  1050  Time out  1135    Total Treatment Time  10 minutes     Evaluation Time includes thorough review of current medical information, gathering information on past medical history/social history and prior level of function, completion of standardized testing/informal observation of tasks, assessment of data and education on plan of care and goals.     CPT codes:  [x] Low Complexity PT evaluation 41842  [] Moderate Complexity PT evaluation 91896  [] High Complexity PT evaluation 82019  [] PT Re-evaluation 50431  [] Gait training 29847 - minutes  [] Manual therapy 73424 - minutes  [x] Therapeutic activities 28794 10 minutes  [] Therapeutic exercises 13897 - minutes  [] Neuromuscular reeducation 76824 - minutes     Isaiah Jimenez, [id number] [facility]

## 2021-08-05 NOTE — PROGRESS NOTES
Hospitalist Progress Note      SYNOPSIS: Patient admitted on 2021 for Left Hip Pain    SUBJECTIVE:    Patient seen and examined at bedside with complaints of persistent left hip pain. CT reviewed, no acute fractures. Pt also endorses her PCA pain pump may have malfunctioned. She otherwise denies any other complaints. Records reviewed. Stable overnight. No other overnight issues reported. Temp (24hrs), Av.3 °F (36.3 °C), Min:96.7 °F (35.9 °C), Max:98.3 °F (36.8 °C)    DIET: ADULT DIET; Regular  CODE: Full Code    Intake/Output Summary (Last 24 hours) at 2021 1151  Last data filed at 2021 1147  Gross per 24 hour   Intake 465 ml   Output 300 ml   Net 165 ml       OBJECTIVE:    BP (!) 124/59   Pulse 58   Temp 97 °F (36.1 °C) (Temporal)   Resp 12   Ht 5' (1.524 m)   Wt 173 lb 11.2 oz (78.8 kg)   SpO2 94%   BMI 33.92 kg/m²     General appearance: No apparent distress, appears stated age and cooperative. HEENT:  Conjunctivae/corneas clear. Neck: Supple. No jugular venous distention. Respiratory: Clear to auscultation bilaterally, normal respiratory effort  Cardiovascular: Regular rate rhythm, normal S1-S2  Abdomen: Soft, nontender, nondistended  Musculoskeletal: No clubbing, cyanosis, no bilateral lower extremity edema. Brisk capillary refill. Left hip pain on palpation and with minimal exertion.   Skin:  No rashes  on visible skin  Neurologic: awake, alert and following commands     ASSESSMENT and PLAN:     Left Hip Pain with Ambulatory dysfunction:  - CT reviewed no acute fractures  - Suspect MSK vs ligament injury?  - Obtain ortho consult  - PT/OT  - Pain control  - ESR and CRP mildly elevated; however, pt with positive UA.     CKD stage 3:  - Pts baseline since 2021 ~1.5  - CT incidentally with worsening atrophy of left kidney  - Cont on gentle IVF  - Obtain renal artery US to r/o stenosis given above atrophy and malignant HTN  - BP control  - Monitor renal function and avoid nephrotoxic meds when able     Urinary tract infection:  - UA moderate LE with bacteruria  - C/w empiric IV Rocephin; de-escalate as able    Hypertension:  - C/w Coreg, and lasix  - Start on lisinopril  - PRN Hydralazine      Chronic diastolic CHF:  - 2D echo reveals EF 65%  - C/w home Lasix as above     CAD:  - Cont home ASA, Plavix and statin      Normocytic anemia:  - Monitor  - Maintain Hgb>7      DVT Prophylaxis: Heparin   Diet: ADULT DIET; Regular  Code Status: Full Code    DISPOSITION: Med/Surg    Medications:  REVIEWED DAILY    Infusion Medications    sodium chloride 75 mL/hr at 08/05/21 0151    sodium chloride       Scheduled Medications    aspirin  81 mg Oral Daily    carvedilol  12.5 mg Oral QAM    carvedilol  6.25 mg Oral Nightly    clopidogrel  75 mg Oral Daily    ferrous sulfate  325 mg Oral BID    furosemide  40 mg Oral Daily    levothyroxine  50 mcg Oral Daily    therapeutic multivitamin-minerals  1 tablet Oral Daily    pantoprazole  40 mg Oral BID AC    rosuvastatin  20 mg Oral Every Other Day    sodium chloride flush  5-40 mL Intravenous 2 times per day    heparin (porcine)  5,000 Units Subcutaneous 3 times per day    cefTRIAXone (ROCEPHIN) IV  1,000 mg Intravenous Q24H     PRN Meds: sodium chloride flush, sodium chloride, ondansetron **OR** ondansetron, polyethylene glycol, acetaminophen **OR** acetaminophen, senna, traMADol, cyclobenzaprine    Labs:     Recent Labs     08/04/21  2235   WBC 9.2   HGB 10.5*   HCT 34.3          Recent Labs     08/04/21  2235      K 4.0      CO2 25   BUN 40*   CREATININE 1.5*   CALCIUM 9.4       Recent Labs     08/04/21  2235   PROT 6.2*   ALKPHOS 118*   ALT 10   AST 15   BILITOT 0.4       No results for input(s): INR in the last 72 hours. No results for input(s): [place] in the last 72 hours.     Chronic labs:    Lab Results   Component Value Date    CHOL 117 02/17/2021    TRIG 160 (H) 02/17/2021    HDL 26 02/17/2021 LDLCALC 59 02/17/2021    TSH 2.510 02/17/2021    INR 1.3 02/05/2021       Radiology: REVIEWED DAILY    +++++++++++++++++++++++++++++++++++++++++++++++++  MD SARAY Lane/ [address]  +++++++++++++++++++++++++++++++++++++++++++++++++  NOTE: This report was transcribed using voice recognition software. Every effort was made to ensure accuracy; however, inadvertent computerized transcription errors may be present.

## 2021-08-05 NOTE — PROGRESS NOTES
[address] [facility]                                                  Patient Name: Gonsalo Lang    MRN: [id number]    : 1940    Room: [address] [place] [address]      Evaluating OT: Naaman Bence. [place], OTR/L License #  [id number]       Referring Provider: Griselda Hidalgo MD    Specific Provider Orders/Date: OT evaluation & treatment       Diagnosis:  Ataxia  Past Surgical History:   Procedure Laterality Date    APPENDECTOMY      CARDIAC SURGERY      CHOLECYSTECTOMY      CORONARY ARTERY BYPASS GRAFT      ECHO COMPL W DOP COLOR FLOW  2012         LYMPHADENECTOMY Right     upper rt leg lymph node removed over 20 years ago   [facility] PAIN MANAGEMENT PROCEDURE      MORPHINE PAIN PUMP    SKIN BIOPSY      TONSILLECTOMY      UPPER GASTROINTESTINAL ENDOSCOPY N/A 2021    EGD BIOPSY performed by Jourdan Bustamante MD at [facility] ENDOSCOPY        Pertinent Medical History:  has a past medical history of Anemia, CAD (coronary artery disease), Chronic back pain, CKD (chronic kidney disease), Compression fracture of lumbar spine, non-traumatic (HCC), Compression fracture of thoracic vertebra ([address] History of blood transfusion, HTN (hypertension), Hyperlipidemia, OA (osteoarthritis), Osteoporosis, S/P AVR, and Valvular heart disease.          Precautions:  Fall Risk, pure wick, IV, L hip pain   Assessment of current deficits    [] Functional mobility  [x]ADLs  [x] Strength               [x]Cognition    [x] Functional transfers   [x] IADLs         [x] Safety Awareness   [x]Endurance    [x] Fine Coordination              [x] Balance      [] Vision/perception   [x]Sensation     []Gross Motor Coordination  [] ROM  [] Delirium                   [] Motor Control     OT PLAN OF CARE   OT POC based on physician orders, patient diagnosis and results of clinical assessment    Frequency/Duration: 2-4 days/wk for 2 weeks PRN   Specific OT Treatment Interventions to include: Instruction/training on adapted ADL techniques and AE recommendations to increase functional independence within precautions  Training on energy conservation strategies, correct breathing pattern and techniques to improve independence/tolerance for self-care routine  Functional transfer/mobility training/DME recommendations for increased independence, safety, and fall prevention  Patient/Family education to increase follow through with safety techniques and functional independence  Recommendation of environmental modifications for increased safety with functional transfers/mobility and ADLs  Therapeutic exercise to improve motor endurance, ROM, and functional strength for ADLs/functional transfers  Therapeutic activities to facilitate/challenge dynamic balance, stand tolerance for increased safety and independence with ADLs  Therapeutic activities to facilitate gross/fine motor skills for increased independence with ADLs  Positioning to improve skin integrity, interaction with environment and functional independence    Recommended Adaptive Equipment:  TBD     Home Living: Pt lives alone in a 1 story with ramp to enter with no HR. B&B on main level.  Pt. States sleeps in lift chair  Bathroom setup: tub/shower   Equipment owned: lift chair, w/c, rollator    Prior Level of Function: Pt. States grand dtr assist with showers, Ind. With other ADLs , occasional assist from family/friends with IADLs; ambulated rollator in home, w/c community  Driving: friend  Occupation: none stated    Pain Level: none at rest; moderate L hip & groin pain  Cognition: A&O: 4/4; Follows 2 step directions   Memory:  good   Sequencing:  good   Problem solving:  good   Judgement/safety:  Fair with occasional reminder     Functional Assessment:  AM-PAC Daily Activity Raw Score:  15/24   Initial Eval Status  Date: 8-5-21 Treatment Status  Date: STGs = LTGs  Time frame: 10-14 days   Feeding Independent      Grooming Set up seated  Modified [place]    UB Dressing SBA to change gown  Modified [place]    LB Dressing Max A to lucio brief on EOB  Modified [place]    Bathing Max A with sponge bathing  At EOB  Modified [place]    Toileting Dep/ incontinent of urine  Modified [place]    Bed Mobility  Supine to sit: Mod A x2   Sit to supine: NT  Supine to sit: Modified [place]   Sit to supine: Modified [place]    Functional Transfers Mod A x2 with sit <> stand, SPT using ww  Modified [place]    Functional Mobility Mod A x2 with short distance from bed > chair using ww  Modified [place]    Balance Sitting:     Static:  SBA    Dynamic:Min A  Standing: Mod A     Activity Tolerance Fair- lt. Ax.  Fair+ with mod. Ax. Visual/  Perceptual Glasses: reading        Vitals SpO2: 98% on RA  HR: 105 bpm  WFL     Hand Dominance R   AROM (PROM) Strength Additional Info:    RUE  WFL 4/5 good  and wfl FMC/dexterity noted during ADL tasks     LUE WFL 4/5 good  and wfl FMC/dexterity noted during ADL tasks       Hearing: WFL   Sensation:  No c/o numbness or tingling B UE  Tone: WFL   Edema: None noted B UE    Comments: Upon arrival patient supine in bed, agreeable to OT with PT collaboration for pt. safety, cleared by Nursing  Therapist facilitated ADLs seated & standing/ functional transfers/mobility with focus on safety, technique & precautions. Pt. Instructed RE: safe transfers/mobility, ADLs, role of OT, treatment plan, recs. , prec. At end of session, patient seated in bedside chair, all needs met, RN notified, with call light and phone within reach, all lines and tubes intact. Overall patient demonstrated decreased strength, balance, independence & safety during completion of ADL/functional transfer/mobility tasks.   Pt would benefit from continued skilled OT to increase safety and independence with completion of ADL/IADL tasks for functional License #  [id number]

## 2021-08-05 NOTE — PROGRESS NOTES
Sent Dr. Jennifer Lepe a message via perfect serve. Can he place an order for [address] for Cellulitis of BLE, R>L. Also, cream for reddness/excoriation under breasts, abd folds, palu/buttocks.

## 2021-08-05 NOTE — CARE COORDINATION
8/5, SW met with patient at bedside to discuss transition of care/discharge plan and SW role. Patient lives alone in a 1 story home with 5 steps to enter the home. Patient stated she also has a ramp that goes into the home. DME owned is a rollator, [employer] and 2 canes. PCP is Dr Refugio Hsu and pharmacy is [facility]. [facility] and [facility] in [place]. Patient stated if she needs ASHLYN she would like to go back to [employer] works there. HHC-none noted. Patient has a friend that she private pays to do light housekeeping around the home-5 days a week. Call placed to [employer] [place] at [facility] left. Will need PT/OT lencho to further discharge planning. SW to follow.     Shana Cunningham [facility]  PHYSICIANS [facility] SURGICAL [facility] Case Management  [phone]

## 2021-08-05 NOTE — ED PROVIDER NOTES
ED Attending: CC: [id number] [facility]  Department of Emergency Medicine   ED  Encounter Note  Admit Date/RoomTime: 2021  6:21 PM  [facility]  NAME: Nura Gordon  : 1940  MRN: [id number]     Chief Complaint:  Hip Pain (left hip pain, has arthritis, in pain management and has a morphine pump, states that it is not working. )    HISTORY OF PRESENT ILLNESS        Nura Gordon is a 79 y.o. female who presents to the ED by ambulance for pain to her left hip and groin, beginning 2 days ago. The complaint has been worsening and are moderate in severity. Patient states that she fractured her pelvis last year and has pain in the same area. She denies any obvious trauma. Her pain is located to the left inguinal canal region and radiates to the top of the thigh. She has a walker at home however she states that she is having a very difficult time using her walker because of the discomfort. She does have a morphine pump implanted and is currently managed by pain management. She has concerns over her pain pumps efficacy as she has a device that is allowed to administer a burst of medication for breakthrough pain but she claims she feels nothing when she does it. She denies any fever, chills, abdominal pain or back pain. He denies any bowel or bladder incontinence. There has been no saddle paresthesias. ROS   Pertinent positives and negatives are stated within HPI, all other systems reviewed and are negative. Past Medical History:  has a past medical history of Anemia, CAD (coronary artery disease), Chronic back pain, CKD (chronic kidney disease), Compression fracture of lumbar spine, non-traumatic (HCC), Compression fracture of thoracic vertebra ([address] History of blood transfusion, HTN (hypertension), Hyperlipidemia, OA (osteoarthritis), Osteoporosis, S/P AVR, and Valvular heart disease.     Surgical History:  has a past surgical history that includes ECHO Compl W Dop Color Flow (11/23/2012); Coronary artery bypass graft; Pain management procedure; Cholecystectomy; Appendectomy; Tonsillectomy; Cardiac surgery; skin biopsy; lymphadenectomy (Right); and Upper gastrointestinal endoscopy (N/A, 2/8/2021). Social History:  reports that she has never smoked. She has never used smokeless tobacco. She reports that she does not drink alcohol and does not use drugs. Family History: family history is not on file. Allergies: Iodine, Lodine [etodolac], Penicillins, and Sulfa antibiotics    PHYSICAL EXAM   Oxygen Saturation Interpretation: Normal on room air analysis. ED Triage Vitals [08/04/21 1823]   BP Temp Temp Source Pulse Resp SpO2 Height Weight   (!) 189/77 97.3 °F (36.3 °C) Temporal 67 16 94 % 5' (1.524 m) 175 lb (79.4 kg)         Physical Exam  Constitutional/General: Alert and oriented x3, well appearing, non toxic  HEENT:  NC/NT. PERRLA,  Airway patent. Neck: Supple, full ROM, non tender to palpation in the midline, no stridor, no crepitus, no meningeal signs  Respiratory: Lungs clear to auscultation bilaterally, no wheezes, rales, or rhonchi. Not in respiratory distress  CV:  Regular rate. Regular rhythm. No murmurs, gallops, or rubs. 2+ distal pulses  Chest: No chest wall tenderness  GI:  Abdomen Soft, Non tender, palpable implantable pain device in the lower abdomen. Non distended. +BS. No rebound, guarding, or rigidity. No pulsatile masses. Musculoskeletal: Moves all extremities x 4. There is tenderness to the left iliac crest and inguinal canal.  Mild tenderness to the left lateral hip with no obvious deformity. No shortening or rotation. There is significant tenderness to the left knee however the patient claims that is from arthritis. She has significant stasis dermatitis and swelling of the right lower extremity which she claims is chronic. Integument: skin warm and dry.    Lymphatic: no lymphadenopathy noted  Neurologic: GCS 15, no focal deficits, symmetric strength 5/5 in the upper extremities bilaterally  Psychiatric: Normal Affect    Lab / Imaging Results   (All laboratory and radiology results have been personally reviewed by myself)  Labs:  No results found for this visit on 08/04/21. Imaging: All Radiology results interpreted by Radiologist unless otherwise noted. CT PELVIS WO CONTRAST Additional Contrast? None    (Results Pending)       ED Course / Medical Decision Making     Medications   sodium chloride flush 0.9 % injection 10 mL (has no administration in time range)   oxyCODONE-acetaminophen (PERCOCET) 5-325 MG per tablet 1 tablet (has no administration in time range)        Re-examination:  8/4/21       Time: 2000  Her granddaughter arrived and is now at bedside to provide additional history in front of the patient and informs me that Wojciech Martines has been receiving in-house physical therapy from a friend of hers who is not a physical therapist however was present during all of her previous structured physical therapy sessions and has been continuing the same type of exercises that her physical therapist was once providing for her 3 days a week. She has her walking around the house using her walker with certain leg exercises while she is in a seated position. It was after this most recent session that she began complaining that her left groin has been hurting her and that perhaps she may have overdone it to some degree. She is able to ambulate with the use of her walker however she does have discomfort in doing so. I discussed with the granddaughter and the patient the possibility of her imaging not showing any acute findings and that decision to go home versus admission to the hospital for therapy is appropriate discussion.   At this point both the patient and the granddaughter feel comfortable with her being discharged home if there are no acute findings and that she does have someone to stay with her nearly 24 hours a day and that they are not concerned about her safety. Our plan is to obtain imaging, review and discuss appropriate disposition. .    Consult(s):   IP CONSULT TO HOSPITALIST at 2100 who will admit to gen floor pending normal labs    MDM:   Patient arrived with complaints of left hip and groin pain presumably after physical therapy. Imaging does not reveal any acute abnormalities however she does have significant discomfort with attempting to bear weight and is now not feel comfortable going home. Family discussed the situation and feels that she is too unsteady to be sent home at this time. Plan of Care/Counseling:  ZENAIDA Ayala and EM Attending Physician reviewed today's visit with the patient and patient and granddaughter in addition to providing specific details for the plan of care and counseling regarding the diagnosis and prognosis. Questions are answered at this time and are agreeable with the plan. ASSESSMENT     1. Left hip pain    2. Ataxia      PLAN   Admit to General Medical Floor. Patient condition is stable    New Medications     New Prescriptions    No medications on file     Electronically signed by ZENAIDA Ayala   DD: 8/4/21  **This report was transcribed using voice recognition software. Every effort was made to ensure accuracy; however, inadvertent computerized transcription errors may be present.   END OF ED PROVIDER NOTE       Marco A Leiva, [id number]  08/04/21 2427

## 2021-08-05 NOTE — CONSULTS
Department of Orthopedic Surgery  Resident Consult Note          Reason for Consult:  Left hip pain    HISTORY OF PRESENT ILLNESS:       Patient is a 79 y.o. female who presents with a chief complaint of left hip pain. The patient states that she has been having pain in her left hip started on Tuesday. She denies any recent trauma. She denies any fevers or chills. The patient had similar symptoms back in February. At that time there was an attempt at aspiration of her left hip to rule out septic arthritis. No fluid was able to be obtained for laboratory analysis. The patient improved with physical therapy and oral medications. The patient states that compared to yesterday her pain is improved today. States last time she had this physical therapy helped with her pain. Has been having difficulty ambulating currently due to the pain. Denies numbness/tingling/paresthesias. Denies any other orthopedic complaints at this time.        Past Medical History:        Diagnosis Date    Anemia 11/22/2012    CAD (coronary artery disease)     Chronic back pain     CKD (chronic kidney disease)     Compression fracture of lumbar spine, non-traumatic (HCC)     Compression fracture of thoracic vertebra (HCC)     History of blood transfusion     HTN (hypertension) 11/22/2012    Hyperlipidemia     OA (osteoarthritis) 11/22/2012    Osteoporosis 11/22/2012    S/P AVR     TISSUE    Valvular heart disease 11/22/2012     Past Surgical History:        Procedure Laterality Date    APPENDECTOMY      CARDIAC SURGERY      CHOLECYSTECTOMY      CORONARY ARTERY BYPASS GRAFT      ECHO COMPL W DOP COLOR FLOW  11/23/2012         LYMPHADENECTOMY Right     upper rt leg lymph node removed over 20 years ago    PAIN MANAGEMENT PROCEDURE      MORPHINE PAIN PUMP    SKIN BIOPSY      TONSILLECTOMY      UPPER GASTROINTESTINAL ENDOSCOPY N/A 2/8/2021    EGD BIOPSY performed by Edwardo Cornejo MD at [address] [place] [address] Medications:   Current Facility-Administered Medications: aspirin EC tablet 81 mg, 81 mg, Oral, Daily  carvedilol (COREG) tablet 12.5 mg, 12.5 mg, Oral, QAM  carvedilol (COREG) tablet 6.25 mg, 6.25 mg, Oral, Nightly  clopidogrel (PLAVIX) tablet 75 mg, 75 mg, Oral, Daily  ferrous sulfate (IRON 325) tablet 325 mg, 325 mg, Oral, BID  furosemide (LASIX) tablet 40 mg, 40 mg, Oral, Daily  levothyroxine (SYNTHROID) tablet 50 mcg, 50 mcg, Oral, Daily  therapeutic multivitamin-minerals 1 tablet, 1 tablet, Oral, Daily  pantoprazole (PROTONIX) tablet 40 mg, 40 mg, Oral, BID AC  rosuvastatin (CRESTOR) tablet 20 mg, 20 mg, Oral, Every Other Day  0.9 % sodium chloride infusion, , Intravenous, Continuous  sodium chloride flush 0.9 % injection 5-40 mL, 5-40 mL, Intravenous, 2 times per day  sodium chloride flush 0.9 % injection 5-40 mL, 5-40 mL, Intravenous, PRN  0.9 % sodium chloride infusion, 25 mL, Intravenous, PRN  ondansetron (ZOFRAN-ODT) disintegrating tablet 4 mg, 4 mg, Oral, Q8H PRN **OR** ondansetron (ZOFRAN) injection 4 mg, 4 mg, Intravenous, Q6H PRN  polyethylene glycol (GLYCOLAX) packet 17 g, 17 g, Oral, Daily PRN  acetaminophen (TYLENOL) tablet 650 mg, 650 mg, Oral, Q6H PRN **OR** acetaminophen (TYLENOL) suppository 650 mg, 650 mg, Rectal, Q6H PRN  senna (SENOKOT) tablet 8.6 mg, 1 tablet, Oral, Daily PRN  heparin (porcine) injection 5,000 Units, 5,000 Units, Subcutaneous, 3 times per day  cefTRIAXone (ROCEPHIN) 1,000 mg in sterile water 10 mL IV syringe, 1,000 mg, Intravenous, Q24H  traMADol (ULTRAM) tablet 50 mg, 50 mg, Oral, Q6H PRN  cyclobenzaprine (FLEXERIL) tablet 10 mg, 10 mg, Oral, TID PRN  lisinopril (PRINIVIL;ZESTRIL) tablet 5 mg, 5 mg, Oral, Daily  hydrALAZINE (APRESOLINE) injection 5 mg, 5 mg, Intravenous, Q6H PRN  miconazole (MICOTIN) 2 % powder, , Topical, BID  Allergies:  Iodine, Lodine [etodolac], Penicillins, and Sulfa antibiotics    Social History:   TOBACCO:   reports that she has never smoked.  She has never used smokeless tobacco.  ETOH:   reports no history of alcohol use. DRUGS:   reports no history of drug use. ACTIVITIES OF DAILY LIVING:    OCCUPATION:    Family History:   History reviewed. No pertinent family history. REVIEW OF SYSTEMS:  CONSTITUTIONAL:  negative for  fevers, chills  EYES:  negative for blurred vision, visual disturbance  HEENT:  negative for  hearing loss, voice change  RESPIRATORY:  negative for  dyspnea, wheezing  CARDIOVASCULAR:  negative for  chest pain, palpitations  GASTROINTESTINAL:  negative for nausea, vomiting  GENITOURINARY:  negative for frequency, urinary incontinence  HEMATOLOGIC/LYMPHATIC:  negative for bleeding and petechiae  MUSCULOSKELETAL:  positive for left hip pain  NEUROLOGICAL:  negative for headaches, dizziness  BEHAVIOR/PSYCH:  negative for increased agitation and anxiety    PHYSICAL EXAM:    VITALS:  BP (!) 118/59   Pulse 56   Temp 97.6 °F (36.4 °C) (Temporal)   Resp 16   Ht 5' (1.524 m)   Wt 173 lb 11.2 oz (78.8 kg)   SpO2 99%   BMI 33.92 kg/m²   CONSTITUTIONAL:  awake, alert, cooperative, no apparent distress, and appears stated age  MUSCULOSKELETAL:  Left lower Extremity:  Pain with passive internal rotation of the left hip. Patient has almost no discomfort with external rotation and flexion of the hip. Skin intact circumferentially no signs of trauma. Compartments soft and compressible, calf non-tender  +DP & PT pulses, Brisk Cap refill, Toes warm and perfused  Sensation grossly intact superficial/deep peroneal,saphenous,sural,tibial n. distributions  · +GS/TA/EHL. Secondary Exam:   bilateralUE: No obvious signs of trauma. -TTP to fingers, hand, wrist, forearm, elbow, humerus, shoulder or clavicle. rightLE: No obvious signs of trauma.    -TTP to foot, ankle, leg, knee, thigh, hip    · Pelvis: -TTP, -Log roll, -Heel strike     DATA:    CBC:   Lab Results   Component Value Date    WBC 9.2 08/04/2021    RBC 3.80 08/04/2021    HGB 10.5 08/04/2021    HCT 34.3 08/04/2021    MCV 90.3 08/04/2021    MCH 27.6 08/04/2021    MCHC 30.6 08/04/2021    RDW 15.8 08/04/2021     08/04/2021    MPV 11.1 08/04/2021     PT/INR:    Lab Results   Component Value Date    PROTIME 14.6 02/05/2021    INR 1.3 02/05/2021     CRP/ESR:   Lab Results   Component Value Date    CRP 1.1 08/05/2021    SEDRATE 45 08/05/2021     Lactic Acid :   Lab Results   Component Value Date    LACTA 0.8 12/29/2017       Radiology Review:  08/05/21 -CT of the abdomen pelvis reviewed demonstrates degenerative changes of bilateral hips but no acute fractures dislocations. IMPRESSION:   · Left hip pain    PLAN:  WBAT - LLE  Concern for septic arthritis at this point is very low. It is thought to be unnecessary to have the hip aspirated at this time. The patient overall has improved from yesterday to today. Pain Control recommend via nonsteroidal anti-inflammatories  PT/OT   Continue ice and elevation to decrease swelling  · Patient has had similar pain in the past which has resolved with NSAID therapy as well as physical therapy. We will continue with this plan and follow as patient progresses. If she does clinically take it during for the worse and concerned with her left hip we will consider aspiration of the left hip at that time.   · Discussed with Dr. Vania Woodward

## 2021-08-05 NOTE — H&P
Hospital Medicine History & Physical      PCP: Aden Andrade DO    Date of Admission: 8/4/2021    Date of Service: Pt seen/examined on \8/4/2021 and Admitted to Inpatient with expected LOS greater than two midnights due to medical therapy. Chief Complaint:  Left hip pain       History Of Present Illness:    79 y.o. female with past medical history of CKD compression fractures of spine  Hypothyroidism  Patient reports left hip pain and bilateral groin pain . She states that it was induced by exercises that she has been doing to gain strength . She states that she  also does leg excersises . She has a morphine pump due to osteoarthritis that does not help with hip pain . She denies any falls . She states that left hip hurts with ambulation . Family is  afraid to let her live alone with this condition . She reports no chest pain fever chills or shortness of breath     In the ED patient was hemodynamically stable . Scr was 1.5 WBC 9.2  CT scan of pelvis revealed no acute fractures or dislocation .  UA revealed signs of infection with moderate LE     Past Medical History:          Diagnosis Date    Anemia 11/22/2012    CAD (coronary artery disease)     Chronic back pain     CKD (chronic kidney disease)     Compression fracture of lumbar spine, non-traumatic (HCC)     Compression fracture of thoracic vertebra (HCC)     History of blood transfusion     HTN (hypertension) 11/22/2012    Hyperlipidemia     OA (osteoarthritis) 11/22/2012    Osteoporosis 11/22/2012    S/P AVR     TISSUE    Valvular heart disease 11/22/2012       Past Surgical History:          Procedure Laterality Date    APPENDECTOMY      CARDIAC SURGERY      CHOLECYSTECTOMY      CORONARY ARTERY BYPASS GRAFT      ECHO COMPL W DOP COLOR FLOW  11/23/2012         LYMPHADENECTOMY Right     upper rt leg lymph node removed over 20 years ago    PAIN MANAGEMENT PROCEDURE      MORPHINE PAIN PUMP    SKIN BIOPSY      TONSILLECTOMY  UPPER GASTROINTESTINAL ENDOSCOPY N/A 2/8/2021    EGD BIOPSY performed by Lizzy Yun MD at [facility]       Medications Prior to Admission:      Prior to Admission medications    Medication Sig Start Date End Date Taking? Authorizing Provider   ibuprofen (ADVIL;MOTRIN) 200 MG tablet Take 200 mg by mouth every 6 hours as needed for Pain   Yes Historical Provider, MD   levothyroxine (SYNTHROID) 50 MCG tablet Take 1 tablet by mouth Daily 2/11/21  Yes Houston Gale, DO   pantoprazole (PROTONIX) 40 MG tablet Take 1 tablet by mouth 2 times daily (before meals) 2/10/21  Yes Magda Gale, DO   Biotin 04528 MCG TABS Take 10,000 mcg by mouth   Yes Historical Provider, MD   furosemide (LASIX) 40 MG tablet Take 1 tablet by mouth daily 3/11/20  Yes Cain Serrano MD   Multiple Vitamins-Minerals (THERAPEUTIC MULTIVITAMIN-MINERALS) tablet Take 1 tablet by mouth daily   Yes Historical Provider, MD   ferrous sulfate 325 (65 Fe) MG tablet Take 325 mg by mouth 2 times daily   Yes Historical Provider, MD   magnesium 30 MG tablet Take 250 mg by mouth nightly   Yes Historical Provider, MD   carvedilol (COREG) 6.25 MG tablet Take 6.25 mg by mouth nightly   Yes Historical Provider, MD   acetaminophen (TYLENOL) 325 MG tablet Take 650 mg by mouth every 4 hours as needed for Pain (pain 1-3)   Yes Historical Provider, MD   aspirin 81 MG tablet Take 81 mg by mouth daily   Yes Historical Provider, MD   carvedilol (COREG) 6.25 MG tablet Take 12.5 mg by mouth every morning    Yes Historical Provider, MD   clopidogrel (PLAVIX) 75 MG tablet Take 75 mg by mouth daily   Yes Historical Provider, MD   rosuvastatin (CRESTOR) 20 MG tablet Take 20 mg by mouth every other day Pt. Takes every other day at home.    Yes Historical Provider, MD   Calcium Carbonate-Vitamin D (CALCIUM + D) 600-200 MG-UNIT TABS Take by mouth 2 times daily    Yes Historical Provider, MD   Cholecalciferol (VITAMIN D) 2000 UNITS CAPS capsule Take 2,000 Units by mouth daily    Yes Historical Provider, MD   lidocaine 4 % external patch Place 1 patch onto the skin daily 3/11/20   Eric Junior MD       Allergies:  Iodine, Lodine [etodolac], Penicillins, and Sulfa antibiotics    Social History:      The patient currently lives with family     TOBACCO:   reports that she has never smoked. She has never used smokeless tobacco.  ETOH:   reports no history of alcohol use. Family History:       Reviewed in detail and negative for DM, CAD, Cancer, CVA. Positive as follows:    History reviewed. No pertinent family history. REVIEW OF SYSTEMS:   Pertinent positives as noted in the HPI. All other systems reviewed and negative. PHYSICAL EXAM:    BP (!) 186/70   Pulse 73   Temp 96.7 °F (35.9 °C) (Temporal)   Resp 16   Ht 5' (1.524 m)   Wt 175 lb (79.4 kg)   SpO2 94%   BMI 34.18 kg/m²     General appearance:  No apparent distress, appears stated age and cooperative. HEENT:  Normal cephalic, atraumatic without obvious deformity. Pupils equal, round, and reactive to light. Extra ocular muscles intact. Conjunctivae/corneas clear. Neck: Supple, with full range of motion. No jugular venous distention. Trachea midline. Respiratory:  Normal respiratory effort. Clear to auscultation, bilaterally without Rales/Wheezes/Rhonchi. Cardiovascular:  Regular rate and rhythm with normal S1/S2 without murmurs, rubs or gallops. Abdomen:  implantable pain devic in lower abdomen eSoft, non-tender, non-distended with normal bowel sounds. Musculoskeletal:  Tenderness left illiac crest an inguinal. Tenderness to left latera hip n canal  No clubbing, cyanosis or edema bilaterally. Full range of motion without deformity. Skin: Skin color, texture, turgor normal.  No rashes or lesions. Neurologic:  Neurovascularly intact without any focal sensory/motor deficits.  Cranial nerves: II-XII intact, grossly non-focal.  Psychiatric:  Alert and oriented, thought content appropriate, normal

## 2021-08-06 NOTE — CARE COORDINATION
8/6/21 Update CM Note: Patient is down to xray for an u/s retroperitoneal. She remains observation and if the renal u/s is negative pcp plans on possible discharge. Referral was made to [facility] and acceptance is pending. PT 15/24 and OT 13/24. K 3.3 and taking oral supplements. Will follow for acceptance and readiness to discharge.  Liz Bourgeois RN CM

## 2021-08-06 NOTE — PROGRESS NOTES
Hospitalist Progress Note      SYNOPSIS: Patient admitted on 2021 for Left Hip Pain    SUBJECTIVE:    Patient seen and examined at bedside in NAD. Left hip pain largely unchanged. BP remains elevated on dual anti-HTN management. She otherwise denies any other complaints. Records reviewed. Stable overnight. No other overnight issues reported. Temp (24hrs), Av.4 °F (36.3 °C), Min:97.2 °F (36.2 °C), Max:97.6 °F (36.4 °C)    DIET: ADULT DIET; Regular  CODE: Full Code    Intake/Output Summary (Last 24 hours) at 2021 1530  Last data filed at 2021 1427  Gross per 24 hour   Intake 1832.61 ml   Output 1450 ml   Net 382.61 ml       OBJECTIVE:    BP (!) 184/74   Pulse 66   Temp 97.2 °F (36.2 °C) (Temporal)   Resp 12   Ht 5' (1.524 m)   Wt 175 lb (79.4 kg)   SpO2 95%   BMI 34.18 kg/m²     General appearance: No apparent distress, appears stated age and cooperative. HEENT:  Conjunctivae/corneas clear. Neck: Supple. No jugular venous distention. Respiratory: Clear to auscultation bilaterally, normal respiratory effort  Cardiovascular: Regular rate rhythm, normal S1-S2  Abdomen: Soft, nontender, nondistended  Musculoskeletal: No clubbing, cyanosis, no bilateral lower extremity edema. Brisk capillary refill. Left hip pain on palpation and with minimal exertion. Skin:  No rashes  on visible skin  Neurologic: awake, alert and following commands     ASSESSMENT and PLAN:     Left Hip Pain with Ambulatory dysfunction:  - CT reviewed no acute fractures  - Ortho c/s noted and appreciated; following. Conservative management in interim.  - PT/OT  - Pain control    CKD stage 3:  - Pts baseline since 2021 ~1.5  - CT incidentally with worsening atrophy of left kidney  - Renal artery US without right renal involvement, unable to assess left renal artery 2/2 overlying gas  - Will intensify anti-HTN (cont on lasix 40 PO QD and increase lisinopril from 10 to 20mg PO QD). Cont on PRN Hydralazine.  Re-assess in am  - Encourage PO hydration and stop IVF  - Monitor renal function and avoid nephrotoxic meds when able     Urinary tract infection:  - UA moderate LE with bacteruria  - C/w empiric IV Rocephin x 3 days total; de-escalate as able    Hypertension:  - C/w Coreg, Lisinopril and Lasix  - PRN Hydralazine      Chronic diastolic CHF:  - 2D echo reveals EF 65%  - C/w home Lasix as above     CAD:  - Cont home ASA, Plavix and statin      Normocytic anemia:  - Monitor  - Maintain Hgb>7      DVT Prophylaxis: Heparin   Diet: ADULT DIET;  Regular  Code Status: Full Code    DISPOSITION: Med/Surg    Medications:  REVIEWED DAILY    Infusion Medications    sodium chloride       Scheduled Medications    potassium chloride  40 mEq Oral BID WC    [START ON 8/7/2021] lisinopril  20 mg Oral Daily    amLODIPine  10 mg Oral Daily    aspirin  81 mg Oral Daily    carvedilol  12.5 mg Oral QAM    carvedilol  6.25 mg Oral Nightly    clopidogrel  75 mg Oral Daily    ferrous sulfate  325 mg Oral BID    furosemide  40 mg Oral Daily    levothyroxine  50 mcg Oral Daily    therapeutic multivitamin-minerals  1 tablet Oral Daily    pantoprazole  40 mg Oral BID AC    rosuvastatin  20 mg Oral Every Other Day    sodium chloride flush  5-40 mL Intravenous 2 times per day    heparin (porcine)  5,000 Units Subcutaneous 3 times per day    cefTRIAXone (ROCEPHIN) IV  1,000 mg Intravenous Q24H    miconazole   Topical BID     PRN Meds: sodium chloride flush, sodium chloride, ondansetron **OR** ondansetron, polyethylene glycol, acetaminophen **OR** acetaminophen, senna, traMADol, cyclobenzaprine, hydrALAZINE    Labs:     Recent Labs     08/04/21 2235 08/06/21  0620   WBC 9.2 11.7*   HGB 10.5* 9.4*   HCT 34.3 29.8*    206       Recent Labs     08/04/21 2235 08/06/21  0620    141   K 4.0 3.3*    110*   CO2 25 24   BUN 40* 27*   CREATININE 1.5* 1.2*   CALCIUM 9.4 8.1*       Recent Labs     08/04/21 2235 08/06/21  0620   PROT 6. 2* 5.3*   ALKPHOS 118* 89   ALT 10 8   AST 15 13   BILITOT 0.4 <0.2       No results for input(s): INR in the last 72 hours. No results for input(s): Lasha Clap in the last 72 hours. Chronic labs:    Lab Results   Component Value Date    CHOL 117 02/17/2021    TRIG 160 (H) 02/17/2021    HDL 26 02/17/2021    LDLCALC 59 02/17/2021    TSH 2.510 02/17/2021    INR 1.3 02/05/2021       Radiology: REVIEWED DAILY    +++++++++++++++++++++++++++++++++++++++++++++++++  Edil Phipps MD  [facility] [place] [facility] - [address] [place] [address]  +++++++++++++++++++++++++++++++++++++++++++++++++  NOTE: This report was transcribed using voice recognition software. Every effort was made to ensure accuracy; however, inadvertent computerized transcription errors may be present.

## 2021-08-06 NOTE — PLAN OF CARE
Problem: Falls - Risk of:  Goal: Will remain free from falls  Description: Will remain free from falls  Outcome: Met This Shift  Goal: Absence of physical injury  Description: Absence of physical injury  Outcome: Met This Shift     Problem: Skin Integrity:  Goal: Will show no infection signs and symptoms  Description: Will show no infection signs and symptoms  Outcome: Met This Shift  Goal: Absence of new skin breakdown  Description: Absence of new skin breakdown  Outcome: Met This Shift     Problem: Pain:  Goal: Pain level will decrease  Description: Pain level will decrease  Outcome: Ongoing  Goal: Control of chronic pain  Description: Control of chronic pain  Outcome: Ongoing

## 2021-08-06 NOTE — PROGRESS NOTES
Department of Orthopedic Surgery  Resident Progress Note    Patient seen and examined. Pain controlled. No new complaints. Denies chest pain, shortness of breath, dizziness/lightheadedness. Left hip pain is about the same as yesterday    VITALS:  BP (!) 142/68   Pulse 66   Temp 97.2 °F (36.2 °C) (Temporal)   Resp 12   Ht 5' (1.524 m)   Wt 175 lb (79.4 kg)   SpO2 95%   BMI 34.18 kg/m²     General: awake alert in no acute distress    MUSCULOSKELETAL:   left lower extremity:  · Pain with passive internal rotation some discomfort with flexion and eternal rotation  · Compartments soft and compressible  · +PF/DF/EHL  · +2/4 DP & PT pulses, Brisk Cap refill, Toes warm and perfused  · Distal sensation grossly intact to Peroneals, Sural, Saphenous, and tibial nrs    CBC:   Lab Results   Component Value Date    WBC 11.7 08/06/2021    HGB 9.4 08/06/2021    HCT 29.8 08/06/2021     08/06/2021     PT/INR:    Lab Results   Component Value Date    PROTIME 14.6 02/05/2021    INR 1.3 02/05/2021       IMPRESSION:   · Left hip pain     PLAN:  · WBAT - LLE  · Concern for septic arthritis at this point is very low. It is thought to be unnecessary to have the hip aspirated at this time. The patient overall has improved from yesterday to today. · Pain Control recommend via nonsteroidal anti-inflammatories  · PT/OT   · Continue ice and elevation to decrease swelling  · Patient has had similar pain in the past which has resolved with NSAID therapy as well as physical therapy. We will continue with this plan and follow as patient progresses. If she does clinically take it during for the worse and concerned with her left hip we will consider aspiration of the left hip at that time.   · Discussed with Dr. Dalia Mesa

## 2021-08-06 NOTE — PROGRESS NOTES
I spoke to her grandson Kenyetta Valadez [phone] and grand daughter NICOLE WIN [facility] [phone] today regarding updates on their grandmother/mother. [facility] called around [facility] and advised me that Wojciech Martines goes to [facility] where her pain pump is managed by Dr. Servando Lopez.

## 2021-08-06 NOTE — CARE COORDINATION
8/6, DEBORA spoke with Sonja from [facility] and patient has been accepted-no precert is needed. Patient can go to facility once medically cleared for discharge. Patient will need a COVID test with negative results prior to discharge. COVID test must not be more than 3 days. Negative COVID results need faxed to [facility] at [facility] at [phone]. Should patient discharge over the weekend contact the [facility] number at [phone] and hit station 1. PAS/RR and review letter along with ambulance (PAS. Letter faxed) and envelope on soft chart. Patient is set up as a WILL CALL with Physicians phone number is [phone]. DEBORA to follow for further discharge planning needs.       Анна Matos, LUCIEW  [address] Case Management  [phone]

## 2021-08-06 NOTE — FLOWSHEET NOTE
Inpatient Wound Care (Initial consult) 5207A    Admit Date: 8/4/2021  6:21 PM    Reason for consult:  Bilateral lower extremities    Significant history:  Per H&P    Chief Complaint:  Left hip pain         History Of Present Illness:    79 y.o. female with past medical history of CKD compression fractures of spine  Hypothyroidism  Patient reports left hip pain and bilateral groin pain . She states that it was induced by exercises that she has been doing to gain strength . She states that she  also does leg excersises . She has a morphine pump due to osteoarthritis that does not help with hip pain . She denies any falls . She states that left hip hurts with ambulation . Family is  afraid to let her live alone with this condition . She reports no chest pain fever chills or shortness of breath     Findings:     08/06/21 1428   Skin Integrity   Skin Integrity Bruising   Location BUE   Skin Integrity Site 2   Skin Integrity Location 2   (redness, dry,flaky)   Location 2 BLE   Skin Integrity Site 3   Skin Integrity Location 3   (vascular discoloration)    Location 3 LLE   Skin Integrity Site 4   Skin Integrity Location 4 Excoriation   Location 4   (abdominal folds)     Bilateral buttocks: redness, blanchable    Impression:  Skin assessment complete: see above     Plan: antifungal powder  TAPS  Heel protectors  Patient will need continued preventative care.       Monica Carroll RN 8/6/2021 2:30 PM

## 2021-08-07 NOTE — PROGRESS NOTES
Department of Orthopedic Surgery  Resident Progress Note    Patient seen and examined. Pain controlled. No new complaints. Denies chest pain, shortness of breath, dizziness/lightheadedness. Left hip pain is improved from yesterday    VITALS:  BP (!) 135/58   Pulse 66   Temp 97.7 °F (36.5 °C) (Temporal)   Resp 16   Ht 5' (1.524 m)   Wt 175 lb (79.4 kg)   SpO2 94%   BMI 34.18 kg/m²     General: awake alert in no acute distress    MUSCULOSKELETAL:   left lower extremity:  · Pain with passive internal rotation some discomfort with flexion and eternal rotation  · Compartments soft and compressible  · +PF/DF/EHL  · +2/4 DP & PT pulses, Brisk Cap refill, Toes warm and perfused  · Distal sensation grossly intact to Peroneals, Sural, Saphenous, and tibial nrs    CBC:   Lab Results   Component Value Date    WBC 9.3 08/07/2021    HGB 9.8 08/07/2021    HCT 31.9 08/07/2021     08/07/2021     PT/INR:    Lab Results   Component Value Date    PROTIME 14.6 02/05/2021    INR 1.3 02/05/2021       IMPRESSION:   · Left hip pain     PLAN:  · WBAT - LLE  · Concern for septic arthritis at this point is very low. It is thought to be unnecessary to have the hip aspirated at this time. The patient overall has improved from yesterday to today. · Pain Control recommend via nonsteroidal anti-inflammatories  · PT/OT   · Continue ice and elevation to decrease swelling  · Patient is ok for discharge from orthopedic standpoint  · Discussed with Dr. Sarah Ghosh attending    Manuel Nobles to evaluate patient however she has since been discharged. Reviewed her CT imaging which does demonstrate nondisplaced fracture left acetabulum anterior wall. This is evident on the 8/4/2021 CT and not evident on the one from February. I did reach out to patient's son Prabha Mishra discussed her fracture. Discussed she can be weightbearing as tolerated with assistive device however would limit her activities at this time.   Recommend her to follow-up in my office in 10-14 days for repeat evaluation. Instructed patient son to contact my office early next week to arrange for follow-up appointment which she verbalized understanding and agreement.     Electronically Signed By  Chang Day D.O.  8/7/2021

## 2021-08-07 NOTE — DISCHARGE SUMMARY
Hospitalist Discharge Summary    Patient ID: Ally Mcclain   Patient : 1940  Patient's PCP: Shubham Vasquez DO    Admit Date: 2021   Admitting Physician: Tamra Swanson MD    Discharge Date:  2021   Discharge Physician: Adore Boogie MD   Discharge Condition: Stable  [facility]:  (Please refer to daily progress notes for a comprehensive review of the hospitalization by requesting medical records)    HPI per Dr. Philippe Mejias: 79 y.o. female with past medical history of CKD compression fractures of spine  Hypothyroidism  Patient reports left hip pain and bilateral groin pain . She states that it was induced by exercises that she has been doing to gain strength . She states that she  also does leg excersises . She has a morphine pump due to osteoarthritis that does not help with hip pain . She denies any falls . She states that left hip hurts with ambulation . Family is  afraid to let her live alone with this condition . She reports no chest pain fever chills or shortness of breath. In the ED patient was hemodynamically stable . Scr was 1.5 WBC 9.2  CT scan of pelvis revealed no acute fractures or dislocation . UA revealed signs of infection with moderate LE     Hospital Course:    Left Hip Pain with Ambulatory dysfunction:  - CT reviewed no acute fractures  - Ortho c/s noted and appreciated;no further work-up. Conservative management in interim.  - PT/OT.  Pt for SNF  - Cont with PRN Pain control     CKD stage 3:  - Pts baseline since 2021 ~1.5  - CT incidentally with worsening atrophy of left kidney  - Renal artery US without right renal involvement, unable to assess left renal artery 2/2 overlying gas  - BP more controlled with lasix 40 PO QD and Lisinopril 20mg PO QD  - Encourage PO hydration  - PCP as outpatient     Urinary tract infection:  - Managed with empiric IV Rocephin x 2 days, cont on Macrobid 100mg BID x 3 days as outpatient     Hypertension:  - C/w Coreg, Lisinopril and Lasix  - Montior     Chronic diastolic CHF:  - 2D echo reveals EF 65%  - C/w home Lasix as above     CAD:  - Cont home ASA, Plavix and statin      Normocytic anemia:  - Hgb stable    Patient seen and examined at bedside in NAD. Left hip pain slowly improving. Pt otherwise denies any complaints. Pt is hemodynamically stable for discharge to SNF. Consults:   IP CONSULT TO HOSPITALIST  IP CONSULT TO SOCIAL WORK  IP CONSULT TO ORTHOPEDIC SURGERY    Discharge Instructions / Follow up:    PCP    Continued appropriate risk factor modification of blood pressure, diabetes and serum lipids will remain essential to reducing risk of future atherosclerotic development    Activity: activity as tolerated    Significant labs:  CBC:   Recent Labs     08/04/21 2235 08/06/21 0620 08/07/21  0450   WBC 9.2 11.7* 9.3   RBC 3.80 3.30* 3.51   HGB 10.5* 9.4* 9.8*   HCT 34.3 29.8* 31.9*   MCV 90.3 90.3 90.9   RDW 15.8* 15.8* 15.9*    206 226     BMP:   Recent Labs     08/04/21 2235 08/06/21 0620 08/07/21  0450    141 141   K 4.0 3.3* 4.5    110* 106   CO2 25 24 25   BUN 40* 27* 24*   CREATININE 1.5* 1.2* 1.2*   MG  --  1.6  --      LFT:  Recent Labs     08/04/21 2235 08/06/21 0620 08/07/21  0450   PROT 6.2* 5.3* 5.9*   ALKPHOS 118* 89 103   ALT 10 8 8   AST 15 13 14   BILITOT 0.4 <0.2 0.3     PT/INR: No results for input(s): INR, APTT in the last 72 hours. BNP: No results for input(s): BNP in the last 72 hours.   Hgb A1C: No results found for: LABA1C  Folate and B12:   Lab Results   Component Value Date    NFKFEXZG51 >2000 (H) 12/29/2017   ,   Lab Results   Component Value Date    FOLATE 18.9 12/29/2017     Thyroid Studies:   Lab Results   Component Value Date    TSH 2.510 02/17/2021    M4ELASW 7.1 11/21/2012       Urinalysis:    Lab Results   Component Value Date    NITRU Negative 08/04/2021    WBCUA 10-20 08/04/2021    BACTERIA NONE SEEN 08/04/2021 RBCUA NONE 08/04/2021    RBCUA NONE 11/21/2012    BLOODU Negative 08/04/2021    SPECGRAV 1.010 08/04/2021    GLUCOSEU Negative 08/04/2021       Imaging:  CT PELVIS WO CONTRAST Additional Contrast? None    Result Date: 8/4/2021  EXAMINATION: CT OF THE PELVIS WITHOUT CONTRAST 8/4/2021 8:07 pm TECHNIQUE: CT of the pelvis was performed without the administration of intravenous contrast.  Multiplanar reformatted images are provided for review. Dose modulation, iterative reconstruction, and/or weight based adjustment of the mA/kV was utilized to reduce the radiation dose to as low as reasonably achievable. COMPARISON: CT abdomen February 5, 2021 HISTORY: ORDERING SYSTEM PROVIDED HISTORY: pelvic pain TECHNOLOGIST PROVIDED HISTORY: Additional Contrast?->None Reason for exam:->pelvic pain Decision Support Exception - unselect if not a suspected or confirmed emergency medical condition->Emergency Medical Condition (MA) What reading provider will be dictating this exam?->CRC FINDINGS: There is healed fracture of the right superior inferior pubic rami. No acute fractures or dislocations in the pelvis. Midline umbilical hernia identified containing mesenteric fat. Bladder is unremarkable. No abnormal pelvic lymphadenopathy. Atrophic appearance of left kidney, worsened compared to February 2021. No acute fractures or dislocations in the pelvis. Atrophic appearance of left kidney, worsened compared to February 2021. Midline umbilical hernia identified containing mesenteric fat. US DUP ABD PEL RETRO SCROT COMPLETE    Result Date: 8/6/2021  EXAMINATION: RETROPERITONEAL ULTRASOUND OF THE KIDNEYS AND URINARY BLADDER 8/6/2021 COMPARISON: None HISTORY: ORDERING SYSTEM PROVIDED HISTORY: Atrophic left kidney with malignant HTN TECHNOLOGIST PROVIDED HISTORY: Reason for exam:->Atrophic left kidney with malignant HTN What reading provider will be dictating this exam?->CRC FINDINGS: Kidneys:  The right kidney measures 9.4 cm in length and the left kidney measures 8.6 cm in length. The right kidney demonstrates normal cortical echogenicity. No evidence of hydronephrosis or intrarenal stones. No renal mass is seen. The left kidney appears atrophic and echogenic. Bladder: The bladder is well distended with no obvious intraluminal mass or wall thickening seen. Prevoid bladder volume is estimated at 640 mL. No postvoid imaging was done. RENAL ARTERY DOPPLER: The study is compromised due to patient's body habitus and excessive overlying bowel gas. The distal aorta and bifurcation could not be visualized. Peak systolic velocity in the abdominal aorta is 184 cm/sec. Peak systolic velocity in the proximal, mid and distal right renal artery are 165, 105 and 129 cm/sec. Peak systolic velocity in the proximal, mid and distal left renal artery are 166, 136 and 129 cm/sec respectively. The renal artery/aortic velocity ratios are 0.9 bilaterally. These measurements are in the normal range with no evidence of hemodynamically significant stenosis. 1. No evidence of hemodynamically significant renal artery stenosis. However the, the study is compromised by patient body habitus and overlying gas. If clinically indicated, CT angiography of the renal arteries could be done for further evaluation. 2. Atrophic, echogenic left kidney. 3. Otherwise, unremarkable ultrasound of the right kidney and bladder. No evidence of hydronephrosis. US RETROPERITONEAL JAYLEEN    Result Date: 8/6/2021  EXAMINATION: RETROPERITONEAL ULTRASOUND OF THE KIDNEYS AND URINARY BLADDER 8/6/2021 COMPARISON: None HISTORY: ORDERING SYSTEM PROVIDED HISTORY: Atrophic left kidney with malignant HTN TECHNOLOGIST PROVIDED HISTORY: Reason for exam:->Atrophic left kidney with malignant HTN What reading provider will be dictating this exam?->CRC FINDINGS: Kidneys: The right kidney measures 9.4 cm in length and the left kidney measures 8.6 cm in length.  The right kidney demonstrates normal cortical echogenicity. No evidence of hydronephrosis or intrarenal stones. No renal mass is seen. The left kidney appears atrophic and echogenic. Bladder: The bladder is well distended with no obvious intraluminal mass or wall thickening seen. Prevoid bladder volume is estimated at 640 mL. No postvoid imaging was done. RENAL ARTERY DOPPLER: The study is compromised due to patient's body habitus and excessive overlying bowel gas. The distal aorta and bifurcation could not be visualized. Peak systolic velocity in the abdominal aorta is 184 cm/sec. Peak systolic velocity in the proximal, mid and distal right renal artery are 165, 105 and 129 cm/sec. Peak systolic velocity in the proximal, mid and distal left renal artery are 166, 136 and 129 cm/sec respectively. The renal artery/aortic velocity ratios are 0.9 bilaterally. These measurements are in the normal range with no evidence of hemodynamically significant stenosis. 1. No evidence of hemodynamically significant renal artery stenosis. However the, the study is compromised by patient body habitus and overlying gas. If clinically indicated, CT angiography of the renal arteries could be done for further evaluation. 2. Atrophic, echogenic left kidney. 3. Otherwise, unremarkable ultrasound of the right kidney and bladder. No evidence of hydronephrosis. Discharge Medications:      Medication List      START taking these medications    cyclobenzaprine 10 MG tablet  Commonly known as: FLEXERIL  Take 1 tablet by mouth 3 times daily as needed for Muscle spasms     lisinopril 20 MG tablet  Commonly known as: PRINIVIL;ZESTRIL  Take 1 tablet by mouth daily     miconazole 2 % powder  Commonly known as: MICOTIN  Apply topically 2 times daily.      nitrofurantoin (macrocrystal-monohydrate) 100 MG capsule  Commonly known as: MACROBID  Take 1 capsule by mouth 2 times daily for 5 days     polyethylene glycol 17 g packet  Commonly known as: GLYCOLAX  Take 17 g by mouth daily as needed for Constipation     potassium chloride 20 MEQ extended release tablet  Commonly known as: KLOR-CON M  Take 1 tablet by mouth daily for 7 days     senna 8.6 MG tablet  Commonly known as: SENOKOT  Take 1 tablet by mouth daily as needed (Constipation)     traMADol 50 MG tablet  Commonly known as: ULTRAM  Take 1 tablet by mouth every 6 hours as needed for Pain for up to 3 days. CONTINUE taking these medications    acetaminophen 325 MG tablet  Commonly known as: TYLENOL     aspirin 81 MG tablet     Biotin 45657 MCG Tabs     Calcium + D 600-200 MG-UNIT Tabs  Generic drug: calcium carbonate-vitamin D     * carvedilol 6.25 MG tablet  Commonly known as: COREG     * carvedilol 6.25 MG tablet  Commonly known as: COREG     clopidogrel 75 MG tablet  Commonly known as: PLAVIX     Crestor 20 MG tablet  Generic drug: rosuvastatin     ferrous sulfate 325 (65 Fe) MG tablet  Commonly known as: IRON 325     furosemide 40 MG tablet  Commonly known as: LASIX  Take 1 tablet by mouth daily     levothyroxine 50 MCG tablet  Commonly known as: SYNTHROID  Take 1 tablet by mouth Daily     lidocaine 4 % external patch  Place 1 patch onto the skin daily     magnesium 30 MG tablet     pantoprazole 40 MG tablet  Commonly known as: PROTONIX  Take 1 tablet by mouth 2 times daily (before meals)     therapeutic multivitamin-minerals tablet     vitamin D 50 MCG (2000 UT) Caps capsule         * This list has 2 medication(s) that are the same as other medications prescribed for you. Read the directions carefully, and ask your doctor or other care provider to review them with you.             STOP taking these medications    ibuprofen 200 MG tablet  Commonly known as: ADVIL;MOTRIN           Where to Get Your Medications      These medications were sent to [place] [address], [address], [address] [place] [address]    Phone: [phone]   · lisinopril 20 MG tablet     You can get these medications from any pharmacy    Bring a paper prescription for each of these medications  · traMADol 50 MG tablet     Information about where to get these medications is not yet available    Ask your nurse or doctor about these medications  · cyclobenzaprine 10 MG tablet  · miconazole 2 % powder  · nitrofurantoin (macrocrystal-monohydrate) 100 MG capsule  · polyethylene glycol 17 g packet  · potassium chloride 20 MEQ extended release tablet  · senna 8.6 MG tablet         Time Spent on discharge is more than 45 minutes in the examination, evaluation, counseling and review of medications and discharge plan.    +++++++++++++++++++++++++++++++++++++++++++++++++  Amisha Rivera MD  [address]  +++++++++++++++++++++++++++++++++++++++++++++++++  NOTE: This report was transcribed using voice recognition software. Every effort was made to ensure accuracy; however, inadvertent computerized transcription errors may be present.

## 2021-08-07 NOTE — PLAN OF CARE
Problem: Pain:  Goal: Pain level will decrease  Description: Pain level will decrease  8/7/2021 1404 by Betty Peck RN  Outcome: Met This Shift  8/7/2021 0013 by Pepper Flores RN  Outcome: Ongoing     Problem: Pain:  Goal: Control of acute pain  Description: Control of acute pain  8/7/2021 1404 by Betty Peck RN  Outcome: Met This Shift  8/7/2021 0013 by Pepper Flores RN  Outcome: Ongoing     Problem: Pain:  Goal: Control of chronic pain  Description: Control of chronic pain  8/7/2021 1404 by Betty Peck RN  Outcome: Met This Shift  8/7/2021 0013 by Pepper Flores RN  Outcome: Ongoing     Problem: Falls - Risk of:  Goal: Will remain free from falls  Description: Will remain free from falls  8/7/2021 1404 by Betty Peck RN  Outcome: Met This Shift  8/7/2021 0013 by Pepper Flores RN  Outcome: Met This Shift     Problem: Falls - Risk of:  Goal: Absence of physical injury  Description: Absence of physical injury  8/7/2021 1404 by Betty Peck RN  Outcome: Met This Shift  8/7/2021 0013 by Pepper Flores RN  Outcome: Met This Shift     Problem: Skin Integrity:  Goal: Will show no infection signs and symptoms  Description: Will show no infection signs and symptoms  8/7/2021 1404 by Betty Peck RN  Outcome: Met This Shift  8/7/2021 0013 by Pepper Flores RN  Outcome: Met This Shift     Problem: Skin Integrity:  Goal: Absence of new skin breakdown  Description: Absence of new skin breakdown  8/7/2021 1404 by Betty Peck RN  Outcome: Met This Shift  8/7/2021 0013 by Pepper Flores RN  Outcome: Met This Shift

## 2021-08-07 NOTE — DISCHARGE INSTR - COC
Continuity of Care Form    Patient Name: Natalya Veronica   :  1940  MRN:  [id number]    Admit date:  2021  Discharge date:  21    Code Status Order: Full Code   Advance Directives:     Admitting Physician:  Shellie Nichols MD  PCP: Xenia Moreno DO    Discharging Nurse: [facility] Unit/Room#: 1269/0397-D  Discharging Unit Phone Number: [phone]    Emergency Contact:   Extended Emergency Contact Information  Primary Emergency Contact: Girish Bhakta   [address] Phone: [phone]  Mobile Phone: [phone]  Relation: Child    Past Surgical History:  Past Surgical History:   Procedure Laterality Date    APPENDECTOMY      CARDIAC SURGERY      CHOLECYSTECTOMY      CORONARY ARTERY BYPASS GRAFT      ECHO COMPL W DOP COLOR FLOW  2012         LYMPHADENECTOMY Right     upper rt leg lymph node removed over 20 years ago    PAIN MANAGEMENT PROCEDURE      MORPHINE PAIN PUMP    SKIN BIOPSY      TONSILLECTOMY      UPPER GASTROINTESTINAL ENDOSCOPY N/A 2021    EGD BIOPSY performed by Lita Bahena MD at [address] [place] [address]       Immunization History:   Immunization History   Administered Date(s) Administered    Influenza Virus Vaccine 2012    Pneumococcal Polysaccharide ([id number]) 2012       Active Problems:  Patient Active Problem List   Diagnosis Code    Fall due to stumbling W01. 0XXA    Anemia D64.9    Essential hypertension I10    OA (osteoarthritis) M19.90    Spinal stenosis M48.00    Osteoporosis M81.0    Chronic pain syndrome G89.4    Facial contusion S00.83XA    Compression fx, lumbar spine (HCC) S32.000A    Compression fx, thoracic spine (HCC) S22.000A    Valvular heart disease I38    Tachycardia R00.0    Closed fracture of nasal bone S02. 2XXA    Acute kidney injury ([facility] [address] N17.9    Closed fracture of sacrum, initial encounter ([facility] [address] S32.10XA    DDD (degenerative disc disease), lumbar M51.36    Hypoalbuminemia E88.09    Ambulatory dysfunction R26.2    Debility R53.81    CKD (chronic kidney disease) N18.9    History of aortic valve replacement Z95.2    Multiple fracture T07. Elana Juarez    Acute kidney injury superimposed on chronic kidney disease (HCC) N17.9, N18.9    Gastroduodenitis K29.90    Hypokalemia E87.6    Volume overload E87.70    Hip pain, left M25.552    Low back pain M54.5    CAD (coronary artery disease) I25.10    HLD (hyperlipidemia) E78.5    Closed fracture of second lumbar vertebra (HCC) S32.029A    Edema R60.9    Epigastric pain R10.13    Hip pain M25.559       Isolation/Infection:   Isolation          No Isolation        Patient Infection Status     Infection Onset Added Last Indicated Last Indicated By Review Planned Expiration Resolved Resolved By    None active    Resolved    COVID-19 Rule Out 02/05/21 02/05/21 02/05/21 COVID-19 (Ordered)   02/05/21 Rule-Out Test Resulted          Nurse Assessment:  Last Vital Signs: BP (!) 140/62   Pulse 80   Temp 97.8 °F (36.6 °C) (Temporal)   Resp 18   Ht 5' (1.524 m)   Wt 175 lb (79.4 kg)   SpO2 97%   BMI 34.18 kg/m²     Last documented pain score (0-10 scale): Pain Level: 6  Last Weight:   Wt Readings from Last 1 Encounters:   08/07/21 175 lb (79.4 kg)     Mental Status:  oriented and alert    IV Access:  - None    Nursing Mobility/ADLs:  Walking   Assisted  Transfer  Assisted  Bathing  Assisted  Dressing  Assisted  Toileting  Assisted  Feeding  Independent  Med Admin  Assisted  Med Delivery   whole    Wound Care Documentation and Therapy:        Elimination:  Continence:   · Bowel: Yes  · Bladder: Yes  Urinary Catheter: None   Colostomy/Ileostomy/Ileal Conduit: No       Date of Last BM: 8/3/21    Intake/Output Summary (Last 24 hours) at 8/7/2021 1131  Last data filed at 8/7/2021 0830  Gross per 24 hour   Intake 420 ml   Output 1550 ml   Net -1130 ml     I/O last 3 completed shifts: In: 240 [P.O.:240]  Out: [place] [Urine:1550]    Safety Concerns:      At Risk for Falls    Impairments/Disabilities:      None    Nutrition Therapy:  Current Nutrition Therapy:   - Oral Diet:  General    Routes of Feeding: Oral  Liquids: Thin Liquids  Daily Fluid Restriction: no  Last Modified Barium Swallow with Video (Video Swallowing Test): not done    Treatments at the Time of Hospital Discharge:   Respiratory Treatments: ***  Oxygen Therapy:  is not on home oxygen therapy. Ventilator:    - No ventilator support    Rehab Therapies: PT and OT to eval and treat   Weight Bearing Status/Restrictions: No weight bearing restirctions  Other Medical Equipment (for information only, NOT a DME order):  hospital bed  Other Treatments: ***    Patient's personal belongings (please select all that are sent with patient):  None    RN SIGNATURE:  Electronically signed by Raeann Childers RN on 8/7/21 at 1:04 PM EDT    CASE MANAGEMENT/SOCIAL WORK SECTION    Inpatient Status Date: ***    Readmission Risk Assessment Score:  Readmission Risk              Risk of Unplanned Readmission:  18           Discharging to Facility/ Agency   · Name: [facility]. · Address:  · Phone:[phone]  N/n to station 1   · Fax:[phone]    Dialysis Facility (if applicable)   · Name:  · Address:  · Dialysis Schedule:  · Phone:  · Fax:    / signature: Electronically signed by BAILEY Sage on 8/7/2021 at 11:32 AM      PHYSICIAN SECTION    Prognosis: {Prognosis:2140434046}    Condition at Discharge: [address] Patient Condition:[id number]}    Rehab Potential (if transferring to Rehab): {Prognosis:0420179081}    Recommended Labs or Other Treatments After Discharge: ***    Physician Certification: I certify the above information and transfer of Anne Pat  is necessary for the continuing treatment of the diagnosis listed and that she requires [facility] for less 30 days.      Update Admission H&P: {CHP DME Changes in GXYVK:311311023}    PHYSICIAN SIGNATURE:

## 2021-08-07 NOTE — CARE COORDINATION
SOCIAL WORK/CASEMANAGEMENT TRANSITION OF CARE [id number] [facility], [address], [place], [phone]): discharge to [facility] in [address] today with pas  at 2 p.m. via ambulette. Snf/loc. Called son, christy, and he with pt agree with discharge.  Erskin Oppenheim, LSW  8/7/2021

## 2021-08-09 NOTE — TELEPHONE ENCOUNTER
Call placed to patient to schedule hospital follow up appointment. No answer, left voicemail. Upon review of chart, patient discharged to [facility] in [place]. Call placed to [phone], spoke to patient's nurse Amairani . Appointment discussed at this time. Encouraged to call with further questions or concerns.     Future Appointments   Date Time Provider Praveen Shepherd   8/24/2021 10:15 AM Jorgito Proctor DO [facility]

## 2021-08-24 NOTE — PROGRESS NOTES
Sarmad Catherine is a 79 y.o. female who presents for evaluation of left anterior wall tab fx    Referred from ED    Symptom onset: Date: 08/04/2021  Mechanism of Injury: fall    Previous Treatments: rest which have been somewhat effective      Weightbearing: right lower and left lower Non-weight bearing and Toe touch weight bearing    Assistive device wheelchair  Participating in therapy?  yes, at [facility]

## 2021-08-24 NOTE — PATIENT INSTRUCTIONS
Patient seen and examined today. Patient does have a left acetabulum fracture which is nondisplaced. We are planning for nonoperative treatment. Patient can be weightbearing on the left lower extremity with assistance she is a high fall risk. Recommend continued therapy at a facility for global strengthening bilateral lower extremities and core. Also recommend patient follows up with her primary doctor pain management specialist regarding her morphine pump which she does not feel is working appropriately. Would like to see the patient back in orthopedic office in 6 weeks for repeat evaluation.

## 2021-08-26 PROBLEM — S32.415D: Status: ACTIVE | Noted: 2021-01-01

## 2021-08-26 NOTE — PROGRESS NOTES
(SENOKOT) 8.6 MG tablet Take 1 tablet by mouth daily as needed (Constipation)      levothyroxine (SYNTHROID) 50 MCG tablet Take 1 tablet by mouth Daily 30 tablet 3    pantoprazole (PROTONIX) 40 MG tablet Take 1 tablet by mouth 2 times daily (before meals) 30 tablet 3    Biotin 35470 MCG TABS Take 10,000 mcg by mouth      lidocaine 4 % external patch Place 1 patch onto the skin daily 30 patch 1    furosemide (LASIX) 40 MG tablet Take 1 tablet by mouth daily 60 tablet 3    Multiple Vitamins-Minerals (THERAPEUTIC MULTIVITAMIN-MINERALS) tablet Take 1 tablet by mouth daily      ferrous sulfate 325 (65 Fe) MG tablet Take 325 mg by mouth 2 times daily      magnesium 30 MG tablet Take 250 mg by mouth nightly      carvedilol (COREG) 6.25 MG tablet Take 6.25 mg by mouth nightly      acetaminophen (TYLENOL) 325 MG tablet Take 650 mg by mouth every 4 hours as needed for Pain (pain 1-3)      aspirin 81 MG tablet Take 81 mg by mouth daily      carvedilol (COREG) 6.25 MG tablet Take 12.5 mg by mouth every morning       clopidogrel (PLAVIX) 75 MG tablet Take 75 mg by mouth daily      rosuvastatin (CRESTOR) 20 MG tablet Take 20 mg by mouth every other day Pt. Takes every other day at home.  Calcium Carbonate-Vitamin D (CALCIUM + D) 600-200 MG-UNIT TABS Take by mouth 2 times daily       Cholecalciferol (VITAMIN D) 2000 UNITS CAPS capsule Take 2,000 Units by mouth daily       potassium chloride (KLOR-CON M) 20 MEQ extended release tablet Take 1 tablet by mouth daily for 7 days 7 tablet 0     No current facility-administered medications for this visit.      Allergies: Iodine, Lodine [etodolac], Penicillins, and Sulfa antibiotics  Past Medical History:   Diagnosis Date    Anemia 11/22/2012    CAD (coronary artery disease)     Chronic back pain     CKD (chronic kidney disease)     Compression fracture of lumbar spine, non-traumatic (HCC)     Compression fracture of thoracic vertebra ([facility] [address]     History of blood transfusion     HTN (hypertension) 11/22/2012    Hyperlipidemia     OA (osteoarthritis) 11/22/2012    Osteoporosis 11/22/2012    S/P AVR     TISSUE    Valvular heart disease 11/22/2012     Past Surgical History:   Procedure Laterality Date    APPENDECTOMY      CARDIAC SURGERY      CHOLECYSTECTOMY      CORONARY ARTERY BYPASS GRAFT      ECHO COMPL W DOP COLOR FLOW  11/23/2012         LYMPHADENECTOMY Right     upper rt leg lymph node removed over 20 years ago    PAIN MANAGEMENT PROCEDURE      MORPHINE PAIN PUMP    SKIN BIOPSY      TONSILLECTOMY      UPPER GASTROINTESTINAL ENDOSCOPY N/A 2/8/2021    EGD BIOPSY performed by Melisa Tsai MD at [address] [place] [address]     History reviewed. No pertinent family history. Social History     Tobacco Use    Smoking status: Never Smoker    Smokeless tobacco: Never Used   Substance Use Topics    Alcohol use: No                           Review of Systems   Constitutional: Negative for fever and chills. HENT: Negative for ear pain, sore throat and sinus pressure. Eyes: Negative for pain, discharge and redness. Respiratory: Negative for cough, shortness of breath and wheezing. Cardiovascular: Negative for chest pain. Gastrointestinal: Negative for nausea, vomiting, diarrhea and abdominal distention. Genitourinary: Negative for dysuria and frequency. Musculoskeletal: Positive for back pain and arthralgias. All other systems reviewed and negative. CC: Left hip pain    S: Lovely Zapata is a 79 y.o. who is here today for initial evaluation for her left hip. Patient was recently in the hospital did have some complaints of left hip pain. Septic arthritis was excluded. Patient did have a CT scan and upon review there was evidence of a fracture into the left acetabulum. This was relayed to patient's family she was discharged prior to my evaluation the hospital to a rehab facility where she is still out today. She is accompanied by her granddaughter today. Patient does have history of chronic pain and does have a morphine pump which she has had for years. States she has had this for 15 years now but feels it is not working. States he see pain management but feel that something needs adjustment she still continues to have global pain. Patient did have a fall back on 8/4/2021 which exacerbated her left hip pain. Denies any other interval questions or concerns. No new injuries or trauma. Granddaughter states she has been doing some therapy at her facility although is limited due to patient's global discomfort. Physical Exam  Temp 97.2 °F (36.2 °C)   O:   CONSTITUTIONAL: awake, alert, cooperative, no apparent distress, and appears stated age  EYES: Lids and lashes normal, pupils equal, round and reactive to light, extra ocular muscles intact, sclera clear, conjunctiva normal  ENT: Normocephalic, without obvious abnormality, atraumatic, external ears without lesions, oral pharynx with moist mucus membranes  NECK: Trachea midline, skin normal  LUNGS: No increased work of breathing, good air exchange  CARDIOVASCULAR: 2+ pulses throughout and capillary Refill less than 2 seconds  ABDOMEN: soft, non-distended, non-tender and no rebound tenderness or guarding  NEUROLOGIC: Awake, alert, oriented to name, place and time. Cranial nerves II-XII are grossly intact. Left Lower Extremity Exam:  Skin intact, mild generalized edema bilateral lower extremities, there is genu valgum more pronounced on the left knee relatively fixed  Able to passively logroll the hip today without any significant discomfort or pain  Patient has had very limited active ROM of knee approximately 2070 degrees today before discomfort  Demonstrates active knee flexion/extension, ankle plantar/dorsiflexion/great toe extension. States sensation intact to gross touch to foot/ankle. Palpable dorsalis pedis and posterior tibialis pulses, cap refill brisk in toes, foot warm/perfused.   Compartments supple throughout thigh and leg, calves supple/NT    Xrays/CT Reviewed  Left acetabular fracture involving anterior wall and into the dome, this is nondisplaced, there is no obvious changes on x-rays, osteopenic bone quality. ASSESSMENT:    ICD-10-CM    1. Closed nondisplaced fracture of anterior wall of left acetabulum, initial encounter ([facility] [address]  S32.415A    2. Closed nondisplaced fracture of anterior wall of left acetabulum with routine healing  S32.415D        PLAN:   Lengthy discussion with patient and granddaughter today. We did discuss her fracture and discussed treatment options and they were agreeable with recommendations for nonoperative treatment. Discussed recommend she continues with therapy at her facility try improve her overall strengthening and gait training  Discussed her advanced knee arthritis and deformity, discussed this is beyond the point of corrective bracing. Recommend a follow-up with her pain management doctor regarding their morphine pump. Patient will see us back in office in 6 weeks for repeat evaluation with repeat x-rays. Electronically Signed By  Margie Parham DO  8/24/21    NOTE: This report was transcribed using voice recognition software.  Every effort was made to ensure accuracy; however, inadvertent computerized transcription errors may be present

## 2024-03-15 NOTE — PROGRESS NOTES
Hospitalist Progress Note      SYNOPSIS: Patient admitted on 2021 presented to [facility] with past medical history of anemia, hypertension, osteoarthritis, coronary artery disease status post CABG, spinal stenosis, chronic back pain in pain management, she has spinal stimulator in the left of the abdomen, valvular heart disease-aortic stenosis status post bioprosthetic aortic valve replacement, lymphedema, obesity, ambulatory dysfunction, uses assistive device at home and CKD. Patient has been having pain in the left hip and lower back for about 6 days, pain is dull, constant, moderate in intensity, worse with movement and associated with worsening ambulatory dysfunction.        Vital signs notable for blood pressure 119/90, labs showed negative SARS-CoV-2, glucose 111, white count 14.8, hemoglobin 9.7, this was recently 10.5, potassium 3.4, creatinine 1.5, usual baseline 1.0, proBNP 3919, INR 1.3 and negative troponin. Urinalysis with greater than 20 white cells, 5-10 red cells with many bacteria and leukocyte esterase. Chest x-ray shows stable pleural thickening, x-ray of the left hip and pelvis showed healed pelvic rami fracture on the right, CT scan of the abdomen and pelvis shows thickening of the gastric antrum and proximal duodenum with surrounding fluid, ? Infectious/inflammatory process versus peptic ulcer disease, peripancreatic edema, trace pleural effusion and small ascites. SUBJECTIVE:  :  Patient is somnolent. She does respond and follows commands. Staff indicates that she is refusing MRI  She does not complain of pain at this time although she is very slow and somnolent with regard to expressing herself.       Temp (24hrs), Av.6 °F (36.4 °C), Min:97.2 °F (36.2 °C), Max:98.6 °F (37 °C)    DIET: DIET FULL LIQUID;  CODE: Full Code    Intake/Output Summary (Last 24 hours) at 2021 1006  Last data filed at 2021 0614  Gross per 24 hour   Intake 120 ml   Output 2700 ml Still no paperwork.     Left message for patient to call the office back to let her know the message from Sarah and that we still have not received the paperwork.    Net -2580 ml       Review of Systems  All bolded are positive; please see HPI  General:  Fever, chills, diaphoresis, fatigue, malaise, night sweats, weight loss  Psychological:  Anxiety, disorientation, hallucinations. ENT:  Epistaxis, headaches, vertigo, visual changes. Cardiovascular:  Chest pain, irregular heartbeats, palpitations, paroxysmal nocturnal dyspnea. Respiratory:  Shortness of breath, coughing, sputum production, hemoptysis, wheezing, orthopnea. Gastrointestinal:  Nausea, vomiting, diarrhea, heartburn, constipation, abdominal pain, hematemesis, hematochezia, melena, acholic stools  Genito-Urinary:  Dysuria, urgency, frequency, hematuria  Musculoskeletal:  Joint pain, joint stiffness, joint swelling, muscle pain  Neurology:  Headache, focal neurological deficits, weakness, numbness, paresthesia  Derm:  Rashes, ulcers, excoriations, bruising  Extremities:  Decreased ROM, peripheral edema, mottling      OBJECTIVE:    BP (!) 141/67   Pulse 82   Temp 98.6 °F (37 °C) (Temporal)   Resp 16   Ht 5' 4\" (1.626 m)   Wt 188 lb 1.6 oz (85.3 kg)   SpO2 95%   BMI 32.29 kg/m²     General appearance:  awake, somnolent and oriented to person, place, not time,  appears stated age and cooperative; no apparent distress no labored breathing  HEENT:  Conjunctivae/corneas clear. Neck: Supple. No jugular venous distention. Respiratory: symmetrical; clear to auscultation bilaterally; no wheezes; no rhonchi; no rales  Cardiovascular: rhythm regular; rate controlled; 2/6 systolic murmur at the base reviewed  Abdomen: Soft, nontender, nondistended  Extremities:  peripheral pulses present; no peripheral edema; no ulcers-there is significant swelling to the left arm which is the IV site  Musculoskeletal: No clubbing, cyanosis, no bilateral lower extremity edema. Brisk capillary refill.    Skin:  No rashes  on visible skin  Neurologic: awake, alert and following commands     ASSESSMENT and PLAN: · Gastroduodenitis-stool for H. pylori. PPI. General surgery following. · UTI- IV Rocephin. Urine culture. Blood culture. Procalcitonin 0.71. · MEGHAN on CKD- patient has signs of volume overload and needs to be diuresed. Gentle diuresis, monitor I's and O's and daily weights. Hold fluids for now. Nephrology consult if kidney function gets worse. Kidneys look normal on CT scan. · Acute on chronic diastolic CHF- patient with volume overload, gentle diuresis with Bumex. Last echo was in 2014, EF 70 to 75% with grade 1 diastolic dysfunction. Repeat echo. · Acute on chronic back pain-  L2 fracture on CT scan, obtain MRI of the lumbar spine to evaluate. Pain control. · Left hip pain- no acute findings on x-ray. Clinical presentation worrisome for septic hip. Ortho evaluation. · Hypokalemia- replace and monitor  · Bilateral lower extremity edema with pain- Doppler ultrasound to rule out DVT. ? Cellulitis. Rocephin should cover.   Free T4 decreased, start synthroid  · Coronary artery disease-   · Hyperlipidemia- statin  · Debility with ambulatory dysfunction- physical therapy as tolerated when appropriate  · Anemia- monitor hemoglobin  · Hypertension-           DISPOSITION: Continue current plan of care-as reviewed   Potassium was 3.4   white cell count coming down  Surgical consultation reviewed  Significant swelling of the left arm ultrasound for DVT          Medications:  REVIEWED DAILY    Infusion Medications   Scheduled Medications    heparin (porcine)  5,000 Units Subcutaneous Q8H    levothyroxine  50 mcg Oral Daily    pantoprazole  40 mg Intravenous BID    carvedilol  12.5 mg Oral QAM    carvedilol  6.25 mg Oral Nightly    vitamin D  2,000 Units Oral Daily    ferrous sulfate  325 mg Oral BID    lidocaine  1 patch Transdermal Daily    therapeutic multivitamin-minerals  1 tablet Oral Daily    rosuvastatin  20 mg Oral Daily    sodium chloride flush  10 mL Intravenous 2 times per day  cefTRIAXone (ROCEPHIN) IV  1,000 mg Intravenous Q24H    bumetanide  1 mg Intravenous BID     PRN Meds: perflutren lipid microspheres, acetaminophen, sodium chloride flush, promethazine **OR** ondansetron, polyethylene glycol, HYDROcodone 5 mg - acetaminophen, [Held by provider] morphine    Labs:     Recent Labs     02/05/21  1644 02/06/21 0620 02/07/21  0649   WBC 14.8* 13.3* 12.4*   HGB 9.7* 9.1* 9.0*   HCT 31.8* 28.1* 28.1*    145 166       Recent Labs     02/05/21  1644 02/06/21  0620 02/07/21  0649    139 137   K 3.4* 3.6 3.4*    107 103   CO2 23 22 23   BUN 38* 36* 30*   CREATININE 1.5* 1.5* 1.4*   CALCIUM 8.3* 8.3* 8.2*       Recent Labs     02/05/21  1644 02/05/21  2315 02/06/21 0620 02/07/21  0649   PROT 5.2*  --   --  4.3*   ALKPHOS 147*  --   --  112*   ALT 13  --   --  9   AST 18  --   --  14   BILITOT 0.5  --   --  <0.2   LIPASE  --  15 14  --        Recent Labs     02/05/21 1644   INR 1.3       Recent Labs     02/05/21  1644 02/06/21  0620   CKTOTAL  --  10*   TROPONINI <0.01  --        Chronic labs:    Lab Results   Component Value Date    TSH 2.620 02/05/2021    INR 1.3 02/05/2021       Radiology: REVIEWED DAILY    +++++++++++++++++++++++++++++++++++++++++++++++++  Oly SO/ Zia [address], [place]  +++++++++++++++++++++++++++++++++++++++++++++++++  NOTE: This report was transcribed using voice recognition software. Every effort was made to ensure accuracy; however, inadvertent computerized transcription errors may be present.

## 2024-04-02 NOTE — PLAN OF CARE
Problem: Pain:  Goal: Pain level will decrease  Description: Pain level will decrease  8/6/5045 [id number] by Katlyn Lux RN  Outcome: Ongoing  8/5/2021 2004 by Ezekiel Lopes RN  Outcome: Ongoing  Goal: Control of acute pain  Description: Control of acute pain  Outcome: Ongoing  Goal: Control of chronic pain  Description: Control of chronic pain  2/4/2503 6027 by Katlyn Lux RN  Outcome: Ongoing  8/5/2021 2004 by Ezekiel Lopes RN  Outcome: Ongoing     Problem: Falls - Risk of:  Goal: Will remain free from falls  Description: Will remain free from falls  3/5/5189 1720 by Katlyn Lux RN  Outcome: Met This Shift  8/5/2021 2004 by Ezekiel Lopes RN  Outcome: Met This Shift  Goal: Absence of physical injury  Description: Absence of physical injury  4/3/1741 8622 by Katlyn Lux RN  Outcome: Met This Shift  8/5/2021 2004 by Ezekiel Lopes RN  Outcome: Met This Shift     Problem: Skin Integrity:  Goal: Will show no infection signs and symptoms  Description: Will show no infection signs and symptoms  8/5/2021 2004 by Ezekiel Lopes RN  Outcome: Met This Shift  Goal: Absence of new skin breakdown  Description: Absence of new skin breakdown  8/5/2021 2004 by Ezekiel Lopes RN  Outcome: Met This Shift Thank you for allowing us to participate in your care today. Please follow up in 12 weeks after your obtain the low dose CT scan. If you are continuing to have chest pain please call 911 and go to the ED. If you continue to have chest pain with exertion you might need a coronary angiogram.

## (undated) DEVICE — FORCEPS BX L160CM JAW DIA2.4MM YEL L CAP W/ NDL DISP RAD

## (undated) DEVICE — GAUZE,SPONGE,POST-OP,4X3,STRL,LF: Brand: MEDLINE

## (undated) DEVICE — BITEBLOCK 54FR W/ DENT RIM BLOX

## (undated) DEVICE — CANNULA NSL ORAL AD FOR CAPNOFLEX CO2 O2 AIRLFE

## (undated) DEVICE — CONNECTOR IRRIGATION AUXILIARY H2O JET W/ PRT MTL THRD HYDR

## (undated) DEVICE — DEFENDO AIR WATER SUCTION AND BIOPSY VALVE KIT FOR  OLYMPUS: Brand: DEFENDO AIR/WATER/SUCTION AND BIOPSY VALVE

## (undated) DEVICE — CONTAINER SPEC 60ML PH 7NEUTRAL BUFF FRMLN RDY TO USE